# Patient Record
Sex: MALE | Race: BLACK OR AFRICAN AMERICAN | Employment: OTHER | URBAN - METROPOLITAN AREA
[De-identification: names, ages, dates, MRNs, and addresses within clinical notes are randomized per-mention and may not be internally consistent; named-entity substitution may affect disease eponyms.]

---

## 2018-08-14 ENCOUNTER — HOSPITAL ENCOUNTER (OUTPATIENT)
Dept: NUCLEAR MEDICINE | Age: 73
Discharge: HOME | End: 2018-08-14
Attending: FAMILY MEDICINE | Admitting: FAMILY MEDICINE

## 2018-08-14 DIAGNOSIS — E21.3 HYPERPARATHYROIDISM (HCC): ICD-10-CM

## 2018-08-14 RX ADMIN — Medication 20 MILLICURIE: at 11:45

## 2018-08-23 ENCOUNTER — OFFICE VISIT (OUTPATIENT)
Dept: SURGERY | Age: 73
End: 2018-08-23

## 2018-08-23 VITALS
DIASTOLIC BLOOD PRESSURE: 70 MMHG | HEART RATE: 80 BPM | WEIGHT: 200 LBS | HEIGHT: 73 IN | SYSTOLIC BLOOD PRESSURE: 106 MMHG | BODY MASS INDEX: 26.51 KG/M2

## 2018-08-23 DIAGNOSIS — D35.1 PARATHYROID ADENOMA: Primary | ICD-10-CM

## 2018-08-23 PROCEDURE — 1036F TOBACCO NON-USER: CPT | Performed by: SURGERY

## 2018-08-23 PROCEDURE — 4040F PNEUMOC VAC/ADMIN/RCVD: CPT | Performed by: SURGERY

## 2018-08-23 PROCEDURE — G8419 CALC BMI OUT NRM PARAM NOF/U: HCPCS | Performed by: SURGERY

## 2018-08-23 PROCEDURE — 1123F ACP DISCUSS/DSCN MKR DOCD: CPT | Performed by: SURGERY

## 2018-08-23 PROCEDURE — G8427 DOCREV CUR MEDS BY ELIG CLIN: HCPCS | Performed by: SURGERY

## 2018-08-23 PROCEDURE — 99204 OFFICE O/P NEW MOD 45 MIN: CPT | Performed by: SURGERY

## 2018-08-23 PROCEDURE — 3017F COLORECTAL CA SCREEN DOC REV: CPT | Performed by: SURGERY

## 2018-08-23 PROCEDURE — 1101F PT FALLS ASSESS-DOCD LE1/YR: CPT | Performed by: SURGERY

## 2018-08-25 ASSESSMENT — ENCOUNTER SYMPTOMS
BACK PAIN: 0
STRIDOR: 0
PHOTOPHOBIA: 0
SORE THROAT: 0
CHOKING: 0
CONSTIPATION: 0
COLOR CHANGE: 0
ANAL BLEEDING: 0
APNEA: 0
EYE REDNESS: 0
RECTAL PAIN: 0
EYE ITCHING: 0

## 2018-08-25 NOTE — PROGRESS NOTES
HENT:   Head: Normocephalic. Eyes: Pupils are equal, round, and reactive to light. Neck: Normal range of motion. Neck supple. Cardiovascular: Normal rate. Pulmonary/Chest: Effort normal.   Abdominal: Soft. He exhibits no distension and no mass. There is no tenderness. There is no rebound and no guarding. Musculoskeletal: Normal range of motion. Neurological: He is alert and oriented to person, place, and time. Skin: Skin is warm. Psychiatric: He has a normal mood and affect. ASSESSMENT:  1. Parathyroid adenoma          PLAN:  Treatment:  Patient needs a right parathyroidectomy corresponding to the parathyroid scan. This is likely to be parathyroid adenoma. .  Patient counseled on risks, benefits, and alternatives of treatment plan at length today. Patient states an understanding and willingness to proceed with plan. No orders of the defined types were placed in this encounter. No orders of the defined types were placed in this encounter. Follow Up:  No Follow-up on file.       Karen Lozano MD

## 2018-08-27 ENCOUNTER — TELEPHONE (OUTPATIENT)
Dept: SURGERY | Age: 73
End: 2018-08-27

## 2018-08-27 NOTE — ANESTHESIA PRE-OP
Pre-Admission Testing   Pre-Procedural Screening   NP Note    PMH:  Past Medical History:   Diagnosis Date    CAD (coronary artery disease)     Cancer ([facility] [address]     prostate    H/O blood clots 2013    Hyperlipidemia     Hypertension     Prostate cancer ([facility]     psa 4. PSH:    Past Surgical History:   Procedure Laterality Date    PROSTATE BIOPSY      VASECTOMY          Current Medications:   Not in a hospital admission. Allergies: Allergies   Allergen Reactions    Sulfa Antibiotics Other (See Comments)     Coats his tongue             Social History:  Social History     Social History    Marital status:      Spouse name: N/A    Number of children: N/A    Years of education: N/A     Occupational History    Not on file. Social History Main Topics    Smoking status: Never Smoker    Smokeless tobacco: Never Used    Alcohol use 0.6 oz/week     1 Glasses of wine per week    Drug use: No    Sexual activity: No     Other Topics Concern    Not on file     Social History Narrative    No narrative on file        Recent Vitals:  Vitals:    08/29/18 0844   BP: 127/70   Pulse: 56   Resp: 16   Temp: 97.9 °F (36.6 °C)   SpO2: 98%     Wt Readings from Last 3 Encounters:   08/23/18 200 lb (90.7 kg)   10/15/17 209 lb 1.6 oz (94.8 kg)   01/25/17 212 lb (96.2 kg)      Ht Readings from Last 3 Encounters:   08/23/18 6' 1\" (1.854 m)   10/14/17 6' 1\" (1.854 m)   01/25/17 6' 1\" (1.854 m)     There is no height or weight on file to calculate BMI. Wt Readings from Last 3 Encounters:   08/23/18 200 lb (90.7 kg)   10/15/17 209 lb 1.6 oz (94.8 kg)   01/25/17 212 lb (96.2 kg)       Present on Admission:  **None**       Anesthesia Alerts:  NO      Malignant Hyperthermia:  NO     History of Sleep Apnea:   No    REVIEW OF SYSTEMS:      EYES: wears glasses    HEENT: neg     RESPIRATORY:   Non smoker  Able to lie flat. CARDIOVASCULAR: HTN, HLD, CAD, DVT in left arm, 2013. Treated  ~6 month on coumadin. 09:00 AM    CO2 27 08/29/2018 09:00 AM    BUN 19 08/29/2018 09:00 AM    CREATININE 1.0 08/29/2018 09:00 AM    GLUCOSE 104 (H) 08/29/2018 09:00 AM       Summary:  Chart reviewed, pt. Interviewed and examined. Planned surgical procedure:  Right Parathyroidectomy per Dr. Mere Aaron    1. HTN, HLD, CAD, DVT in left arm, 2013. Treated ~6 month on coumadin. NL stress, 2017. Denies CP or SOB. Exercises: elliptical 4-5 days per week. 2.  Non smoker. Able to lie flat. 3.  Reflux , s/p hemorrhoidectomy. 4. Hx BPH, prostate CA, 2013. Hx post-biopsy sepsis, 2014. GS 3+3 prostate cancer and a small renal lesion both on surveillance. Last bx 2017. Followed by Dr. Can Schneider @ [facility]. S/p vasectomy epididymectomy, 1989. On flomax. Anesthesia Evaluation will follow by a certified practitioner prior to surgery.     Electronically signed by DAYNA Leal CNP on 8/27/2018 at 1:00 PM

## 2018-08-29 ENCOUNTER — HOSPITAL ENCOUNTER (OUTPATIENT)
Dept: PREADMISSION TESTING | Age: 73
Discharge: HOME | End: 2018-08-29
Attending: SURGERY | Admitting: SURGERY

## 2018-08-29 VITALS
HEIGHT: 72 IN | WEIGHT: 201.25 LBS | HEART RATE: 56 BPM | OXYGEN SATURATION: 98 % | TEMPERATURE: 97.9 F | DIASTOLIC BLOOD PRESSURE: 70 MMHG | BODY MASS INDEX: 27.26 KG/M2 | SYSTOLIC BLOOD PRESSURE: 127 MMHG | RESPIRATION RATE: 16 BRPM

## 2018-08-29 LAB
ANION GAP SERPL CALCULATED.3IONS-SCNC: 8 MMOL/L (ref 4–16)
BUN BLDV-MCNC: 19 MG/DL (ref 6–23)
CALCIUM SERPL-MCNC: 10.9 MG/DL (ref 8.3–10.6)
CHLORIDE BLD-SCNC: 104 MMOL/L (ref 99–110)
CO2: 27 MMOL/L (ref 21–32)
CREAT SERPL-MCNC: 1 MG/DL (ref 0.9–1.3)
GFR AFRICAN AMERICAN: >60 ML/MIN/1.73M2
GFR NON-AFRICAN AMERICAN: >60 ML/MIN/1.73M2
GLUCOSE BLD-MCNC: 104 MG/DL (ref 70–99)
HCT VFR BLD CALC: 44.3 % (ref 42–52)
HEMOGLOBIN: 13.7 GM/DL (ref 13.5–18)
MCH RBC QN AUTO: 29.8 PG (ref 27–31)
MCHC RBC AUTO-ENTMCNC: 30.9 % (ref 32–36)
MCV RBC AUTO: 96.3 FL (ref 78–100)
PDW BLD-RTO: 12.3 % (ref 11.7–14.9)
PLATELET # BLD: 178 K/CU MM (ref 140–440)
PMV BLD AUTO: 10.6 FL (ref 7.5–11.1)
POTASSIUM SERPL-SCNC: 4.3 MMOL/L (ref 3.5–5.1)
RBC # BLD: 4.6 M/CU MM (ref 4.6–6.2)
SODIUM BLD-SCNC: 139 MMOL/L (ref 135–145)
WBC # BLD: 4 K/CU MM (ref 4–10.5)

## 2018-08-29 RX ORDER — MINERAL OIL
OIL (ML) MISCELLANEOUS DAILY
COMMUNITY
End: 2022-08-17

## 2018-08-31 LAB
EKG ATRIAL RATE: 50 BPM
EKG DIAGNOSIS: NORMAL
EKG P AXIS: 48 DEGREES
EKG P-R INTERVAL: 216 MS
EKG Q-T INTERVAL: 432 MS
EKG QRS DURATION: 88 MS
EKG QTC CALCULATION (BAZETT): 393 MS
EKG R AXIS: -18 DEGREES
EKG T AXIS: -2 DEGREES
EKG VENTRICULAR RATE: 50 BPM

## 2018-09-20 ENCOUNTER — OFFICE VISIT (OUTPATIENT)
Dept: SURGERY | Age: 73
End: 2018-09-20

## 2018-09-20 ENCOUNTER — HOSPITAL ENCOUNTER (OUTPATIENT)
Dept: LAB | Age: 73
Discharge: HOME | End: 2018-09-20
Attending: UROLOGY | Admitting: UROLOGY

## 2018-09-20 VITALS
HEIGHT: 73 IN | WEIGHT: 200 LBS | DIASTOLIC BLOOD PRESSURE: 62 MMHG | HEART RATE: 75 BPM | SYSTOLIC BLOOD PRESSURE: 90 MMHG | BODY MASS INDEX: 26.51 KG/M2

## 2018-09-20 DIAGNOSIS — E78.01 FAMILIAL HYPERCHOLESTEROLEMIA: Primary | ICD-10-CM

## 2018-09-20 DIAGNOSIS — D35.1 PARATHYROID ADENOMA: ICD-10-CM

## 2018-09-20 LAB — PROSTATE SPECIFIC ANTIGEN: 5.46 NG/ML (ref 0–4)

## 2018-09-20 PROCEDURE — 99024 POSTOP FOLLOW-UP VISIT: CPT | Performed by: SURGERY

## 2018-10-22 NOTE — PROGRESS NOTES
Chief Complaint   Patient presents with    Post-Op Check     1st P/O Right Parathyroidectomy, 9/4/18         SUBJECTIVE:  Patient here for post op visit. Pain is minimal.  Wounds: minbruising and no discharge. Past Surgical History:   Procedure Laterality Date    CARDIAC CATHETERIZATION  2015    COLONOSCOPY  Last Done In 2000's    Polyps Removed In Past    DENTAL SURGERY      Teeth Extracted In Past   [address]    PARATHYROIDECTOMY Right 09/04/2018    PROSTATE BIOPSY  2016, 2017    \"Prostate Cancer Dx In 2016\"   [address]     Past Medical History:   Diagnosis Date    Acid reflux     Diverticulitis Last Episode In 2013    Hx of blood clots Dx 2013    \"Left Arm\"    Hyperlipidemia     Hypertension     Parathyroid adenoma     Prostate Cancer Dx 2016    2 Prostate Biopsies Done, Last Done In 2017    Teeth missing     Upper And Lower    Wears glasses     Wears partial dentures     Upper     Family History   Problem Relation Age of Onset    Stroke Mother     Mental Illness Mother     Obesity Mother      Social History     Social History    Marital status:      Spouse name: N/A    Number of children: N/A    Years of education: N/A     Occupational History    Not on file. Social History Main Topics    Smoking status: Never Smoker    Smokeless tobacco: Never Used    Alcohol use Yes      Comment: \"A Couple Times A Week\"    Drug use: No    Sexual activity: Yes     Partners: Female     Other Topics Concern    Not on file     Social History Narrative    No narrative on file       OBJECTIVE:  Physical Exam    Wound well healed without signs of active infection. Suture line intact. Abdomen soft, nontender, nondistended. Path reveals:   Final Pathologic Diagnosis:  Parathyroid, right, resection:  -  Hypercellular parathyroid gland is identified compatible  with clinical impression of parathyroid  adenoma. -  Negative for malignancy.     ASSESSMENT:  Patient doing well on this post operative check. Wounds well healed. 1. Familial hypercholesterolemia    2. Parathyroid adenoma        PLAN:  Continue same  Increase activity as tolerated        No orders of the defined types were placed in this encounter. No orders of the defined types were placed in this encounter. Follow Up: No Follow-up on file.     Ishaan Gonzalez MD

## 2018-10-25 ENCOUNTER — TELEPHONE (OUTPATIENT)
Dept: SURGERY | Age: 73
End: 2018-10-25

## 2018-11-29 ENCOUNTER — OFFICE VISIT (OUTPATIENT)
Dept: SURGERY | Age: 73
End: 2018-11-29

## 2018-11-29 VITALS
HEART RATE: 60 BPM | BODY MASS INDEX: 26.74 KG/M2 | HEIGHT: 73 IN | DIASTOLIC BLOOD PRESSURE: 68 MMHG | SYSTOLIC BLOOD PRESSURE: 128 MMHG | WEIGHT: 201.8 LBS

## 2018-11-29 DIAGNOSIS — E83.52 HYPERCALCEMIA: Primary | ICD-10-CM

## 2018-11-29 PROCEDURE — 99024 POSTOP FOLLOW-UP VISIT: CPT | Performed by: SURGERY

## 2018-12-04 ENCOUNTER — TELEPHONE (OUTPATIENT)
Dept: SURGERY | Age: 73
End: 2018-12-04

## 2018-12-05 ENCOUNTER — HOSPITAL ENCOUNTER (OUTPATIENT)
Age: 73
Discharge: HOME | End: 2018-12-05
Payer: MEDICARE

## 2018-12-05 DIAGNOSIS — E83.52 HYPERCALCEMIA: ICD-10-CM

## 2018-12-05 LAB
CALCIUM IONIZED: 4.68 MG/DL (ref 4.48–5.28)
IONIZED CA: 1.17 MMOL/L (ref 1.12–1.32)

## 2018-12-05 PROCEDURE — 36415 COLL VENOUS BLD VENIPUNCTURE: CPT

## 2018-12-05 PROCEDURE — 82330 ASSAY OF CALCIUM: CPT

## 2018-12-28 ENCOUNTER — HOSPITAL ENCOUNTER (OUTPATIENT)
Age: 73
Discharge: HOME | End: 2018-12-28
Payer: MEDICARE

## 2018-12-28 LAB
ALBUMIN SERPL-MCNC: 4.2 GM/DL (ref 3.4–5)
ALP BLD-CCNC: 55 IU/L (ref 40–129)
ALT SERPL-CCNC: 30 U/L (ref 10–40)
ANION GAP SERPL CALCULATED.3IONS-SCNC: 10 MMOL/L (ref 4–16)
AST SERPL-CCNC: 21 IU/L (ref 15–37)
BILIRUB SERPL-MCNC: 0.8 MG/DL (ref 0–1)
BUN BLDV-MCNC: 19 MG/DL (ref 6–23)
CALCIUM SERPL-MCNC: 9.5 MG/DL (ref 8.3–10.6)
CHLORIDE BLD-SCNC: 101 MMOL/L (ref 99–110)
CO2: 27 MMOL/L (ref 21–32)
CREAT SERPL-MCNC: 1 MG/DL (ref 0.9–1.3)
ERYTHROCYTE SEDIMENTATION RATE: 1 MM/HR (ref 0–20)
GFR AFRICAN AMERICAN: >60 ML/MIN/1.73M2
GFR NON-AFRICAN AMERICAN: >60 ML/MIN/1.73M2
GLUCOSE BLD-MCNC: 85 MG/DL (ref 70–99)
HCT VFR BLD CALC: 46.6 % (ref 42–52)
HEMOGLOBIN: 14.7 GM/DL (ref 13.5–18)
MCH RBC QN AUTO: 30.4 PG (ref 27–31)
MCHC RBC AUTO-ENTMCNC: 31.5 % (ref 32–36)
MCV RBC AUTO: 96.3 FL (ref 78–100)
PDW BLD-RTO: 12.7 % (ref 11.7–14.9)
PLATELET # BLD: 177 K/CU MM (ref 140–440)
PMV BLD AUTO: 11.1 FL (ref 7.5–11.1)
POTASSIUM SERPL-SCNC: 4.4 MMOL/L (ref 3.5–5.1)
RBC # BLD: 4.84 M/CU MM (ref 4.6–6.2)
SODIUM BLD-SCNC: 138 MMOL/L (ref 135–145)
TOTAL PROTEIN: 6.7 GM/DL (ref 6.4–8.2)
WBC # BLD: 4.1 K/CU MM (ref 4–10.5)

## 2018-12-28 PROCEDURE — 84154 ASSAY OF PSA FREE: CPT

## 2018-12-28 PROCEDURE — 85027 COMPLETE CBC AUTOMATED: CPT

## 2018-12-28 PROCEDURE — 86320 SERUM IMMUNOELECTROPHORESIS: CPT

## 2018-12-28 PROCEDURE — 85652 RBC SED RATE AUTOMATED: CPT

## 2018-12-28 PROCEDURE — 80053 COMPREHEN METABOLIC PANEL: CPT

## 2018-12-28 PROCEDURE — 36415 COLL VENOUS BLD VENIPUNCTURE: CPT

## 2018-12-28 PROCEDURE — 83516 IMMUNOASSAY NONANTIBODY: CPT

## 2018-12-28 PROCEDURE — 84153 ASSAY OF PSA TOTAL: CPT

## 2018-12-30 LAB
PROSTATE SPECIFIC ANTIGEN FREE: 1
PROSTATE SPECIFIC ANTIGEN PERCENT FREE: 17
PROSTATE SPECIFIC ANTIGEN: 6

## 2018-12-31 ENCOUNTER — HOSPITAL ENCOUNTER (OUTPATIENT)
Dept: CT IMAGING | Age: 73
Discharge: HOME | End: 2018-12-31
Payer: MEDICARE

## 2018-12-31 DIAGNOSIS — R10.9 STOMACH ACHE: ICD-10-CM

## 2018-12-31 LAB
GLIADIN ANTIBODIES IGA: 4
GLIADIN ANTIBODIES IGG: 5
TRANSGLUTAMINASE IGA: 0

## 2018-12-31 PROCEDURE — 74178 CT ABD&PLV WO CNTR FLWD CNTR: CPT

## 2018-12-31 PROCEDURE — 6360000004 HC RX CONTRAST MEDICATION: Performed by: FAMILY MEDICINE

## 2018-12-31 RX ADMIN — IOHEXOL 50 ML: 240 INJECTION, SOLUTION INTRATHECAL; INTRAVASCULAR; INTRAVENOUS; ORAL at 09:06

## 2018-12-31 RX ADMIN — IOPAMIDOL 75 ML: 755 INJECTION, SOLUTION INTRAVENOUS at 09:07

## 2019-01-28 ENCOUNTER — HOSPITAL ENCOUNTER (OUTPATIENT)
Age: 74
Discharge: HOME | End: 2019-01-28
Payer: MEDICARE

## 2019-01-28 LAB — PROSTATE SPECIFIC ANTIGEN: 6.97 NG/ML (ref 0–4)

## 2019-01-28 PROCEDURE — G0103 PSA SCREENING: HCPCS

## 2019-01-28 PROCEDURE — 36415 COLL VENOUS BLD VENIPUNCTURE: CPT

## 2019-01-28 PROCEDURE — 84153 ASSAY OF PSA TOTAL: CPT

## 2019-03-04 ENCOUNTER — APPOINTMENT (OUTPATIENT)
Dept: GENERAL RADIOLOGY | Age: 74
DRG: 247 | End: 2019-03-04
Payer: MEDICARE

## 2019-03-04 ENCOUNTER — HOSPITAL ENCOUNTER (INPATIENT)
Age: 74
LOS: 1 days | Discharge: HOME | DRG: 247 | End: 2019-03-06
Attending: EMERGENCY MEDICINE | Admitting: INTERNAL MEDICINE
Payer: MEDICARE

## 2019-03-04 DIAGNOSIS — R07.9 CHEST PAIN, UNSPECIFIED TYPE: Primary | ICD-10-CM

## 2019-03-04 PROBLEM — I20.9 ANGINA PECTORIS (HCC): Status: ACTIVE | Noted: 2019-03-04

## 2019-03-04 LAB
ALBUMIN SERPL-MCNC: 4.3 GM/DL (ref 3.4–5)
ALP BLD-CCNC: 54 IU/L (ref 40–129)
ALT SERPL-CCNC: 26 U/L (ref 10–40)
ANION GAP SERPL CALCULATED.3IONS-SCNC: 10 MMOL/L (ref 4–16)
AST SERPL-CCNC: 26 IU/L (ref 15–37)
BASOPHILS ABSOLUTE: 0 K/CU MM
BASOPHILS RELATIVE PERCENT: 0.6 % (ref 0–1)
BILIRUB SERPL-MCNC: 0.9 MG/DL (ref 0–1)
BUN BLDV-MCNC: 15 MG/DL (ref 6–23)
CALCIUM SERPL-MCNC: 9.1 MG/DL (ref 8.3–10.6)
CHLORIDE BLD-SCNC: 100 MMOL/L (ref 99–110)
CO2: 26 MMOL/L (ref 21–32)
CREAT SERPL-MCNC: 1 MG/DL (ref 0.9–1.3)
DIFFERENTIAL TYPE: ABNORMAL
EOSINOPHILS ABSOLUTE: 0.1 K/CU MM
EOSINOPHILS RELATIVE PERCENT: 2.3 % (ref 0–3)
GFR AFRICAN AMERICAN: >60 ML/MIN/1.73M2
GFR NON-AFRICAN AMERICAN: >60 ML/MIN/1.73M2
GLUCOSE BLD-MCNC: 89 MG/DL (ref 70–99)
HCT VFR BLD CALC: 47.4 % (ref 42–52)
HEMOGLOBIN: 14.4 GM/DL (ref 13.5–18)
IMMATURE NEUTROPHIL %: 0 % (ref 0–0.43)
LYMPHOCYTES ABSOLUTE: 2.2 K/CU MM
LYMPHOCYTES RELATIVE PERCENT: 41.1 % (ref 24–44)
MCH RBC QN AUTO: 30.2 PG (ref 27–31)
MCHC RBC AUTO-ENTMCNC: 30.4 % (ref 32–36)
MCV RBC AUTO: 99.4 FL (ref 78–100)
MONOCYTES ABSOLUTE: 0.5 K/CU MM
MONOCYTES RELATIVE PERCENT: 10.1 % (ref 0–4)
NUCLEATED RBC %: 0 %
PDW BLD-RTO: 12.4 % (ref 11.7–14.9)
PLATELET # BLD: 188 K/CU MM (ref 140–440)
PMV BLD AUTO: 10.9 FL (ref 7.5–11.1)
POTASSIUM SERPL-SCNC: 4.2 MMOL/L (ref 3.5–5.1)
RBC # BLD: 4.77 M/CU MM (ref 4.6–6.2)
SEGMENTED NEUTROPHILS ABSOLUTE COUNT: 2.4 K/CU MM
SEGMENTED NEUTROPHILS RELATIVE PERCENT: 45.9 % (ref 36–66)
SODIUM BLD-SCNC: 136 MMOL/L (ref 135–145)
TOTAL IMMATURE NEUTOROPHIL: 0 K/CU MM
TOTAL NUCLEATED RBC: 0 K/CU MM
TOTAL PROTEIN: 7.2 GM/DL (ref 6.4–8.2)
TROPONIN T: <0.01 NG/ML
WBC # BLD: 5.2 K/CU MM (ref 4–10.5)

## 2019-03-04 PROCEDURE — G0378 HOSPITAL OBSERVATION PER HR: HCPCS

## 2019-03-04 PROCEDURE — 84484 ASSAY OF TROPONIN QUANT: CPT

## 2019-03-04 PROCEDURE — 36415 COLL VENOUS BLD VENIPUNCTURE: CPT

## 2019-03-04 PROCEDURE — 93005 ELECTROCARDIOGRAM TRACING: CPT | Performed by: EMERGENCY MEDICINE

## 2019-03-04 PROCEDURE — 99285 EMERGENCY DEPT VISIT HI MDM: CPT

## 2019-03-04 PROCEDURE — 71046 X-RAY EXAM CHEST 2 VIEWS: CPT

## 2019-03-04 PROCEDURE — 80053 COMPREHEN METABOLIC PANEL: CPT

## 2019-03-04 PROCEDURE — 85025 COMPLETE CBC W/AUTO DIFF WBC: CPT

## 2019-03-04 PROCEDURE — 6370000000 HC RX 637 (ALT 250 FOR IP): Performed by: EMERGENCY MEDICINE

## 2019-03-04 RX ORDER — ATORVASTATIN CALCIUM 40 MG/1
80 TABLET, FILM COATED ORAL NIGHTLY
Status: DISCONTINUED | OUTPATIENT
Start: 2019-03-04 | End: 2019-03-05

## 2019-03-04 RX ORDER — CARVEDILOL 6.25 MG/1
6.25 TABLET ORAL 2 TIMES DAILY WITH MEALS
Status: DISCONTINUED | OUTPATIENT
Start: 2019-03-04 | End: 2019-03-05

## 2019-03-04 RX ORDER — ASPIRIN 81 MG/1
324 TABLET, CHEWABLE ORAL ONCE
Status: COMPLETED | OUTPATIENT
Start: 2019-03-04 | End: 2019-03-04

## 2019-03-04 RX ADMIN — ASPIRIN 81 MG 324 MG: 81 TABLET ORAL at 21:26

## 2019-03-04 ASSESSMENT — PAIN DESCRIPTION - ORIENTATION: ORIENTATION: LEFT

## 2019-03-04 ASSESSMENT — PAIN DESCRIPTION - LOCATION: LOCATION: CHEST

## 2019-03-04 ASSESSMENT — HEART SCORE: ECG: 0

## 2019-03-04 ASSESSMENT — PAIN SCALES - GENERAL: PAINLEVEL_OUTOF10: 2

## 2019-03-05 PROBLEM — I25.10 CAD IN NATIVE ARTERY: Status: ACTIVE | Noted: 2019-03-05

## 2019-03-05 LAB
ACTIVATED CLOTTING TIME, LOW RANGE: 346 SEC
EKG ATRIAL RATE: 54 BPM
EKG DIAGNOSIS: NORMAL
EKG P AXIS: 54 DEGREES
EKG P-R INTERVAL: 194 MS
EKG Q-T INTERVAL: 448 MS
EKG QRS DURATION: 90 MS
EKG QTC CALCULATION (BAZETT): 424 MS
EKG R AXIS: -8 DEGREES
EKG T AXIS: 38 DEGREES
EKG VENTRICULAR RATE: 54 BPM
LV EF: 58 %
LVEF MODALITY: NORMAL
TROPONIN T: <0.01 NG/ML

## 2019-03-05 PROCEDURE — C9600 PERC DRUG-EL COR STENT SING: HCPCS

## 2019-03-05 PROCEDURE — 93458 L HRT ARTERY/VENTRICLE ANGIO: CPT

## 2019-03-05 PROCEDURE — B2111ZZ FLUOROSCOPY OF MULTIPLE CORONARY ARTERIES USING LOW OSMOLAR CONTRAST: ICD-10-PCS | Performed by: INTERNAL MEDICINE

## 2019-03-05 PROCEDURE — 6370000000 HC RX 637 (ALT 250 FOR IP): Performed by: INTERNAL MEDICINE

## 2019-03-05 PROCEDURE — 4A023N7 MEASUREMENT OF CARDIAC SAMPLING AND PRESSURE, LEFT HEART, PERCUTANEOUS APPROACH: ICD-10-PCS | Performed by: INTERNAL MEDICINE

## 2019-03-05 PROCEDURE — 94761 N-INVAS EAR/PLS OXIMETRY MLT: CPT

## 2019-03-05 PROCEDURE — 2709999900 HC NON-CHARGEABLE SUPPLY

## 2019-03-05 PROCEDURE — 6360000004 HC RX CONTRAST MEDICATION

## 2019-03-05 PROCEDURE — 6360000002 HC RX W HCPCS

## 2019-03-05 PROCEDURE — 2580000003 HC RX 258: Performed by: INTERNAL MEDICINE

## 2019-03-05 PROCEDURE — B2151ZZ FLUOROSCOPY OF LEFT HEART USING LOW OSMOLAR CONTRAST: ICD-10-PCS | Performed by: INTERNAL MEDICINE

## 2019-03-05 PROCEDURE — 36415 COLL VENOUS BLD VENIPUNCTURE: CPT

## 2019-03-05 PROCEDURE — C1769 GUIDE WIRE: HCPCS

## 2019-03-05 PROCEDURE — C1874 STENT, COATED/COV W/DEL SYS: HCPCS

## 2019-03-05 PROCEDURE — 85347 COAGULATION TIME ACTIVATED: CPT

## 2019-03-05 PROCEDURE — 2140000000 HC CCU INTERMEDIATE R&B

## 2019-03-05 PROCEDURE — 84484 ASSAY OF TROPONIN QUANT: CPT

## 2019-03-05 PROCEDURE — 6370000000 HC RX 637 (ALT 250 FOR IP)

## 2019-03-05 PROCEDURE — C1887 CATHETER, GUIDING: HCPCS

## 2019-03-05 PROCEDURE — 027034Z DILATION OF CORONARY ARTERY, ONE ARTERY WITH DRUG-ELUTING INTRALUMINAL DEVICE, PERCUTANEOUS APPROACH: ICD-10-PCS | Performed by: INTERNAL MEDICINE

## 2019-03-05 PROCEDURE — C1894 INTRO/SHEATH, NON-LASER: HCPCS

## 2019-03-05 PROCEDURE — 93306 TTE W/DOPPLER COMPLETE: CPT

## 2019-03-05 RX ORDER — SODIUM CHLORIDE 9 MG/ML
INJECTION, SOLUTION INTRAVENOUS CONTINUOUS
Status: DISCONTINUED | OUTPATIENT
Start: 2019-03-05 | End: 2019-03-06

## 2019-03-05 RX ORDER — CLOPIDOGREL BISULFATE 75 MG/1
75 TABLET ORAL DAILY
Status: DISCONTINUED | OUTPATIENT
Start: 2019-03-06 | End: 2019-03-06 | Stop reason: HOSPADM

## 2019-03-05 RX ORDER — TAMSULOSIN HYDROCHLORIDE 0.4 MG/1
0.4 CAPSULE ORAL NIGHTLY
Status: DISCONTINUED | OUTPATIENT
Start: 2019-03-05 | End: 2019-03-06 | Stop reason: HOSPADM

## 2019-03-05 RX ORDER — ENALAPRILAT 2.5 MG/2ML
1.25 INJECTION INTRAVENOUS EVERY 6 HOURS PRN
Status: DISCONTINUED | OUTPATIENT
Start: 2019-03-05 | End: 2019-03-05

## 2019-03-05 RX ORDER — SODIUM CHLORIDE 0.9 % (FLUSH) 0.9 %
10 SYRINGE (ML) INJECTION EVERY 12 HOURS SCHEDULED
Status: DISCONTINUED | OUTPATIENT
Start: 2019-03-05 | End: 2019-03-06 | Stop reason: HOSPADM

## 2019-03-05 RX ORDER — ASPIRIN 81 MG/1
81 TABLET, CHEWABLE ORAL DAILY
Status: DISCONTINUED | OUTPATIENT
Start: 2019-03-06 | End: 2019-03-06 | Stop reason: HOSPADM

## 2019-03-05 RX ORDER — CARVEDILOL 12.5 MG/1
12.5 TABLET ORAL 2 TIMES DAILY WITH MEALS
Status: DISCONTINUED | OUTPATIENT
Start: 2019-03-05 | End: 2019-03-06 | Stop reason: HOSPADM

## 2019-03-05 RX ORDER — MORPHINE SULFATE 4 MG/ML
1 INJECTION, SOLUTION INTRAMUSCULAR; INTRAVENOUS
Status: ACTIVE | OUTPATIENT
Start: 2019-03-05 | End: 2019-03-05

## 2019-03-05 RX ORDER — ACETAMINOPHEN 325 MG/1
650 TABLET ORAL EVERY 4 HOURS PRN
Status: DISCONTINUED | OUTPATIENT
Start: 2019-03-05 | End: 2019-03-06 | Stop reason: HOSPADM

## 2019-03-05 RX ORDER — ATORVASTATIN CALCIUM 40 MG/1
80 TABLET, FILM COATED ORAL NIGHTLY
Status: DISCONTINUED | OUTPATIENT
Start: 2019-03-05 | End: 2019-03-06 | Stop reason: HOSPADM

## 2019-03-05 RX ORDER — SODIUM CHLORIDE 0.9 % (FLUSH) 0.9 %
10 SYRINGE (ML) INJECTION PRN
Status: DISCONTINUED | OUTPATIENT
Start: 2019-03-05 | End: 2019-03-06 | Stop reason: HOSPADM

## 2019-03-05 RX ORDER — ATROPINE SULFATE 0.4 MG/ML
0.5 AMPUL (ML) INJECTION
Status: ACTIVE | OUTPATIENT
Start: 2019-03-05 | End: 2019-03-05

## 2019-03-05 RX ORDER — ASPIRIN 325 MG
325 TABLET ORAL DAILY
Status: ON HOLD | COMMUNITY
End: 2019-03-06 | Stop reason: HOSPADM

## 2019-03-05 RX ORDER — NITROGLYCERIN 0.4 MG/1
0.4 TABLET SUBLINGUAL EVERY 5 MIN PRN
Status: DISCONTINUED | OUTPATIENT
Start: 2019-03-05 | End: 2019-03-06 | Stop reason: HOSPADM

## 2019-03-05 RX ADMIN — CARVEDILOL 6.25 MG: 12.5 TABLET, FILM COATED ORAL at 08:39

## 2019-03-05 RX ADMIN — SODIUM CHLORIDE: 9 INJECTION, SOLUTION INTRAVENOUS at 01:46

## 2019-03-05 RX ADMIN — SODIUM CHLORIDE, PRESERVATIVE FREE 10 ML: 5 INJECTION INTRAVENOUS at 22:22

## 2019-03-05 RX ADMIN — CARVEDILOL 12.5 MG: 12.5 TABLET, FILM COATED ORAL at 01:46

## 2019-03-05 RX ADMIN — TAMSULOSIN HYDROCHLORIDE 0.4 MG: 0.4 CAPSULE ORAL at 01:46

## 2019-03-05 RX ADMIN — ATORVASTATIN CALCIUM 80 MG: 40 TABLET, FILM COATED ORAL at 01:46

## 2019-03-05 RX ADMIN — SODIUM CHLORIDE: 9 INJECTION, SOLUTION INTRAVENOUS at 12:52

## 2019-03-05 RX ADMIN — ATORVASTATIN CALCIUM 80 MG: 40 TABLET, FILM COATED ORAL at 22:22

## 2019-03-05 RX ADMIN — TAMSULOSIN HYDROCHLORIDE 0.4 MG: 0.4 CAPSULE ORAL at 22:22

## 2019-03-05 ASSESSMENT — PAIN SCALES - GENERAL
PAINLEVEL_OUTOF10: 0

## 2019-03-06 VITALS
TEMPERATURE: 97.8 F | WEIGHT: 195.77 LBS | BODY MASS INDEX: 25.95 KG/M2 | RESPIRATION RATE: 20 BRPM | HEIGHT: 73 IN | SYSTOLIC BLOOD PRESSURE: 144 MMHG | OXYGEN SATURATION: 100 % | HEART RATE: 66 BPM | DIASTOLIC BLOOD PRESSURE: 85 MMHG

## 2019-03-06 LAB
ANION GAP SERPL CALCULATED.3IONS-SCNC: 8 MMOL/L (ref 4–16)
BUN BLDV-MCNC: 13 MG/DL (ref 6–23)
CALCIUM SERPL-MCNC: 8.1 MG/DL (ref 8.3–10.6)
CHLORIDE BLD-SCNC: 105 MMOL/L (ref 99–110)
CO2: 24 MMOL/L (ref 21–32)
CREAT SERPL-MCNC: 1.1 MG/DL (ref 0.9–1.3)
EKG ATRIAL RATE: 117 BPM
EKG ATRIAL RATE: 52 BPM
EKG DIAGNOSIS: NORMAL
EKG DIAGNOSIS: NORMAL
EKG P AXIS: 73 DEGREES
EKG P-R INTERVAL: 208 MS
EKG Q-T INTERVAL: 432 MS
EKG Q-T INTERVAL: 462 MS
EKG QRS DURATION: 100 MS
EKG QRS DURATION: 92 MS
EKG QTC CALCULATION (BAZETT): 429 MS
EKG QTC CALCULATION (BAZETT): 482 MS
EKG R AXIS: -13 DEGREES
EKG R AXIS: -7 DEGREES
EKG T AXIS: 107 DEGREES
EKG T AXIS: 26 DEGREES
EKG VENTRICULAR RATE: 52 BPM
EKG VENTRICULAR RATE: 75 BPM
GFR AFRICAN AMERICAN: >60 ML/MIN/1.73M2
GFR NON-AFRICAN AMERICAN: >60 ML/MIN/1.73M2
GLUCOSE BLD-MCNC: 102 MG/DL (ref 70–99)
HCT VFR BLD CALC: 42.3 % (ref 42–52)
HEMOGLOBIN: 13 GM/DL (ref 13.5–18)
MCH RBC QN AUTO: 30.3 PG (ref 27–31)
MCHC RBC AUTO-ENTMCNC: 30.7 % (ref 32–36)
MCV RBC AUTO: 98.6 FL (ref 78–100)
PDW BLD-RTO: 12.6 % (ref 11.7–14.9)
PLATELET # BLD: 173 K/CU MM (ref 140–440)
PMV BLD AUTO: 10.5 FL (ref 7.5–11.1)
POTASSIUM SERPL-SCNC: 4.1 MMOL/L (ref 3.5–5.1)
RBC # BLD: 4.29 M/CU MM (ref 4.6–6.2)
SODIUM BLD-SCNC: 137 MMOL/L (ref 135–145)
WBC # BLD: 5.1 K/CU MM (ref 4–10.5)

## 2019-03-06 PROCEDURE — 6370000000 HC RX 637 (ALT 250 FOR IP): Performed by: INTERNAL MEDICINE

## 2019-03-06 PROCEDURE — 93005 ELECTROCARDIOGRAM TRACING: CPT | Performed by: INTERNAL MEDICINE

## 2019-03-06 PROCEDURE — 80048 BASIC METABOLIC PNL TOTAL CA: CPT

## 2019-03-06 PROCEDURE — 2580000003 HC RX 258: Performed by: INTERNAL MEDICINE

## 2019-03-06 PROCEDURE — 36415 COLL VENOUS BLD VENIPUNCTURE: CPT

## 2019-03-06 PROCEDURE — 85027 COMPLETE CBC AUTOMATED: CPT

## 2019-03-06 RX ORDER — ATORVASTATIN CALCIUM 80 MG/1
80 TABLET, FILM COATED ORAL NIGHTLY
Qty: 90 TABLET | Refills: 0 | Status: SHIPPED | OUTPATIENT
Start: 2019-03-06 | End: 2022-08-17

## 2019-03-06 RX ORDER — ASPIRIN 81 MG/1
81 TABLET, CHEWABLE ORAL DAILY
Qty: 90 TABLET | Refills: 0 | Status: SHIPPED | OUTPATIENT
Start: 2019-03-07

## 2019-03-06 RX ORDER — NITROGLYCERIN 0.4 MG/1
TABLET SUBLINGUAL
Qty: 30 TABLET | Refills: 0 | Status: SHIPPED | OUTPATIENT
Start: 2019-03-06 | End: 2019-03-06

## 2019-03-06 RX ORDER — CLOPIDOGREL BISULFATE 75 MG/1
75 TABLET ORAL DAILY
Qty: 90 TABLET | Refills: 0 | Status: SHIPPED | OUTPATIENT
Start: 2019-03-07 | End: 2022-08-20

## 2019-03-06 RX ORDER — NITROGLYCERIN 0.4 MG/1
TABLET SUBLINGUAL
Qty: 30 TABLET | Refills: 0 | Status: SHIPPED | OUTPATIENT
Start: 2019-03-06 | End: 2022-08-17

## 2019-03-06 RX ORDER — CARVEDILOL 12.5 MG/1
12.5 TABLET ORAL 2 TIMES DAILY WITH MEALS
Qty: 180 TABLET | Refills: 0 | Status: ON HOLD | OUTPATIENT
Start: 2019-03-06 | End: 2021-05-07 | Stop reason: HOSPADM

## 2019-03-06 RX ADMIN — CARVEDILOL 12.5 MG: 12.5 TABLET, FILM COATED ORAL at 08:28

## 2019-03-06 RX ADMIN — CLOPIDOGREL BISULFATE 75 MG: 75 TABLET ORAL at 08:28

## 2019-03-06 RX ADMIN — SODIUM CHLORIDE: 9 INJECTION, SOLUTION INTRAVENOUS at 06:34

## 2019-03-06 RX ADMIN — ASPIRIN 81 MG 81 MG: 81 TABLET ORAL at 08:28

## 2019-03-06 ASSESSMENT — PAIN SCALES - GENERAL
PAINLEVEL_OUTOF10: 0

## 2019-05-06 ENCOUNTER — HOSPITAL ENCOUNTER (OUTPATIENT)
Dept: CARDIAC REHAB | Age: 74
Setting detail: THERAPIES SERIES
Discharge: HOME | End: 2019-05-06
Payer: MEDICARE

## 2019-05-06 PROCEDURE — 93798 PHYS/QHP OP CAR RHAB W/ECG: CPT

## 2019-05-07 ENCOUNTER — APPOINTMENT (OUTPATIENT)
Dept: CARDIAC REHAB | Age: 74
End: 2019-05-07
Payer: MEDICARE

## 2019-05-09 ENCOUNTER — HOSPITAL ENCOUNTER (OUTPATIENT)
Dept: CARDIAC REHAB | Age: 74
Setting detail: THERAPIES SERIES
Discharge: HOME | End: 2019-05-09
Payer: MEDICARE

## 2019-05-09 PROCEDURE — 93798 PHYS/QHP OP CAR RHAB W/ECG: CPT

## 2019-05-13 ENCOUNTER — HOSPITAL ENCOUNTER (OUTPATIENT)
Dept: CARDIAC REHAB | Age: 74
Setting detail: THERAPIES SERIES
Discharge: HOME | End: 2019-05-13
Payer: MEDICARE

## 2019-05-13 PROCEDURE — 93798 PHYS/QHP OP CAR RHAB W/ECG: CPT

## 2019-05-14 ENCOUNTER — APPOINTMENT (OUTPATIENT)
Dept: CARDIAC REHAB | Age: 74
End: 2019-05-14
Payer: MEDICARE

## 2019-05-21 ENCOUNTER — APPOINTMENT (OUTPATIENT)
Dept: CARDIAC REHAB | Age: 74
End: 2019-05-21
Payer: MEDICARE

## 2019-05-28 ENCOUNTER — HOSPITAL ENCOUNTER (OUTPATIENT)
Dept: CARDIAC REHAB | Age: 74
Setting detail: THERAPIES SERIES
Discharge: HOME | End: 2019-05-28
Payer: MEDICARE

## 2019-05-28 PROCEDURE — 93798 PHYS/QHP OP CAR RHAB W/ECG: CPT

## 2019-05-30 ENCOUNTER — HOSPITAL ENCOUNTER (OUTPATIENT)
Dept: CARDIAC REHAB | Age: 74
Setting detail: THERAPIES SERIES
Discharge: HOME | End: 2019-05-30
Payer: MEDICARE

## 2019-05-30 PROCEDURE — 93798 PHYS/QHP OP CAR RHAB W/ECG: CPT

## 2019-06-03 ENCOUNTER — HOSPITAL ENCOUNTER (OUTPATIENT)
Dept: CARDIAC REHAB | Age: 74
Setting detail: THERAPIES SERIES
Discharge: HOME | End: 2019-06-03
Payer: MEDICARE

## 2019-06-03 PROCEDURE — 93798 PHYS/QHP OP CAR RHAB W/ECG: CPT

## 2019-06-04 ENCOUNTER — HOSPITAL ENCOUNTER (OUTPATIENT)
Dept: CARDIAC REHAB | Age: 74
Setting detail: THERAPIES SERIES
Discharge: HOME | End: 2019-06-04
Payer: MEDICARE

## 2019-06-04 PROCEDURE — 93798 PHYS/QHP OP CAR RHAB W/ECG: CPT

## 2019-06-06 ENCOUNTER — HOSPITAL ENCOUNTER (OUTPATIENT)
Dept: CARDIAC REHAB | Age: 74
Setting detail: THERAPIES SERIES
Discharge: HOME | End: 2019-06-06
Payer: MEDICARE

## 2019-06-06 PROCEDURE — 93798 PHYS/QHP OP CAR RHAB W/ECG: CPT

## 2019-06-10 ENCOUNTER — HOSPITAL ENCOUNTER (OUTPATIENT)
Dept: CARDIAC REHAB | Age: 74
Setting detail: THERAPIES SERIES
Discharge: HOME | End: 2019-06-10
Payer: MEDICARE

## 2019-06-10 PROCEDURE — 93798 PHYS/QHP OP CAR RHAB W/ECG: CPT

## 2019-06-11 ENCOUNTER — HOSPITAL ENCOUNTER (OUTPATIENT)
Dept: CARDIAC REHAB | Age: 74
Setting detail: THERAPIES SERIES
Discharge: HOME | End: 2019-06-11
Payer: MEDICARE

## 2019-06-11 PROCEDURE — 93798 PHYS/QHP OP CAR RHAB W/ECG: CPT

## 2019-06-13 ENCOUNTER — HOSPITAL ENCOUNTER (OUTPATIENT)
Dept: CARDIAC REHAB | Age: 74
Setting detail: THERAPIES SERIES
Discharge: HOME | End: 2019-06-13
Payer: MEDICARE

## 2019-06-13 PROCEDURE — 93798 PHYS/QHP OP CAR RHAB W/ECG: CPT

## 2019-06-17 ENCOUNTER — HOSPITAL ENCOUNTER (OUTPATIENT)
Dept: CARDIAC REHAB | Age: 74
Setting detail: THERAPIES SERIES
Discharge: HOME | End: 2019-06-17
Payer: MEDICARE

## 2019-06-17 PROCEDURE — 93798 PHYS/QHP OP CAR RHAB W/ECG: CPT

## 2019-06-18 ENCOUNTER — HOSPITAL ENCOUNTER (OUTPATIENT)
Dept: CARDIAC REHAB | Age: 74
Setting detail: THERAPIES SERIES
Discharge: HOME | End: 2019-06-18
Payer: MEDICARE

## 2019-06-18 PROCEDURE — 93798 PHYS/QHP OP CAR RHAB W/ECG: CPT

## 2019-06-20 ENCOUNTER — HOSPITAL ENCOUNTER (OUTPATIENT)
Dept: CARDIAC REHAB | Age: 74
Setting detail: THERAPIES SERIES
Discharge: HOME | End: 2019-06-20
Payer: MEDICARE

## 2019-06-20 PROCEDURE — 93798 PHYS/QHP OP CAR RHAB W/ECG: CPT

## 2019-06-24 ENCOUNTER — HOSPITAL ENCOUNTER (OUTPATIENT)
Dept: CARDIAC REHAB | Age: 74
Setting detail: THERAPIES SERIES
Discharge: HOME | End: 2019-06-24
Payer: MEDICARE

## 2019-06-24 PROCEDURE — 93798 PHYS/QHP OP CAR RHAB W/ECG: CPT

## 2019-06-25 ENCOUNTER — HOSPITAL ENCOUNTER (OUTPATIENT)
Dept: CARDIAC REHAB | Age: 74
Setting detail: THERAPIES SERIES
Discharge: HOME | End: 2019-06-25
Payer: MEDICARE

## 2019-06-25 PROCEDURE — 93798 PHYS/QHP OP CAR RHAB W/ECG: CPT

## 2019-06-27 ENCOUNTER — HOSPITAL ENCOUNTER (OUTPATIENT)
Dept: CARDIAC REHAB | Age: 74
Setting detail: THERAPIES SERIES
Discharge: HOME | End: 2019-06-27
Payer: MEDICARE

## 2019-06-27 PROCEDURE — 93798 PHYS/QHP OP CAR RHAB W/ECG: CPT

## 2019-07-01 ENCOUNTER — HOSPITAL ENCOUNTER (OUTPATIENT)
Dept: CARDIAC REHAB | Age: 74
Setting detail: THERAPIES SERIES
Discharge: HOME | End: 2019-07-01
Payer: MEDICARE

## 2019-07-01 PROCEDURE — 93798 PHYS/QHP OP CAR RHAB W/ECG: CPT

## 2019-07-02 ENCOUNTER — HOSPITAL ENCOUNTER (OUTPATIENT)
Dept: CARDIAC REHAB | Age: 74
Setting detail: THERAPIES SERIES
Discharge: HOME | End: 2019-07-02
Payer: MEDICARE

## 2019-07-02 PROCEDURE — 93798 PHYS/QHP OP CAR RHAB W/ECG: CPT

## 2019-07-08 ENCOUNTER — HOSPITAL ENCOUNTER (OUTPATIENT)
Dept: CARDIAC REHAB | Age: 74
Setting detail: THERAPIES SERIES
Discharge: HOME | End: 2019-07-08
Payer: MEDICARE

## 2019-07-08 PROCEDURE — 93798 PHYS/QHP OP CAR RHAB W/ECG: CPT

## 2019-07-09 ENCOUNTER — HOSPITAL ENCOUNTER (OUTPATIENT)
Dept: CARDIAC REHAB | Age: 74
Setting detail: THERAPIES SERIES
Discharge: HOME | End: 2019-07-09
Payer: MEDICARE

## 2019-07-09 PROCEDURE — 93798 PHYS/QHP OP CAR RHAB W/ECG: CPT

## 2019-07-11 ENCOUNTER — HOSPITAL ENCOUNTER (OUTPATIENT)
Dept: CARDIAC REHAB | Age: 74
Setting detail: THERAPIES SERIES
Discharge: HOME | End: 2019-07-11
Payer: MEDICARE

## 2019-07-11 PROCEDURE — 93798 PHYS/QHP OP CAR RHAB W/ECG: CPT

## 2019-07-15 ENCOUNTER — HOSPITAL ENCOUNTER (OUTPATIENT)
Dept: CARDIAC REHAB | Age: 74
Setting detail: THERAPIES SERIES
Discharge: HOME | End: 2019-07-15
Payer: MEDICARE

## 2019-07-15 PROCEDURE — 93798 PHYS/QHP OP CAR RHAB W/ECG: CPT

## 2019-07-16 ENCOUNTER — HOSPITAL ENCOUNTER (OUTPATIENT)
Dept: CARDIAC REHAB | Age: 74
Setting detail: THERAPIES SERIES
Discharge: HOME | End: 2019-07-16
Payer: MEDICARE

## 2019-07-16 PROCEDURE — 93798 PHYS/QHP OP CAR RHAB W/ECG: CPT

## 2019-07-25 ENCOUNTER — HOSPITAL ENCOUNTER (OUTPATIENT)
Dept: CARDIAC REHAB | Age: 74
Setting detail: THERAPIES SERIES
Discharge: HOME | End: 2019-07-25
Payer: MEDICARE

## 2019-07-25 PROCEDURE — 93798 PHYS/QHP OP CAR RHAB W/ECG: CPT

## 2019-07-30 ENCOUNTER — HOSPITAL ENCOUNTER (OUTPATIENT)
Dept: CARDIAC REHAB | Age: 74
Setting detail: THERAPIES SERIES
Discharge: HOME | End: 2019-07-30
Payer: MEDICARE

## 2019-07-30 PROCEDURE — 93798 PHYS/QHP OP CAR RHAB W/ECG: CPT

## 2019-08-01 ENCOUNTER — HOSPITAL ENCOUNTER (OUTPATIENT)
Dept: CARDIAC REHAB | Age: 74
Setting detail: THERAPIES SERIES
Discharge: HOME | End: 2019-08-01
Payer: MEDICARE

## 2019-08-01 PROCEDURE — 93798 PHYS/QHP OP CAR RHAB W/ECG: CPT

## 2019-08-06 ENCOUNTER — HOSPITAL ENCOUNTER (OUTPATIENT)
Dept: CARDIAC REHAB | Age: 74
Setting detail: THERAPIES SERIES
Discharge: HOME | End: 2019-08-06
Payer: MEDICARE

## 2019-08-06 PROCEDURE — 93798 PHYS/QHP OP CAR RHAB W/ECG: CPT

## 2019-08-08 ENCOUNTER — HOSPITAL ENCOUNTER (OUTPATIENT)
Dept: CARDIAC REHAB | Age: 74
Setting detail: THERAPIES SERIES
Discharge: HOME | End: 2019-08-08
Payer: MEDICARE

## 2019-08-08 ENCOUNTER — HOSPITAL ENCOUNTER (OUTPATIENT)
Age: 74
Setting detail: OBSERVATION
Discharge: HOME | End: 2019-08-09
Attending: EMERGENCY MEDICINE | Admitting: INTERNAL MEDICINE
Payer: MEDICARE

## 2019-08-08 ENCOUNTER — APPOINTMENT (OUTPATIENT)
Dept: GENERAL RADIOLOGY | Age: 74
End: 2019-08-08
Payer: MEDICARE

## 2019-08-08 DIAGNOSIS — R07.9 CHEST PAIN, UNSPECIFIED TYPE: Primary | ICD-10-CM

## 2019-08-08 LAB
ALBUMIN SERPL-MCNC: 3.6 GM/DL (ref 3.4–5)
ALP BLD-CCNC: 50 IU/L (ref 40–129)
ALT SERPL-CCNC: 28 U/L (ref 10–40)
ANION GAP SERPL CALCULATED.3IONS-SCNC: 6 MMOL/L (ref 4–16)
AST SERPL-CCNC: 21 IU/L (ref 15–37)
BASOPHILS ABSOLUTE: 0 K/CU MM
BASOPHILS RELATIVE PERCENT: 0.4 % (ref 0–1)
BILIRUB SERPL-MCNC: 0.6 MG/DL (ref 0–1)
BUN BLDV-MCNC: 17 MG/DL (ref 6–23)
CALCIUM SERPL-MCNC: 9.3 MG/DL (ref 8.3–10.6)
CHLORIDE BLD-SCNC: 103 MMOL/L (ref 99–110)
CO2: 26 MMOL/L (ref 21–32)
CREAT SERPL-MCNC: 1.1 MG/DL (ref 0.9–1.3)
DIFFERENTIAL TYPE: ABNORMAL
EOSINOPHILS ABSOLUTE: 0.2 K/CU MM
EOSINOPHILS RELATIVE PERCENT: 3.6 % (ref 0–3)
GFR AFRICAN AMERICAN: >60 ML/MIN/1.73M2
GFR NON-AFRICAN AMERICAN: >60 ML/MIN/1.73M2
GLUCOSE BLD-MCNC: 90 MG/DL (ref 70–99)
HCT VFR BLD CALC: 42 % (ref 42–52)
HEMOGLOBIN: 13.2 GM/DL (ref 13.5–18)
IMMATURE NEUTROPHIL %: 0 % (ref 0–0.43)
LYMPHOCYTES ABSOLUTE: 2.1 K/CU MM
LYMPHOCYTES RELATIVE PERCENT: 40.1 % (ref 24–44)
MCH RBC QN AUTO: 30.3 PG (ref 27–31)
MCHC RBC AUTO-ENTMCNC: 31.4 % (ref 32–36)
MCV RBC AUTO: 96.6 FL (ref 78–100)
MONOCYTES ABSOLUTE: 0.5 K/CU MM
MONOCYTES RELATIVE PERCENT: 9.5 % (ref 0–4)
NUCLEATED RBC %: 0 %
PDW BLD-RTO: 12.3 % (ref 11.7–14.9)
PLATELET # BLD: 163 K/CU MM (ref 140–440)
PMV BLD AUTO: 10.6 FL (ref 7.5–11.1)
POTASSIUM SERPL-SCNC: 4.3 MMOL/L (ref 3.5–5.1)
RBC # BLD: 4.35 M/CU MM (ref 4.6–6.2)
SEGMENTED NEUTROPHILS ABSOLUTE COUNT: 2.4 K/CU MM
SEGMENTED NEUTROPHILS RELATIVE PERCENT: 46.4 % (ref 36–66)
SODIUM BLD-SCNC: 135 MMOL/L (ref 135–145)
TOTAL IMMATURE NEUTOROPHIL: 0 K/CU MM
TOTAL NUCLEATED RBC: 0 K/CU MM
TOTAL PROTEIN: 6.4 GM/DL (ref 6.4–8.2)
TROPONIN T: <0.01 NG/ML
WBC # BLD: 5.3 K/CU MM (ref 4–10.5)

## 2019-08-08 PROCEDURE — 85025 COMPLETE CBC W/AUTO DIFF WBC: CPT

## 2019-08-08 PROCEDURE — 71045 X-RAY EXAM CHEST 1 VIEW: CPT

## 2019-08-08 PROCEDURE — G0378 HOSPITAL OBSERVATION PER HR: HCPCS

## 2019-08-08 PROCEDURE — 93005 ELECTROCARDIOGRAM TRACING: CPT | Performed by: EMERGENCY MEDICINE

## 2019-08-08 PROCEDURE — 36415 COLL VENOUS BLD VENIPUNCTURE: CPT

## 2019-08-08 PROCEDURE — 80053 COMPREHEN METABOLIC PANEL: CPT

## 2019-08-08 PROCEDURE — 99285 EMERGENCY DEPT VISIT HI MDM: CPT

## 2019-08-08 PROCEDURE — 84484 ASSAY OF TROPONIN QUANT: CPT

## 2019-08-08 PROCEDURE — 93798 PHYS/QHP OP CAR RHAB W/ECG: CPT

## 2019-08-08 RX ORDER — LISINOPRIL 2.5 MG/1
2.5 TABLET ORAL DAILY
Status: ON HOLD | COMMUNITY
End: 2019-08-09 | Stop reason: HOSPADM

## 2019-08-08 RX ORDER — CARVEDILOL 12.5 MG/1
12.5 TABLET ORAL 2 TIMES DAILY WITH MEALS
Status: DISCONTINUED | OUTPATIENT
Start: 2019-08-09 | End: 2019-08-09 | Stop reason: HOSPADM

## 2019-08-08 RX ORDER — SODIUM CHLORIDE 9 MG/ML
INJECTION, SOLUTION INTRAVENOUS CONTINUOUS
Status: DISCONTINUED | OUTPATIENT
Start: 2019-08-09 | End: 2019-08-09 | Stop reason: HOSPADM

## 2019-08-08 RX ORDER — NITROGLYCERIN 0.4 MG/1
0.4 TABLET SUBLINGUAL EVERY 5 MIN PRN
Status: DISCONTINUED | OUTPATIENT
Start: 2019-08-08 | End: 2019-08-09 | Stop reason: HOSPADM

## 2019-08-08 RX ORDER — SODIUM CHLORIDE 0.9 % (FLUSH) 0.9 %
10 SYRINGE (ML) INJECTION PRN
Status: DISCONTINUED | OUTPATIENT
Start: 2019-08-08 | End: 2019-08-09 | Stop reason: HOSPADM

## 2019-08-08 RX ORDER — SODIUM CHLORIDE 0.9 % (FLUSH) 0.9 %
10 SYRINGE (ML) INJECTION EVERY 12 HOURS SCHEDULED
Status: DISCONTINUED | OUTPATIENT
Start: 2019-08-09 | End: 2019-08-09 | Stop reason: HOSPADM

## 2019-08-08 RX ORDER — ACETAMINOPHEN 650 MG/1
650 SUPPOSITORY RECTAL EVERY 4 HOURS PRN
Status: DISCONTINUED | OUTPATIENT
Start: 2019-08-08 | End: 2019-08-09 | Stop reason: HOSPADM

## 2019-08-08 RX ORDER — TAMSULOSIN HYDROCHLORIDE 0.4 MG/1
0.8 CAPSULE ORAL NIGHTLY
Status: DISCONTINUED | OUTPATIENT
Start: 2019-08-09 | End: 2019-08-09 | Stop reason: HOSPADM

## 2019-08-08 RX ORDER — ATORVASTATIN CALCIUM 40 MG/1
80 TABLET, FILM COATED ORAL NIGHTLY
Status: DISCONTINUED | OUTPATIENT
Start: 2019-08-09 | End: 2019-08-09 | Stop reason: HOSPADM

## 2019-08-08 RX ORDER — ASPIRIN 81 MG/1
81 TABLET, CHEWABLE ORAL DAILY
Status: DISCONTINUED | OUTPATIENT
Start: 2019-08-09 | End: 2019-08-09 | Stop reason: HOSPADM

## 2019-08-08 RX ORDER — CLOPIDOGREL BISULFATE 75 MG/1
75 TABLET ORAL DAILY
Status: DISCONTINUED | OUTPATIENT
Start: 2019-08-09 | End: 2019-08-09 | Stop reason: HOSPADM

## 2019-08-08 RX ORDER — ONDANSETRON 2 MG/ML
4 INJECTION INTRAMUSCULAR; INTRAVENOUS EVERY 6 HOURS PRN
Status: DISCONTINUED | OUTPATIENT
Start: 2019-08-08 | End: 2019-08-09 | Stop reason: HOSPADM

## 2019-08-08 ASSESSMENT — PAIN SCALES - GENERAL: PAINLEVEL_OUTOF10: 0

## 2019-08-09 ENCOUNTER — APPOINTMENT (OUTPATIENT)
Dept: NUCLEAR MEDICINE | Age: 74
End: 2019-08-09
Payer: MEDICARE

## 2019-08-09 VITALS
WEIGHT: 203.6 LBS | DIASTOLIC BLOOD PRESSURE: 70 MMHG | RESPIRATION RATE: 13 BRPM | TEMPERATURE: 98.2 F | BODY MASS INDEX: 26.98 KG/M2 | HEART RATE: 54 BPM | HEIGHT: 73 IN | SYSTOLIC BLOOD PRESSURE: 126 MMHG | OXYGEN SATURATION: 100 %

## 2019-08-09 LAB
ALBUMIN SERPL-MCNC: 3.9 GM/DL (ref 3.4–5)
ALP BLD-CCNC: 49 IU/L (ref 40–128)
ALT SERPL-CCNC: 28 U/L (ref 10–40)
ANION GAP SERPL CALCULATED.3IONS-SCNC: 8 MMOL/L (ref 4–16)
AST SERPL-CCNC: 21 IU/L (ref 15–37)
BILIRUB SERPL-MCNC: 0.6 MG/DL (ref 0–1)
BUN BLDV-MCNC: 16 MG/DL (ref 6–23)
CALCIUM SERPL-MCNC: 9.7 MG/DL (ref 8.3–10.6)
CHLORIDE BLD-SCNC: 105 MMOL/L (ref 99–110)
CHOLESTEROL: 129 MG/DL
CO2: 29 MMOL/L (ref 21–32)
CREAT SERPL-MCNC: 1 MG/DL (ref 0.9–1.3)
EKG ATRIAL RATE: 51 BPM
EKG ATRIAL RATE: 53 BPM
EKG DIAGNOSIS: NORMAL
EKG DIAGNOSIS: NORMAL
EKG P AXIS: 47 DEGREES
EKG P AXIS: 48 DEGREES
EKG P-R INTERVAL: 206 MS
EKG P-R INTERVAL: 208 MS
EKG Q-T INTERVAL: 448 MS
EKG Q-T INTERVAL: 460 MS
EKG QRS DURATION: 86 MS
EKG QRS DURATION: 92 MS
EKG QTC CALCULATION (BAZETT): 412 MS
EKG QTC CALCULATION (BAZETT): 431 MS
EKG R AXIS: -15 DEGREES
EKG R AXIS: -32 DEGREES
EKG T AXIS: 14 DEGREES
EKG T AXIS: 19 DEGREES
EKG VENTRICULAR RATE: 51 BPM
EKG VENTRICULAR RATE: 53 BPM
GFR AFRICAN AMERICAN: >60 ML/MIN/1.73M2
GFR NON-AFRICAN AMERICAN: >60 ML/MIN/1.73M2
GLUCOSE BLD-MCNC: 83 MG/DL (ref 70–99)
HCT VFR BLD CALC: 41.8 % (ref 42–52)
HDLC SERPL-MCNC: 57 MG/DL
HEMOGLOBIN: 13.3 GM/DL (ref 13.5–18)
LDL CHOLESTEROL DIRECT: 67 MG/DL
LIPASE: 25 IU/L (ref 13–60)
LV EF: 53 %
LV EF: 63 %
LVEF MODALITY: NORMAL
LVEF MODALITY: NORMAL
MCH RBC QN AUTO: 30.1 PG (ref 27–31)
MCHC RBC AUTO-ENTMCNC: 31.8 % (ref 32–36)
MCV RBC AUTO: 94.6 FL (ref 78–100)
PDW BLD-RTO: 12.4 % (ref 11.7–14.9)
PLATELET # BLD: 177 K/CU MM (ref 140–440)
PMV BLD AUTO: 11.1 FL (ref 7.5–11.1)
POTASSIUM SERPL-SCNC: 3.9 MMOL/L (ref 3.5–5.1)
RBC # BLD: 4.42 M/CU MM (ref 4.6–6.2)
SODIUM BLD-SCNC: 142 MMOL/L (ref 135–145)
TOTAL PROTEIN: 6.2 GM/DL (ref 6.4–8.2)
TRIGL SERPL-MCNC: 49 MG/DL
TROPONIN T: <0.01 NG/ML
TROPONIN T: <0.01 NG/ML
WBC # BLD: 5.8 K/CU MM (ref 4–10.5)

## 2019-08-09 PROCEDURE — G0378 HOSPITAL OBSERVATION PER HR: HCPCS

## 2019-08-09 PROCEDURE — 2580000003 HC RX 258: Performed by: NURSE PRACTITIONER

## 2019-08-09 PROCEDURE — 96374 THER/PROPH/DIAG INJ IV PUSH: CPT

## 2019-08-09 PROCEDURE — 80053 COMPREHEN METABOLIC PANEL: CPT

## 2019-08-09 PROCEDURE — 96372 THER/PROPH/DIAG INJ SC/IM: CPT

## 2019-08-09 PROCEDURE — 80061 LIPID PANEL: CPT

## 2019-08-09 PROCEDURE — 83690 ASSAY OF LIPASE: CPT

## 2019-08-09 PROCEDURE — 93010 ELECTROCARDIOGRAM REPORT: CPT | Performed by: INTERNAL MEDICINE

## 2019-08-09 PROCEDURE — 6370000000 HC RX 637 (ALT 250 FOR IP): Performed by: INTERNAL MEDICINE

## 2019-08-09 PROCEDURE — 85027 COMPLETE CBC AUTOMATED: CPT

## 2019-08-09 PROCEDURE — 85379 FIBRIN DEGRADATION QUANT: CPT

## 2019-08-09 PROCEDURE — 6360000002 HC RX W HCPCS: Performed by: INTERNAL MEDICINE

## 2019-08-09 PROCEDURE — 6360000002 HC RX W HCPCS: Performed by: NURSE PRACTITIONER

## 2019-08-09 PROCEDURE — 84484 ASSAY OF TROPONIN QUANT: CPT

## 2019-08-09 PROCEDURE — A9500 TC99M SESTAMIBI: HCPCS | Performed by: INTERNAL MEDICINE

## 2019-08-09 PROCEDURE — 93005 ELECTROCARDIOGRAM TRACING: CPT | Performed by: NURSE PRACTITIONER

## 2019-08-09 PROCEDURE — 2500000003 HC RX 250 WO HCPCS: Performed by: NURSE PRACTITIONER

## 2019-08-09 PROCEDURE — 78452 HT MUSCLE IMAGE SPECT MULT: CPT

## 2019-08-09 PROCEDURE — 36415 COLL VENOUS BLD VENIPUNCTURE: CPT

## 2019-08-09 PROCEDURE — 96376 TX/PRO/DX INJ SAME DRUG ADON: CPT

## 2019-08-09 PROCEDURE — 3430000000 HC RX DIAGNOSTIC RADIOPHARMACEUTICAL: Performed by: INTERNAL MEDICINE

## 2019-08-09 PROCEDURE — 83721 ASSAY OF BLOOD LIPOPROTEIN: CPT

## 2019-08-09 PROCEDURE — 93306 TTE W/DOPPLER COMPLETE: CPT

## 2019-08-09 PROCEDURE — 6370000000 HC RX 637 (ALT 250 FOR IP): Performed by: NURSE PRACTITIONER

## 2019-08-09 PROCEDURE — 93017 CV STRESS TEST TRACING ONLY: CPT

## 2019-08-09 RX ORDER — LISINOPRIL 10 MG/1
10 TABLET ORAL DAILY
Qty: 30 TABLET | Refills: 3 | Status: ON HOLD | OUTPATIENT
Start: 2019-08-09 | End: 2021-05-07 | Stop reason: HOSPADM

## 2019-08-09 RX ORDER — ASPIRIN 81 MG/1
81 TABLET, CHEWABLE ORAL ONCE
Status: COMPLETED | OUTPATIENT
Start: 2019-08-09 | End: 2019-08-09

## 2019-08-09 RX ORDER — LISINOPRIL 10 MG/1
10 TABLET ORAL DAILY
Status: DISCONTINUED | OUTPATIENT
Start: 2019-08-09 | End: 2019-08-09 | Stop reason: HOSPADM

## 2019-08-09 RX ORDER — CLOPIDOGREL BISULFATE 75 MG/1
75 TABLET ORAL ONCE
Status: COMPLETED | OUTPATIENT
Start: 2019-08-09 | End: 2019-08-09

## 2019-08-09 RX ORDER — TAMSULOSIN HYDROCHLORIDE 0.4 MG/1
0.8 CAPSULE ORAL DAILY
Status: DISCONTINUED | OUTPATIENT
Start: 2019-08-09 | End: 2019-08-09 | Stop reason: SDUPTHER

## 2019-08-09 RX ORDER — AMLODIPINE BESYLATE 5 MG/1
5 TABLET ORAL DAILY
Status: DISCONTINUED | OUTPATIENT
Start: 2019-08-09 | End: 2019-08-09 | Stop reason: HOSPADM

## 2019-08-09 RX ADMIN — SODIUM CHLORIDE: 9 INJECTION, SOLUTION INTRAVENOUS at 00:53

## 2019-08-09 RX ADMIN — FAMOTIDINE 20 MG: 10 INJECTION, SOLUTION INTRAVENOUS at 13:16

## 2019-08-09 RX ADMIN — CLOPIDOGREL BISULFATE 75 MG: 75 TABLET ORAL at 00:45

## 2019-08-09 RX ADMIN — REGADENOSON 0.4 MG: 0.08 INJECTION, SOLUTION INTRAVENOUS at 10:51

## 2019-08-09 RX ADMIN — ATORVASTATIN CALCIUM 80 MG: 40 TABLET, FILM COATED ORAL at 00:45

## 2019-08-09 RX ADMIN — ENOXAPARIN SODIUM 40 MG: 40 INJECTION SUBCUTANEOUS at 12:44

## 2019-08-09 RX ADMIN — Medication 10 ML: at 12:29

## 2019-08-09 RX ADMIN — Medication 10 MILLICURIE: at 09:30

## 2019-08-09 RX ADMIN — TAMSULOSIN HYDROCHLORIDE 0.8 MG: 0.4 CAPSULE ORAL at 00:45

## 2019-08-09 RX ADMIN — ASPIRIN 81 MG 81 MG: 81 TABLET ORAL at 00:45

## 2019-08-09 RX ADMIN — FAMOTIDINE 20 MG: 10 INJECTION, SOLUTION INTRAVENOUS at 00:45

## 2019-08-09 RX ADMIN — Medication 30 MILLICURIE: at 11:00

## 2019-08-09 RX ADMIN — CARVEDILOL 12.5 MG: 12.5 TABLET, FILM COATED ORAL at 12:43

## 2019-08-09 ASSESSMENT — PAIN SCALES - GENERAL
PAINLEVEL_OUTOF10: 0

## 2019-08-09 NOTE — CONSULTS
[address] [place] [address], [address] [facility]                                  CONSULTATION    PATIENT NAME: Ella Dumas                       :        1945  MED REC NO:   [id number]                          ROOM:       7840  ACCOUNT NO:   [de-identified]                           ADMIT DATE: 2019  PROVIDER:     Daniella Santana MD    CONSULT DATE:  2019    INDICATION:  Chest pain. HISTORY OF PRESENT ILLNESS:  This is a 78-year-old male patient who was  known to have a coronary artery disease. He underwent a heart  catheterization with  _____ in the ER. He did an angioplasty on him  in 2019. Left main was patent. LAD had 80% stenosis, which was  stented. Ramus had mild disease. Circ was a dominant system, mild  disease. RCA was nondominant, was patent. The patient was treated  medically for that. The patient was started on Plavix. Now he comes in  with chest pain/pressure present. No fever, no chills. No cough or  sputum production. No other  or GI complaints present. No syncopal  episode present. PAST MEDICAL HISTORY:  Coronary artery disease, status post angioplasty  done in 2019, history of hypertension, hyperlipidemia present,  parathyroid adenoma present. PAST SURGICAL HISTORY:  Angioplasty, prostate biopsy, and vasectomy  done. SOCIAL HISTORY:  He does not smoke and does not drink. ALLERGIES:  SULFA. MEDICATIONS AT HOME:  He is on lisinopril 2.5 mg a day, aspirin 81 mg a  day, Lipitor 80 mg a day, Coreg 12.5 mg b.i.d., and Plavix. PHYSICAL EXAMINATION:  GENERAL:  The patient is awake, alert, answering questions, not in acute  distress. VITAL SIGNS:  Temperature is afebrile, pulse is 56, blood pressure  162/89. HEENT:  Head is normocephalic, atraumatic. Pupils are equal and  reactive. CHEST:  Equal expansion. LUNGS:  Clear to auscultation. No wheezing or rhonchi.   HEART: Regular rhythm. ABDOMEN:  Soft, nontender. Bowel sounds present. No hepatosplenomegaly  or guarding appreciated. EXTREMITIES:  No cyanosis or clubbing noted. NEUROLOGIC:  Cranial nerves II through XII are grossly intact. DIAGNOSTIC DATA:  EKG shows sinus rhythm. No acute ST changes present. LABORATORY DATA:  Troponins are negative. BUN and creatinine are stable  at 16 and 1.0. Total cholesterol 129. LFTs normal.  CBC is within  normal range. IMPRESSION:  This is a 36-year-old male patient who comes in with chest  pain and he is known to have coronary artery disease present. He  appears stable at this time. We will get a stress test to reevaluate  him. My suspicion is low for coronary artery disease, but I think his  blood pressure is elevated, and I will adjust the blood pressure  medications. I will check a lipase also. Further recommendations based  on the hospital course.         Caity Dubois MD    D: 08/09/2019 9:06:49       T: 08/09/2019 13:15:28     [email or web address]  Job#: [id number]     Doc#: [id number]    CC:

## 2019-08-09 NOTE — PROGRESS NOTES
Skin assessment completed per this nurse and Marianela LUIS. Skin remains intact. Bilateral heels are dry. Small scattered areas to BLE/scarred. No open areas observed during skin assessment.

## 2019-08-09 NOTE — H&P
present but states if he tried to get up and move around pain would return. Ten point ROS: reviewed negative, unless as noted in above HPI. Objective:   No intake or output data in the 24 hours ending 08/08/19 2310     Vitals:   Vitals:    08/08/19 2017 08/08/19 2231   BP: (!) 170/90 (!) 177/90   Pulse: 60 55   Resp: 16 13   Temp: 98.1 °F (36.7 °C)    TempSrc: Oral    SpO2: 100% 100%   Weight: 204 lb (92.5 kg)    Height: 6' 1\" (1.854 m)        Physical Exam: 08/08/19     Gen:  awake, alert, cooperative, no apparent distress normal body habitus  Head/Eyes:  Normocephalic atraumatic, EOMI   NECK:   symmetrical, trachea midline  LUNGS: Normal Effort/ symmetry movement   CARDIOVASCULAR:  Normal rate Tele SR  ABDOMEN: Non tender, non distended, no HSM noted. MUSCULOSKELETAL: no gross deformities  NEUROLOGIC: Alert and Oriented,  Cranial nerves II-XII are grossly intact. SKIN:  no bruising or bleeding, normal skin color,  no redness      Past Medical History:      Past Medical History:   Diagnosis Date    Acid reflux     Diverticulitis Last Episode In 2013    Hx of blood clots Dx 2013    \"Left Arm\"    Hyperlipidemia     Hypertension     Parathyroid adenoma     Prostate Cancer Dx 2016    2 Prostate Biopsies Done, Last Done In 2017    Teeth missing     Upper And Lower    Wears glasses     Wears partial dentures     Upper       Past Surgical  History:    has a past surgical history that includes Vasectomy (1986); Prostate biopsy (2016, 2017); Dental surgery; Cardiac catheterization (2015); Colonoscopy (Last Done In 2000's); Hemorrhoid surgery (1958); and parathyroidectomy (Right, 09/04/2018). Social History:    FAM HX: Reviewed  family history includes Mental Illness in his mother; Obesity in his mother; Stroke in his mother.     Soc HX:    Social History     Socioeconomic History    Marital status:      Spouse name: None    Number of children: None    Years of education: None    Highest upon chest pain, wait 5 minutes and may repeat up to 3 doses in 15 minutes. Do not crush or break. 3/6/19   Kaila Deleon MD   Mineral Oil OIL Take by mouth daily Over The Counter    Historical Provider, MD   tamsulosin (FLOMAX) 0.4 MG capsule Take 0.8 mg by mouth nightly     Historical Provider, MD         Medications:   Medications:    Infusions:   PRN Meds:     Data:     Laboratory this visit:  Reviewed  Recent Labs     08/08/19  2200   WBC 5.3   HGB 13.2*   HCT 42.0         Recent Labs     08/08/19 2200      K 4.3      CO2 26   BUN 17   CREATININE 1.1     Recent Labs     08/08/19 2200   AST 21   ALT 28   BILITOT 0.6   ALKPHOS 50     Radiology this visit:  Reviewed. Xr Chest Portable    Result Date: 8/8/2019  EXAMINATION: ONE XRAY VIEW OF THE CHEST 8/8/2019 8:28 pm COMPARISON: Chest x-ray March 4, 2019 HISTORY: ORDERING SYSTEM PROVIDED HISTORY: Chest pain TECHNOLOGIST PROVIDED HISTORY: Reason for exam:->Chest pain Reason for Exam: chest pain Acuity: Acute Type of Exam: Initial Additional signs and symptoms: na Relevant Medical/Surgical History: hypertension, prostate cancer FINDINGS: Cardiac silhouette is stable. No focal consolidation, pleural effusion, or pneumothorax identified. Osseous structures appear intact. 1. No acute cardiopulmonary process identified.      EKG this visit:  Reviewed         Electronically signed by DAYNA Cruz CNP on 8/8/2019 at 11:10 PM

## 2019-08-09 NOTE — ED PROVIDER NOTES
90 tablet 0    nitroGLYCERIN (NITROSTAT) 0.4 MG SL tablet Place 1 tablet under tongue upon chest pain, wait 5 minutes and may repeat up to 3 doses in 15 minutes. Do not crush or break. 30 tablet 0    Mineral Oil OIL Take by mouth daily Over The Counter      tamsulosin (FLOMAX) 0.4 MG capsule Take 0.8 mg by mouth nightly        Allergies   Allergen Reactions    Sulfa Antibiotics      \"Tongue Coats\"       Nursing Notes Reviewed    Physical Exam:  Triage VS:    ED Triage Vitals [08/08/19 2017]   Enc Vitals Group      BP (!) 170/90      Pulse 60      Resp 16      Temp 98.1 °F (36.7 °C)      Temp Source Oral      SpO2 100 %      Weight 204 lb (92.5 kg)      Height 6' 1\" (1.854 m)      Head Circumference       Peak Flow       Pain Score       Pain Loc       Pain Edu? Excl. in [address]? My pulse ox interpretation is - normal    General appearance:  No acute distress. Skin:  Warm. Dry. Eye:  Extraocular movements intact. Ears, nose, mouth and throat:  Oral mucosa moist   Neck:  Trachea midline. Extremity:  No swelling. Normal ROM     Heart:  Regular rate and rhythm, normal S1 & S2, no extra heart sounds. Perfusion:  intact  Respiratory:  Lungs clear to auscultation bilaterally. Respirations nonlabored. Abdominal:  Normal bowel sounds. Soft. Nontender. Non distended. Neurological:  Alert and oriented times 3.            I have reviewed and interpreted all of the currently available lab results from this visit (if applicable):  Results for orders placed or performed during the hospital encounter of 08/08/19   CBC auto diff   Result Value Ref Range    WBC 5.3 4.0 - 10.5 K/CU MM    RBC 4.35 (L) 4.6 - 6.2 M/CU MM    Hemoglobin 13.2 (L) 13.5 - 18.0 GM/DL    Hematocrit 42.0 42 - 52 %    MCV 96.6 78 - 100 FL    MCH 30.3 27 - 31 PG    MCHC 31.4 (L) 32.0 - 36.0 %    RDW 12.3 11.7 - 14.9 %    Platelets 517 292 - 595 K/CU MM    MPV 10.6 7.5 - 11.1 FL    Differential Type AUTOMATED DIFFERENTIAL     Segs

## 2019-08-13 ENCOUNTER — HOSPITAL ENCOUNTER (OUTPATIENT)
Dept: CARDIAC REHAB | Age: 74
Setting detail: THERAPIES SERIES
Discharge: HOME | End: 2019-08-13
Payer: MEDICARE

## 2019-08-13 PROCEDURE — 93798 PHYS/QHP OP CAR RHAB W/ECG: CPT

## 2019-08-14 ENCOUNTER — TELEPHONE (OUTPATIENT)
Dept: CARDIOLOGY CLINIC | Age: 74
End: 2019-08-14

## 2019-08-14 NOTE — DISCHARGE SUMMARY
Discharge Summary    Name:  Kenyon Martinez /Age/Sex: 1945  (68 y.o. male)   MRN & CSN:  [id number] & [id number] Admission Date/Time: 2019  9:43 PM   Attending:  No att. providers found Discharging Physician: Junnie Seip, MD     Hospital Course:   Kenyon Martinez is a 68 y.o.  male  who presents with Chest pain and was evaluated by Cardiology with a Stress Test which was negative. He was continued on GDMT and discharged to OP follow up. The patient expressed appropriate understanding of and agreement with the discharge recommendations, medications, and plan. Consults this admission:  IP CONSULT TO HOSPITALIST  IP CONSULT TO CARDIOLOGY    Discharge Instruction:   Follow up appointments: Cardiology  Primary care physician:  within 2 weeks    Diet:  cardiac diet   Activity: activity as tolerated  Disposition: Discharged to:   [x]Home, [][facility], []SNF, []Acute Rehab, []Hospice   Condition on discharge: Stable    Discharge Medications: Duaine Im   Home Medication Instructions SLQ:[id number]    Printed on:19 1653   Medication Information                      aspirin 81 MG chewable tablet  Take 1 tablet by mouth daily             atorvastatin (LIPITOR) 80 MG tablet  Take 1 tablet by mouth nightly             carvedilol (COREG) 12.5 MG tablet  Take 1 tablet by mouth 2 times daily (with meals) Hold if SBP < 100 or HR < 60 and call cardiology             clopidogrel (PLAVIX) 75 MG tablet  Take 1 tablet by mouth daily             lisinopril (PRINIVIL;ZESTRIL) 10 MG tablet  Take 1 tablet by mouth daily             Mineral Oil OIL  Take by mouth daily Over The Counter             nitroGLYCERIN (NITROSTAT) 0.4 MG SL tablet  Place 1 tablet under tongue upon chest pain, wait 5 minutes and may repeat up to 3 doses in 15 minutes. Do not crush or break.              tamsulosin (FLOMAX) 0.4 MG capsule  Take 0.8 mg by mouth nightly                  Objective Findings at Discharge:   /70   Pulse

## 2019-08-15 ENCOUNTER — HOSPITAL ENCOUNTER (OUTPATIENT)
Dept: CARDIAC REHAB | Age: 74
Setting detail: THERAPIES SERIES
Discharge: HOME | End: 2019-08-15
Payer: MEDICARE

## 2019-08-15 PROCEDURE — 93798 PHYS/QHP OP CAR RHAB W/ECG: CPT

## 2019-08-19 ENCOUNTER — HOSPITAL ENCOUNTER (OUTPATIENT)
Dept: CARDIAC REHAB | Age: 74
Setting detail: THERAPIES SERIES
Discharge: HOME | End: 2019-08-19
Payer: MEDICARE

## 2019-08-19 PROCEDURE — 93798 PHYS/QHP OP CAR RHAB W/ECG: CPT

## 2019-08-20 ENCOUNTER — HOSPITAL ENCOUNTER (OUTPATIENT)
Dept: CARDIAC REHAB | Age: 74
Setting detail: THERAPIES SERIES
Discharge: HOME | End: 2019-08-20
Payer: MEDICARE

## 2019-08-20 ENCOUNTER — HOSPITAL ENCOUNTER (OUTPATIENT)
Age: 74
Discharge: HOME | End: 2019-08-20
Payer: MEDICARE

## 2019-08-20 LAB
CHOLESTEROL: 118 MG/DL
HDLC SERPL-MCNC: 54 MG/DL
LDL CHOLESTEROL DIRECT: 60 MG/DL
TRIGL SERPL-MCNC: 45 MG/DL

## 2019-08-20 PROCEDURE — 80061 LIPID PANEL: CPT

## 2019-08-20 PROCEDURE — 93798 PHYS/QHP OP CAR RHAB W/ECG: CPT

## 2019-08-20 PROCEDURE — 83721 ASSAY OF BLOOD LIPOPROTEIN: CPT

## 2019-08-20 PROCEDURE — 36415 COLL VENOUS BLD VENIPUNCTURE: CPT

## 2019-09-03 ENCOUNTER — HOSPITAL ENCOUNTER (OUTPATIENT)
Dept: CARDIAC REHAB | Age: 74
Setting detail: THERAPIES SERIES
Discharge: HOME | End: 2019-09-03
Payer: MEDICARE

## 2019-09-03 PROCEDURE — 93798 PHYS/QHP OP CAR RHAB W/ECG: CPT

## 2019-09-12 ENCOUNTER — HOSPITAL ENCOUNTER (OUTPATIENT)
Dept: CARDIAC REHAB | Age: 74
Setting detail: THERAPIES SERIES
Discharge: HOME | End: 2019-09-12
Payer: MEDICARE

## 2019-09-12 PROCEDURE — 93798 PHYS/QHP OP CAR RHAB W/ECG: CPT

## 2019-09-16 ENCOUNTER — HOSPITAL ENCOUNTER (OUTPATIENT)
Dept: CARDIAC REHAB | Age: 74
Setting detail: THERAPIES SERIES
Discharge: HOME | End: 2019-09-16
Payer: MEDICARE

## 2019-09-16 PROCEDURE — 93798 PHYS/QHP OP CAR RHAB W/ECG: CPT

## 2020-08-10 ENCOUNTER — HOSPITAL ENCOUNTER (OUTPATIENT)
Dept: CT IMAGING | Age: 75
Discharge: HOME | End: 2020-08-10
Payer: MEDICARE

## 2020-08-10 LAB
GFR AFRICAN AMERICAN: >60 ML/MIN/1.73M2
GFR NON-AFRICAN AMERICAN: >60 ML/MIN/1.73M2
POC CREATININE: 1.1 MG/DL (ref 0.9–1.3)

## 2020-08-10 PROCEDURE — 74177 CT ABD & PELVIS W/CONTRAST: CPT

## 2020-08-10 PROCEDURE — 6360000004 HC RX CONTRAST MEDICATION: Performed by: FAMILY MEDICINE

## 2020-08-10 PROCEDURE — 2580000003 HC RX 258: Performed by: FAMILY MEDICINE

## 2020-08-10 RX ORDER — SODIUM CHLORIDE 0.9 % (FLUSH) 0.9 %
10 SYRINGE (ML) INJECTION ONCE
Status: COMPLETED | OUTPATIENT
Start: 2020-08-10 | End: 2020-08-10

## 2020-08-10 RX ADMIN — IOPAMIDOL 75 ML: 755 INJECTION, SOLUTION INTRAVENOUS at 14:36

## 2020-08-10 RX ADMIN — Medication 10 ML: at 14:37

## 2020-08-10 RX ADMIN — IOHEXOL 50 ML: 240 INJECTION, SOLUTION INTRATHECAL; INTRAVASCULAR; INTRAVENOUS; ORAL at 14:36

## 2020-08-12 ENCOUNTER — HOSPITAL ENCOUNTER (OUTPATIENT)
Age: 75
Discharge: HOME | End: 2020-08-12
Payer: MEDICARE

## 2020-08-12 LAB — PSA ULTRASENSITIVE: 7.51 NG/ML (ref 0–4)

## 2020-08-12 PROCEDURE — 36415 COLL VENOUS BLD VENIPUNCTURE: CPT

## 2020-08-12 PROCEDURE — 84153 ASSAY OF PSA TOTAL: CPT

## 2020-11-03 PROBLEM — R07.9 CHEST PAIN: Status: RESOLVED | Noted: 2019-08-08 | Resolved: 2020-11-03

## 2021-02-17 ENCOUNTER — HOSPITAL ENCOUNTER (OUTPATIENT)
Age: 76
Discharge: HOME | End: 2021-02-17
Payer: MEDICARE

## 2021-02-17 LAB — PSA ULTRASENSITIVE: 6.78 NG/ML (ref 0–4)

## 2021-02-17 PROCEDURE — 36415 COLL VENOUS BLD VENIPUNCTURE: CPT

## 2021-02-17 PROCEDURE — 84153 ASSAY OF PSA TOTAL: CPT

## 2021-05-05 ENCOUNTER — APPOINTMENT (OUTPATIENT)
Dept: CT IMAGING | Age: 76
End: 2021-05-05
Payer: MEDICARE

## 2021-05-05 ENCOUNTER — APPOINTMENT (OUTPATIENT)
Dept: GENERAL RADIOLOGY | Age: 76
End: 2021-05-05
Payer: MEDICARE

## 2021-05-05 ENCOUNTER — HOSPITAL ENCOUNTER (OUTPATIENT)
Age: 76
Setting detail: OBSERVATION
Discharge: HOME | End: 2021-05-07
Attending: INTERNAL MEDICINE | Admitting: INTERNAL MEDICINE
Payer: MEDICARE

## 2021-05-05 DIAGNOSIS — R07.9 CHEST PAIN, UNSPECIFIED TYPE: Primary | ICD-10-CM

## 2021-05-05 LAB
ALBUMIN SERPL-MCNC: 4.1 GM/DL (ref 3.4–5)
ALP BLD-CCNC: 54 IU/L (ref 40–129)
ALT SERPL-CCNC: 24 U/L (ref 10–40)
ANION GAP SERPL CALCULATED.3IONS-SCNC: 6 MMOL/L (ref 4–16)
APTT: 25.1 SECONDS (ref 25.1–37.1)
AST SERPL-CCNC: 26 IU/L (ref 15–37)
BASOPHILS ABSOLUTE: 0 K/CU MM
BASOPHILS RELATIVE PERCENT: 0.6 % (ref 0–1)
BILIRUB SERPL-MCNC: 0.9 MG/DL (ref 0–1)
BUN BLDV-MCNC: 16 MG/DL (ref 6–23)
CALCIUM SERPL-MCNC: 9.7 MG/DL (ref 8.3–10.6)
CHLORIDE BLD-SCNC: 102 MMOL/L (ref 99–110)
CO2: 27 MMOL/L (ref 21–32)
CREAT SERPL-MCNC: 1.1 MG/DL (ref 0.9–1.3)
DIFFERENTIAL TYPE: ABNORMAL
EKG ATRIAL RATE: 55 BPM
EKG DIAGNOSIS: NORMAL
EKG P AXIS: 49 DEGREES
EKG P-R INTERVAL: 220 MS
EKG Q-T INTERVAL: 436 MS
EKG QRS DURATION: 98 MS
EKG QTC CALCULATION (BAZETT): 417 MS
EKG R AXIS: -21 DEGREES
EKG T AXIS: 13 DEGREES
EKG VENTRICULAR RATE: 55 BPM
EOSINOPHILS ABSOLUTE: 0.3 K/CU MM
EOSINOPHILS RELATIVE PERCENT: 6 % (ref 0–3)
GFR AFRICAN AMERICAN: >60 ML/MIN/1.73M2
GFR NON-AFRICAN AMERICAN: >60 ML/MIN/1.73M2
GLUCOSE BLD-MCNC: 93 MG/DL (ref 70–99)
HCT VFR BLD CALC: 42.2 % (ref 42–52)
HEMOGLOBIN: 14 GM/DL (ref 13.5–18)
IMMATURE NEUTROPHIL %: 0.2 % (ref 0–0.43)
INR BLD: 1.15 INDEX
LV EF: 60 %
LVEF MODALITY: NORMAL
LYMPHOCYTES ABSOLUTE: 1.8 K/CU MM
LYMPHOCYTES RELATIVE PERCENT: 38.7 % (ref 24–44)
MAGNESIUM: 1.9 MG/DL (ref 1.8–2.4)
MCH RBC QN AUTO: 30.8 PG (ref 27–31)
MCHC RBC AUTO-ENTMCNC: 33.2 % (ref 32–36)
MCV RBC AUTO: 93 FL (ref 78–100)
MONOCYTES ABSOLUTE: 0.4 K/CU MM
MONOCYTES RELATIVE PERCENT: 7.6 % (ref 0–4)
NUCLEATED RBC %: 0 %
PDW BLD-RTO: 12.6 % (ref 11.7–14.9)
PLATELET # BLD: 183 K/CU MM (ref 140–440)
PMV BLD AUTO: 10.8 FL (ref 7.5–11.1)
POTASSIUM SERPL-SCNC: 4 MMOL/L (ref 3.5–5.1)
PRO-BNP: 189.1 PG/ML
PROTHROMBIN TIME: 13.9 SECONDS (ref 11.7–14.5)
RBC # BLD: 4.54 M/CU MM (ref 4.6–6.2)
SEGMENTED NEUTROPHILS ABSOLUTE COUNT: 2.2 K/CU MM
SEGMENTED NEUTROPHILS RELATIVE PERCENT: 46.9 % (ref 36–66)
SODIUM BLD-SCNC: 135 MMOL/L (ref 135–145)
T4 FREE: 1.13 NG/DL (ref 0.9–1.8)
TOTAL IMMATURE NEUTOROPHIL: 0.01 K/CU MM
TOTAL NUCLEATED RBC: 0 K/CU MM
TOTAL PROTEIN: 6.9 GM/DL (ref 6.4–8.2)
TROPONIN T: <0.01 NG/ML
TSH HIGH SENSITIVITY: 1.39 UIU/ML (ref 0.27–4.2)
WBC # BLD: 4.6 K/CU MM (ref 4–10.5)

## 2021-05-05 PROCEDURE — 85730 THROMBOPLASTIN TIME PARTIAL: CPT

## 2021-05-05 PROCEDURE — 84439 ASSAY OF FREE THYROXINE: CPT

## 2021-05-05 PROCEDURE — 6360000002 HC RX W HCPCS: Performed by: INTERNAL MEDICINE

## 2021-05-05 PROCEDURE — 71275 CT ANGIOGRAPHY CHEST: CPT

## 2021-05-05 PROCEDURE — 83735 ASSAY OF MAGNESIUM: CPT

## 2021-05-05 PROCEDURE — 93306 TTE W/DOPPLER COMPLETE: CPT

## 2021-05-05 PROCEDURE — 96372 THER/PROPH/DIAG INJ SC/IM: CPT

## 2021-05-05 PROCEDURE — 72100 X-RAY EXAM L-S SPINE 2/3 VWS: CPT

## 2021-05-05 PROCEDURE — 84443 ASSAY THYROID STIM HORMONE: CPT

## 2021-05-05 PROCEDURE — 85610 PROTHROMBIN TIME: CPT

## 2021-05-05 PROCEDURE — 93010 ELECTROCARDIOGRAM REPORT: CPT | Performed by: INTERNAL MEDICINE

## 2021-05-05 PROCEDURE — G0378 HOSPITAL OBSERVATION PER HR: HCPCS

## 2021-05-05 PROCEDURE — 93005 ELECTROCARDIOGRAM TRACING: CPT | Performed by: EMERGENCY MEDICINE

## 2021-05-05 PROCEDURE — 36415 COLL VENOUS BLD VENIPUNCTURE: CPT

## 2021-05-05 PROCEDURE — 85025 COMPLETE CBC W/AUTO DIFF WBC: CPT

## 2021-05-05 PROCEDURE — 72050 X-RAY EXAM NECK SPINE 4/5VWS: CPT

## 2021-05-05 PROCEDURE — 84484 ASSAY OF TROPONIN QUANT: CPT

## 2021-05-05 PROCEDURE — 99285 EMERGENCY DEPT VISIT HI MDM: CPT

## 2021-05-05 PROCEDURE — 6360000004 HC RX CONTRAST MEDICATION: Performed by: PHYSICIAN ASSISTANT

## 2021-05-05 PROCEDURE — 6370000000 HC RX 637 (ALT 250 FOR IP): Performed by: INTERNAL MEDICINE

## 2021-05-05 PROCEDURE — 83880 ASSAY OF NATRIURETIC PEPTIDE: CPT

## 2021-05-05 PROCEDURE — 80053 COMPREHEN METABOLIC PANEL: CPT

## 2021-05-05 PROCEDURE — 2580000003 HC RX 258: Performed by: INTERNAL MEDICINE

## 2021-05-05 PROCEDURE — 71046 X-RAY EXAM CHEST 2 VIEWS: CPT

## 2021-05-05 PROCEDURE — 94761 N-INVAS EAR/PLS OXIMETRY MLT: CPT

## 2021-05-05 RX ORDER — ASPIRIN 81 MG/1
81 TABLET, CHEWABLE ORAL DAILY
Status: DISCONTINUED | OUTPATIENT
Start: 2021-05-05 | End: 2021-05-07 | Stop reason: HOSPADM

## 2021-05-05 RX ORDER — LISINOPRIL 10 MG/1
10 TABLET ORAL DAILY
Status: DISCONTINUED | OUTPATIENT
Start: 2021-05-05 | End: 2021-05-06

## 2021-05-05 RX ORDER — SODIUM CHLORIDE 0.9 % (FLUSH) 0.9 %
5-40 SYRINGE (ML) INJECTION PRN
Status: DISCONTINUED | OUTPATIENT
Start: 2021-05-05 | End: 2021-05-07 | Stop reason: HOSPADM

## 2021-05-05 RX ORDER — ACETAMINOPHEN 325 MG/1
650 TABLET ORAL EVERY 6 HOURS PRN
Status: DISCONTINUED | OUTPATIENT
Start: 2021-05-05 | End: 2021-05-07 | Stop reason: HOSPADM

## 2021-05-05 RX ORDER — ACETAMINOPHEN 325 MG/1
650 TABLET ORAL EVERY 4 HOURS PRN
Status: DISCONTINUED | OUTPATIENT
Start: 2021-05-05 | End: 2021-05-05 | Stop reason: SDUPTHER

## 2021-05-05 RX ORDER — ACETAMINOPHEN 650 MG/1
650 SUPPOSITORY RECTAL EVERY 6 HOURS PRN
Status: DISCONTINUED | OUTPATIENT
Start: 2021-05-05 | End: 2021-05-07 | Stop reason: HOSPADM

## 2021-05-05 RX ORDER — ATORVASTATIN CALCIUM 80 MG/1
80 TABLET, FILM COATED ORAL NIGHTLY
Status: DISCONTINUED | OUTPATIENT
Start: 2021-05-05 | End: 2021-05-06

## 2021-05-05 RX ORDER — SODIUM CHLORIDE 9 MG/ML
25 INJECTION, SOLUTION INTRAVENOUS PRN
Status: DISCONTINUED | OUTPATIENT
Start: 2021-05-05 | End: 2021-05-07 | Stop reason: HOSPADM

## 2021-05-05 RX ORDER — 0.9 % SODIUM CHLORIDE 0.9 %
10 VIAL (ML) INJECTION
Status: DISCONTINUED | OUTPATIENT
Start: 2021-05-05 | End: 2021-05-05

## 2021-05-05 RX ORDER — CYCLOBENZAPRINE HCL 10 MG
10 TABLET ORAL 3 TIMES DAILY PRN
Status: DISCONTINUED | OUTPATIENT
Start: 2021-05-05 | End: 2021-05-07 | Stop reason: HOSPADM

## 2021-05-05 RX ORDER — POLYETHYLENE GLYCOL 3350 17 G/17G
17 POWDER, FOR SOLUTION ORAL DAILY PRN
Status: DISCONTINUED | OUTPATIENT
Start: 2021-05-05 | End: 2021-05-07 | Stop reason: HOSPADM

## 2021-05-05 RX ORDER — SODIUM CHLORIDE 0.9 % (FLUSH) 0.9 %
5-40 SYRINGE (ML) INJECTION EVERY 12 HOURS SCHEDULED
Status: DISCONTINUED | OUTPATIENT
Start: 2021-05-05 | End: 2021-05-07 | Stop reason: HOSPADM

## 2021-05-05 RX ORDER — TAMSULOSIN HYDROCHLORIDE 0.4 MG/1
0.8 CAPSULE ORAL NIGHTLY
Status: DISCONTINUED | OUTPATIENT
Start: 2021-05-05 | End: 2021-05-06

## 2021-05-05 RX ORDER — PROMETHAZINE HYDROCHLORIDE 25 MG/1
12.5 TABLET ORAL EVERY 6 HOURS PRN
Status: DISCONTINUED | OUTPATIENT
Start: 2021-05-05 | End: 2021-05-07 | Stop reason: HOSPADM

## 2021-05-05 RX ORDER — CARVEDILOL 12.5 MG/1
12.5 TABLET ORAL 2 TIMES DAILY WITH MEALS
Status: CANCELLED | OUTPATIENT
Start: 2021-05-05

## 2021-05-05 RX ORDER — ONDANSETRON 2 MG/ML
4 INJECTION INTRAMUSCULAR; INTRAVENOUS EVERY 6 HOURS PRN
Status: DISCONTINUED | OUTPATIENT
Start: 2021-05-05 | End: 2021-05-07 | Stop reason: HOSPADM

## 2021-05-05 RX ORDER — NITROGLYCERIN 0.4 MG/1
0.4 TABLET SUBLINGUAL EVERY 5 MIN PRN
Status: DISCONTINUED | OUTPATIENT
Start: 2021-05-05 | End: 2021-05-07 | Stop reason: HOSPADM

## 2021-05-05 RX ADMIN — TAMSULOSIN HYDROCHLORIDE 0.8 MG: 0.4 CAPSULE ORAL at 21:47

## 2021-05-05 RX ADMIN — ATORVASTATIN CALCIUM 80 MG: 80 TABLET, FILM COATED ORAL at 21:47

## 2021-05-05 RX ADMIN — SODIUM CHLORIDE, PRESERVATIVE FREE 10 ML: 5 INJECTION INTRAVENOUS at 21:46

## 2021-05-05 RX ADMIN — ENOXAPARIN SODIUM 40 MG: 40 INJECTION SUBCUTANEOUS at 21:46

## 2021-05-05 RX ADMIN — LISINOPRIL 10 MG: 10 TABLET ORAL at 21:47

## 2021-05-05 RX ADMIN — IOPAMIDOL 75 ML: 755 INJECTION, SOLUTION INTRAVENOUS at 15:28

## 2021-05-05 RX ADMIN — ASPIRIN 81 MG CHEWABLE TABLET 81 MG: 81 TABLET CHEWABLE at 21:47

## 2021-05-05 NOTE — CONSULTS
[address], [address] [facility]                                  CONSULTATION    PATIENT NAME: Lea Dumont                       :        1945  MED REC NO:   [id number]                          ROOM:       ED27  ACCOUNT NO:   [de-identified]                           ADMIT DATE: 2021  PROVIDER:     Mike Walker MD    CONSULT DATE:  2021    INDICATION:  Chest pain    HISTORY OF PRESENT ILLNESS:  This patient is a 52-year-old patient who  came to hospital with chest pain for last 2 days. He also had pain in  the left arm present and in the left leg is also present. He denies any  fevers and chills. No cough or sputum production. No other GI or   complaints present. He is not dizzy, lightheaded or syncopal episode  present. The patient had an echo done in 2019. This test was negative for  ischemia. LV function was preserved. Moderate pulmonary regurgitation  is noted. The patient had a heart catheterization done back in 2019. Left main  is patent. LAD is 80% with stenosis noted which was stented and ramus  had mild disease. Circ was a dominant system, mild disease. RCA is  nondominant, is patent. PAST MEDICAL HISTORY:  History of coronary disease status post  angioplasty done in 2019 of the LAD. History of hypertension,  hyperlipidemia and arthritis present. Parathyroid adenoma present. History of prostate cancer present. PAST SURGICAL HISTORY:  Angioplasty of the LAD in 2019 and prostate  biopsy and vasectomy done. SOCIAL HISTORY:  The patient does not smoke, does not drink. ALLERGIES:  SULFA. MEDICATIONS:  He is on lisinopril 10 mg a day, aspirin 81 mg a day,  Lipitor 80 mg a day, Coreg 12.5 b.i.d. and I think he is taking 6.25  b.i.d., Plavix, and Flomax. PHYSICAL EXAMINATION:  GENERAL:  The patient is awake and alert, answering questions, not in  acute distress. VITAL SIGNS:  Temperature is afebrile and pulse is 50, sinus rhythm,  blood pressure is 125/80. HEENT:  Head is atraumatic. Pupils are equal and reactive. CHEST:  Equal expansion. LUNGS:  Clear to auscultation. No wheezing or rhonchi. HEART:  Regular rhythm. ABDOMEN:  Soft and nontender. Bowel sounds present. No  hepatosplenomegaly or guarding appreciated. EXTREMITIES:  No cyanosis or clubbing noted. NEUROLOGIC:  Cranial nerves II through XII are grossly intact. LABORATORY DATA:  BUN is 16, creatinine _____. Troponin is negative. BNP negative. LFTs are normal.  CBC is within normal range. Chest  x-ray is no acute process. CT chest is pending. EKG was sinus rhythm  present. IMPRESSION:  A 77year-old patient with a history of coronary artery  disease status post angioplasty done, comes with multiple complaints  present. Chest pain and short of breath present. Left arm pain  present. He is known to have coronary disease, did a heart cath on _____,  angioplasty done 2019. His echo in 09/2020 with LV function was  preserved. The plan at this time is to watch him overnight. We will  make further recommendation based on hospital course and I would wait  for CT results also.         Di León MD    D: 05/05/2021 14:21:54       T: 05/05/2021 14:28:37     [email or web address]  Job#: [id number]     Doc#: [id number]    CC:

## 2021-05-05 NOTE — H&P
History and Physical      Name:  Katie Campos /Age/Sex: 1945  (77 y.o. male)   MRN & CSN:  [id number] & [id number] Admission Date/Time: 2021 12:44 PM   Location:  ED27/ED-27 PCP: James Huerta MD       Hospital Day: 1    Assessment and Plan:   Katie Campos is a 77 y.o.  male  who presents with     #Chest pain r/o ACS  #Hx of CAD and PCI  -EKG; first-degree AV block new onset. Nonspecific T wave changes  -Troponin 0 0.01 ,trend trop  -Chest x-ray clear  -Last ECHO , EF 50 to 55%. Repeat  -Resume aspirin, lisinopril, Plavix and statin.  -Nitro as needed for pain  -Trend EKG  -BMP within normal limits  -Admit to telemetry  -Cardiology on board: N.p.o. at midnight    #back pain and neck pain: Lumbar and cervical radiculopathy  -Patient reports severe pain from his lower back to his left lower extremity. Similar pain from his neck to the left arm.  -X-ray C-spine and L-spine  -Tylenol as needed for pain  -Flexeril  -Consider gabapentin. Other chronic medical conditions: Resume home meds except for contraindicated  -HTN-hold Coreg due to first-degree heart block  -HLD  -BPH     Lovenox ppx    Diet No diet orders on file   DVT Prophylaxis [x] Lovenox, []  Heparin, [] SCDs, [] Ambulation   GI Prophylaxis [] PPI,  [] H2 Blocker,  [] Carafate,  [] Diet/Tube Feeds   Code Status Prior   Disposition Patient requires continued admission due to chest pain         History of Present Illness:     Chief Complaint: <principal problem not specified>  Katie Campos is a 77 y.o.  male  who presents with a 1 day history of chest pain that is retrosternal.  Pain lasted for about a minute while he was walking. Worse with exacerbation, relieved by rest.  Pain was intermittent, sharp. He denies associated dyspnea, palpitations, orthopnea, PND or leg swelling. He denies any sick contacts, chest trauma, cough, or other upper respiratory symptoms.   He had similar episode of chest pain in the past and was found to

## 2021-05-05 NOTE — ED NOTES
[phone] paged hospitalist     Tanisha Briscoe  05/05/21 [phone] hospitalist returned call      Tanisha Briscoe  05/05/21 8027

## 2021-05-05 NOTE — ED PROVIDER NOTES
EMERGENCY DEPARTMENT ENCOUNTER        PCP: Aayush Cazares MD    [address]    Chief Complaint   Patient presents with    Chest Pain    Arm Pain     left arm pain       This patient was not evaluated by the attending physician. I have independently evaluated this patient. HPI    Bobbi Marcial is a 77 y.o. male who presents with substernal chest pain which started approximately half hour ago. No known aggravating or alleviating factor. Patient states pain lasted approximately a minute. Patient states he did have cardiac stents placed approximately 2 and half years ago with cardiologist Dr. Leticia Ravi. Patient denies any shortness of breath, cough, fever, abdominal pain, vomiting or diarrhea. Patient states he does occasionally have left arm and left leg pain. Patient denies any current left arm or leg pain. Left arm and leg pain does not seem to be associated with chest pain. Patient states he has had a blood clot in his left arm previously after an IV. Patient has history of prostate cancer. Patient denies any rash. REVIEW OF SYSTEMS    Constitutional:  Denies fever, chills. HENT:  Denies sore throat or ear pain   Cardiovascular:  See HPI. Denies syncope   Respiratory:   See HPI.   Denies shortness of breath, or hemoptysis    GI:  Denies abdominal pain, vomiting, or diarrhea  :  Denies any urinary symptoms   Musculoskeletal: see HPI  Skin:  Denies rash  Neurologic:  Denies focal weakness or sensory changes   Lymphatic:  Denies swollen glands     All other review of systems are negative  See HPI and nursing notes for additional information     PAST MEDICAL & SURGICAL HISTORY    Past Medical History:   Diagnosis Date    Acid reflux     Diverticulitis Last Episode In 2013    Hx of blood clots Dx 2013    \"Left Arm\"    Hyperlipidemia     Hypertension     Parathyroid adenoma     Prostate Cancer Dx 2016    2 Prostate Biopsies Done, Last Done In 2017    Teeth missing     Upper And Lower    Wears glasses     Wears partial dentures     Upper     Past Surgical History:   Procedure Laterality Date    CARDIAC CATHETERIZATION  2015    COLONOSCOPY  Last Done In 2000's    Polyps Removed In Past    DENTAL SURGERY      Teeth Extracted In Past    [address] Right 09/04/2018    PROSTATE BIOPSY  2016, 2017    \"Prostate Cancer Dx In 2016\"    VASECTOMY  1986       CURRENT MEDICATIONS    Current Outpatient Rx   Medication Sig Dispense Refill    lisinopril (PRINIVIL;ZESTRIL) 10 MG tablet Take 1 tablet by mouth daily 30 tablet 3    aspirin 81 MG chewable tablet Take 1 tablet by mouth daily 90 tablet 0    atorvastatin (LIPITOR) 80 MG tablet Take 1 tablet by mouth nightly 90 tablet 0    carvedilol (COREG) 12.5 MG tablet Take 1 tablet by mouth 2 times daily (with meals) Hold if SBP < 100 or HR < 60 and call cardiology (Patient taking differently: Take 6.25 mg by mouth 2 times daily (with meals) Hold if SBP < 100 or HR < 60 and call cardiology) 180 tablet 0    clopidogrel (PLAVIX) 75 MG tablet Take 1 tablet by mouth daily 90 tablet 0    nitroGLYCERIN (NITROSTAT) 0.4 MG SL tablet Place 1 tablet under tongue upon chest pain, wait 5 minutes and may repeat up to 3 doses in 15 minutes. Do not crush or break.  30 tablet 0    Mineral Oil OIL Take by mouth daily Over The Counter      tamsulosin (FLOMAX) 0.4 MG capsule Take 0.8 mg by mouth nightly          ALLERGIES    Allergies   Allergen Reactions    Sulfa Antibiotics      \"Tongue [place]\"       SOCIAL & FAMILY HISTORY    Social History     Socioeconomic History    Marital status:      Spouse name: None    Number of children: None    Years of education: None    Highest education level: None   Occupational History    None   Social Needs    Financial resource strain: None    Food insecurity     Worry: None     Inability: None    Transportation needs     Medical: None     Non-medical: None   Tobacco Use    Smoking status: Never Smoker    Smokeless tobacco: Never Used   Substance and Sexual Activity    Alcohol use: Yes     Comment: \"A Couple Times A Week\"    Drug use: No    Sexual activity: Yes     Partners: Female   Lifestyle    Physical activity     Days per week: None     Minutes per session: None    Stress: None   Relationships    Social connections     Talks on phone: None     Gets together: None     Attends [employer] service: None     Active member of club or organization: None     Attends meetings of clubs or organizations: None     Relationship status: None    Intimate partner violence     Fear of current or ex partner: None     Emotionally abused: None     Physically abused: None     Forced sexual activity: None   Other Topics Concern    None   Social History Narrative    None     Family History   Problem Relation Age of Onset    Stroke Mother     Mental Illness Mother     Obesity Mother        PHYSICAL EXAM    VITAL SIGNS: BP (!) 166/102   Pulse 62   Temp 97.8 °F (36.6 °C) (Oral)   Resp 16   Ht 6' 1\" (1.854 m)   Wt 210 lb (95.3 kg)   SpO2 100%   BMI 27.71 kg/m²    Constitutional:  Well developed, well nourished, no acute distress   HENT:  Atraumatic, moist mucus membranes  Neck/Lymphatics: supple, no JVD, no swollen nodes  Respiratory:  Lungs Clear, no retractions   Cardiovascular:  Rate normal, normal Rhythm, no murmurs/rubs/gallops  Vascular: Radial pulses 2+ equal bilaterally  GI:  Soft, nontender, normal bowel sounds  Musculoskeletal:  No edema, no deformities. No upper or lower extremity tenderness to palpation. Vascular: Radial pulses equal and 2+ bilaterally, posterior tibialis pulses 2+ bilaterally  Integument:  Skin warm and dry, no petechiae   Neurologic:  Alert & oriented, normal speech  Psych: Pleasant affect, no hallucinations      EKG Interpretation  Please see ED physician's note for EKG interpretation      RADIOLOGY/PROCEDURES    CTA PULMONARY W CONTRAST   Final Result   1.  No pulmonary embolism. 2. Minimal subsegmental atelectasis posterior left lower lung. 3. Calcific atherosclerosis aorta and coronary arteries. XR CHEST (2 VW)   Final Result   Clear lungs. No acute cardiopulmonary abnormality.                   LABS:  Results for orders placed or performed during the hospital encounter of 05/05/21   CBC Auto Differential   Result Value Ref Range    WBC 4.6 4.0 - 10.5 K/CU MM    RBC 4.54 (L) 4.6 - 6.2 M/CU MM    Hemoglobin 14.0 13.5 - 18.0 GM/DL    Hematocrit 42.2 42 - 52 %    MCV 93.0 78 - 100 FL    MCH 30.8 27 - 31 PG    MCHC 33.2 32.0 - 36.0 %    RDW 12.6 11.7 - 14.9 %    Platelets 916 465 - 869 K/CU MM    MPV 10.8 7.5 - 11.1 FL    Differential Type AUTOMATED DIFFERENTIAL     Segs Relative 46.9 36 - 66 %    Lymphocytes % 38.7 24 - 44 %    Monocytes % 7.6 (H) 0 - 4 %    Eosinophils % 6.0 (H) 0 - 3 %    Basophils % 0.6 0 - 1 %    Segs Absolute 2.2 K/CU MM    Lymphocytes Absolute 1.8 K/CU MM    Monocytes Absolute 0.4 K/CU MM    Eosinophils Absolute 0.3 K/CU MM    Basophils Absolute 0.0 K/CU MM    Nucleated RBC % 0.0 %    Total Nucleated RBC 0.0 K/CU MM    Total Immature Neutrophil 0.01 K/CU MM    Immature Neutrophil % 0.2 0 - 0.43 %   Comprehensive Metabolic Panel w/ Reflex to MG   Result Value Ref Range    Sodium 135 135 - 145 MMOL/L    Potassium 4.0 3.5 - 5.1 MMOL/L    Chloride 102 99 - 110 mMol/L    CO2 27 21 - 32 MMOL/L    BUN 16 6 - 23 MG/DL    CREATININE 1.1 0.9 - 1.3 MG/DL    Glucose 93 70 - 99 MG/DL    Calcium 9.7 8.3 - 10.6 MG/DL    Albumin 4.1 3.4 - 5.0 GM/DL    Total Protein 6.9 6.4 - 8.2 GM/DL    Total Bilirubin 0.9 0.0 - 1.0 MG/DL    ALT 24 10 - 40 U/L    AST 26 15 - 37 IU/L    Alkaline Phosphatase 54 40 - 129 IU/L    GFR Non-African American >60 >60 mL/min/1.73m2    GFR African American >60 >60 mL/min/1.73m2    Anion Gap 6 4 - 16   Troponin   Result Value Ref Range    Troponin T <0.010 <0.01 NG/ML   Brain Natriuretic Peptide   Result Value Ref Range    Pro-.1 <300 PG/ML   TSH without Reflex   Result Value Ref Range    TSH, High Sensitivity 1.390 0.270 - 4.20 uIu/ml   T4, free   Result Value Ref Range    T4 Free 1.13 0.9 - 1.8 NG/DL   Protime/INR & PTT   Result Value Ref Range    Protime 13.9 11.7 - 14.5 SECONDS    INR 1.15 INDEX    aPTT 25.1 25.1 - 37.1 SECONDS   Magnesium   Result Value Ref Range    Magnesium 1.9 1.8 - 2.4 mg/dl   EKG 12 Lead   Result Value Ref Range    Ventricular Rate 55 BPM    Atrial Rate 55 BPM    P-R Interval 220 ms    QRS Duration 98 ms    Q-T Interval 436 ms    QTc Calculation (Bazett) 417 ms    P Axis 49 degrees    R Axis -21 degrees    T Axis 13 degrees    Diagnosis       Sinus bradycardia with sinus arrhythmia with 1st degree AV block  Incomplete right bundle branch block  Borderline ECG  When compared with ECG of 09-AUG-2019 06:05,  No significant change was found             ED COURSE & MEDICAL DECISION MAKING    Patient presents as above. See physician note for EKG reading. Patient declines aspirin, states he is on baby aspirin and Plavix. Troponin negative. CBC and CMP within normal notes. BNP is 189. Chest x-ray shows no acute process. I consulted patient's cardiologist Dr. Joy Delgado who evaluated patient at bedside and agrees with plan for admission and further evaluation. CT pulmonary with contrast shows no evidence of PE. Patient has a heart score of 5. Consult hospitalist accepts admission. Clinical  IMPRESSION    1. Chest pain, unspecified type        Patient admitted    Comment: Please note this report has been produced using speech recognition software and may contain errors related to that system including errors in grammar, punctuation, and spelling, as well as words and phrases that may be inappropriate. If there are any questions or concerns please feel free to contact the dictating provider for clarification.         Parmjit Ruiz PA-C  05/05/21 9301

## 2021-05-05 NOTE — ED PROVIDER NOTES
As physician-in-triage, I performed a medical screening history and physical exam on this patient. I performed a medical screening examination and evaluated this patient briefly june tele-health platform with the purpose of initiating their ED workup in an expeditious manner. Please see notes from other ED providers regarding comprehensive evaluation including full history, physical exam, interpretation of results, and medical decision making/disposition. CHIEF COMPLAINT  Chief Complaint   Patient presents with    Chest Pain    Arm Pain     left arm pain       HISTORY OF PRESENT ILLNESS  Alexandrea Bowling is a 77 y.o. male that presents to the emergency department for evaluation of chest pain. Patient notes over the past week, he has had on and off chest pain, usually last 1 to 2 seconds and then resolves. Since yesterday, he has had no persistent pain. Describes a dull, achy pain in left hemithorax. No radiating symptoms to jaw, neck, back, abdomen. He does have some paresthesias in the left upper extremity, is unsure if this is related to the chest pain. Denies sharp, ripping, tearing sensation through to the back. No history of DVT or PE. Patient is taking Plavix on a daily basis. Patient notes that last time he had similar symptoms, he ended up with a heart stent. He is concerned of cardiac pathology, decided come to the emergency department for evaluation. Denies nausea, vomiting, diaphoresis. Patient follows with Dr. Mary Jimenez. Review of prior records demonstrates most recent stress test in October 2019 which demonstrated no EKG changes suggestive of ischemia. Normal perfusion uptake. Echocardiogram performed in August 2019 demonstrated did left ventricular ejection fraction 50 to 55%. Grade 1 diastolic dysfunction. Cardiac catheterization with PCI in 2019 with drug-eluting stent in the LAD.        PHYSICAL EXAM  BP (!) 166/102   Pulse 72   Temp 98.5 °F (36.9 °C) (Oral)   Resp 18 Ht 6' 1\" (1.854 m)   Wt 210 lb (95.3 kg)   SpO2 100%   BMI 27.71 kg/m²     On exam, the patient appears in no acute distress. Speech is clear. Breathing is unlabored. Moves all extremities    Comment: Please note this report has been produced using speech recognition software and may contain errors related to that system including errors in grammar, punctuation, and spelling, as well as words and phrases that may be inappropriate. If there are any questions or concerns please feel free to contact the dictating provider for clarification.        [address] [facility],   05/05/21 5339

## 2021-05-06 ENCOUNTER — APPOINTMENT (OUTPATIENT)
Dept: NUCLEAR MEDICINE | Age: 76
End: 2021-05-06
Payer: MEDICARE

## 2021-05-06 LAB
ALBUMIN SERPL-MCNC: 3.8 GM/DL (ref 3.4–5)
ALP BLD-CCNC: 51 IU/L (ref 40–128)
ALT SERPL-CCNC: 26 U/L (ref 10–40)
ANION GAP SERPL CALCULATED.3IONS-SCNC: 7 MMOL/L (ref 4–16)
AST SERPL-CCNC: 24 IU/L (ref 15–37)
BILIRUB SERPL-MCNC: 0.8 MG/DL (ref 0–1)
BUN BLDV-MCNC: 16 MG/DL (ref 6–23)
CALCIUM SERPL-MCNC: 9.1 MG/DL (ref 8.3–10.6)
CHLORIDE BLD-SCNC: 104 MMOL/L (ref 99–110)
CO2: 26 MMOL/L (ref 21–32)
CREAT SERPL-MCNC: 1.1 MG/DL (ref 0.9–1.3)
EKG ATRIAL RATE: 50 BPM
EKG DIAGNOSIS: NORMAL
EKG P AXIS: 53 DEGREES
EKG P-R INTERVAL: 210 MS
EKG Q-T INTERVAL: 452 MS
EKG QRS DURATION: 94 MS
EKG QTC CALCULATION (BAZETT): 412 MS
EKG R AXIS: -27 DEGREES
EKG T AXIS: 3 DEGREES
EKG VENTRICULAR RATE: 50 BPM
GFR AFRICAN AMERICAN: >60 ML/MIN/1.73M2
GFR NON-AFRICAN AMERICAN: >60 ML/MIN/1.73M2
GLUCOSE BLD-MCNC: 87 MG/DL (ref 70–99)
HCT VFR BLD CALC: 41.4 % (ref 42–52)
HEMOGLOBIN: 13.7 GM/DL (ref 13.5–18)
LV EF: 67 %
LVEF MODALITY: NORMAL
MCH RBC QN AUTO: 30.6 PG (ref 27–31)
MCHC RBC AUTO-ENTMCNC: 33.1 % (ref 32–36)
MCV RBC AUTO: 92.6 FL (ref 78–100)
PDW BLD-RTO: 12.7 % (ref 11.7–14.9)
PLATELET # BLD: 165 K/CU MM (ref 140–440)
PMV BLD AUTO: 11.1 FL (ref 7.5–11.1)
POTASSIUM SERPL-SCNC: 4.4 MMOL/L (ref 3.5–5.1)
RBC # BLD: 4.47 M/CU MM (ref 4.6–6.2)
SODIUM BLD-SCNC: 137 MMOL/L (ref 135–145)
TOTAL PROTEIN: 6.2 GM/DL (ref 6.4–8.2)
WBC # BLD: 4.3 K/CU MM (ref 4–10.5)

## 2021-05-06 PROCEDURE — 6360000002 HC RX W HCPCS: Performed by: INTERNAL MEDICINE

## 2021-05-06 PROCEDURE — 85027 COMPLETE CBC AUTOMATED: CPT

## 2021-05-06 PROCEDURE — G0378 HOSPITAL OBSERVATION PER HR: HCPCS

## 2021-05-06 PROCEDURE — 93010 ELECTROCARDIOGRAM REPORT: CPT | Performed by: INTERNAL MEDICINE

## 2021-05-06 PROCEDURE — 3430000000 HC RX DIAGNOSTIC RADIOPHARMACEUTICAL: Performed by: INTERNAL MEDICINE

## 2021-05-06 PROCEDURE — 2580000003 HC RX 258: Performed by: INTERNAL MEDICINE

## 2021-05-06 PROCEDURE — 36415 COLL VENOUS BLD VENIPUNCTURE: CPT

## 2021-05-06 PROCEDURE — 6370000000 HC RX 637 (ALT 250 FOR IP): Performed by: INTERNAL MEDICINE

## 2021-05-06 PROCEDURE — 96372 THER/PROPH/DIAG INJ SC/IM: CPT

## 2021-05-06 PROCEDURE — 78452 HT MUSCLE IMAGE SPECT MULT: CPT

## 2021-05-06 PROCEDURE — 93017 CV STRESS TEST TRACING ONLY: CPT

## 2021-05-06 PROCEDURE — 94761 N-INVAS EAR/PLS OXIMETRY MLT: CPT

## 2021-05-06 PROCEDURE — 93005 ELECTROCARDIOGRAM TRACING: CPT | Performed by: INTERNAL MEDICINE

## 2021-05-06 PROCEDURE — 80053 COMPREHEN METABOLIC PANEL: CPT

## 2021-05-06 PROCEDURE — A9500 TC99M SESTAMIBI: HCPCS | Performed by: INTERNAL MEDICINE

## 2021-05-06 RX ORDER — MIDODRINE HYDROCHLORIDE 5 MG/1
5 TABLET ORAL
Status: DISCONTINUED | OUTPATIENT
Start: 2021-05-06 | End: 2021-05-07 | Stop reason: HOSPADM

## 2021-05-06 RX ORDER — ATORVASTATIN CALCIUM 20 MG/1
20 TABLET, FILM COATED ORAL NIGHTLY
Status: DISCONTINUED | OUTPATIENT
Start: 2021-05-06 | End: 2021-05-07 | Stop reason: HOSPADM

## 2021-05-06 RX ORDER — TAMSULOSIN HYDROCHLORIDE 0.4 MG/1
0.4 CAPSULE ORAL NIGHTLY
Status: DISCONTINUED | OUTPATIENT
Start: 2021-05-06 | End: 2021-05-07 | Stop reason: HOSPADM

## 2021-05-06 RX ORDER — SODIUM CHLORIDE 9 MG/ML
INJECTION, SOLUTION INTRAVENOUS CONTINUOUS
Status: DISCONTINUED | OUTPATIENT
Start: 2021-05-06 | End: 2021-05-07

## 2021-05-06 RX ADMIN — Medication 30 MILLICURIE: at 09:10

## 2021-05-06 RX ADMIN — SODIUM CHLORIDE: 9 INJECTION, SOLUTION INTRAVENOUS at 18:34

## 2021-05-06 RX ADMIN — ASPIRIN 81 MG CHEWABLE TABLET 81 MG: 81 TABLET CHEWABLE at 13:08

## 2021-05-06 RX ADMIN — REGADENOSON 0.4 MG: 0.08 INJECTION, SOLUTION INTRAVENOUS at 09:09

## 2021-05-06 RX ADMIN — ATORVASTATIN CALCIUM 20 MG: 20 TABLET, FILM COATED ORAL at 22:09

## 2021-05-06 RX ADMIN — SODIUM CHLORIDE: 9 INJECTION, SOLUTION INTRAVENOUS at 06:52

## 2021-05-06 RX ADMIN — TAMSULOSIN HYDROCHLORIDE 0.4 MG: 0.4 CAPSULE ORAL at 22:09

## 2021-05-06 RX ADMIN — Medication 10 MILLICURIE: at 07:40

## 2021-05-06 RX ADMIN — MIDODRINE HYDROCHLORIDE 5 MG: 5 TABLET ORAL at 13:07

## 2021-05-06 RX ADMIN — MIDODRINE HYDROCHLORIDE 5 MG: 5 TABLET ORAL at 15:58

## 2021-05-06 RX ADMIN — ENOXAPARIN SODIUM 40 MG: 40 INJECTION SUBCUTANEOUS at 18:38

## 2021-05-06 ASSESSMENT — PAIN SCALES - GENERAL
PAINLEVEL_OUTOF10: 0

## 2021-05-06 NOTE — CARE COORDINATION
Chart reviewed, in to see pt for dc planning. Introduced self and role explained. Pt was admitted for CP. States he lives at home with his wife/Alexandra and is independent with all needs. Explained he follows with PCP, has insurance with affordable RX co-pays and reliable transportation per himself or his wife. Discussed HHC and pt declined [address] need nor any other discharge needs. CM remains available if needs arise.

## 2021-05-06 NOTE — PROGRESS NOTES
Pt stated he stopped taking lisinopril due to low BP. Currently taking Coreg 6.25mg at home. Dr. Jses Daniels aware. Home medications updated. IV fluids started at this time.

## 2021-05-06 NOTE — PROGRESS NOTES
Daily Progress Note    Feeling ok. Heart rate is stable  Stress test negative for ischemia normal Ef  Hx of CAD  He had episode of hypotension while stress test  Stop all BP meds for now  Check holter  May be home tomorrow  He was on low dose BP meds at home     Pt. Awake, alert and feeling ok  HR stable, SB in the mid 46s, BP low but stable  No CP, SOB currently    Chest pain    Hx of CAD s/p PCI in 2019    Trop neg. CT chest neg. Echo today    Stress today- result pending, he had near syncope during it, possibly d/t HOTN    Will follow  Possible [facility] today if stress abnormal      PAST MEDICAL HISTORY:  History of coronary disease status post  angioplasty done in 09/2019 of the LAD. History of hypertension,  hyperlipidemia and arthritis present. Parathyroid adenoma present. History of prostate cancer present.     PAST SURGICAL HISTORY:  Angioplasty of the LAD in 03/2019 and prostate  biopsy and vasectomy done.     SOCIAL HISTORY:  The patient does not smoke, does not drink.     ALLERGIES:  SULFA.     MEDICATIONS:  He is on lisinopril 10 mg a day, aspirin 81 mg a day,  Lipitor 80 mg a day, Coreg 12.5 b.i.d. and I think he is taking 6.25  b.i.d., Plavix, and Flomax.   Objective:   BP (!) 94/56   Pulse 58   Temp 98.2 °F (36.8 °C) (Oral)   Resp 21   Ht 6' 1\" (1.854 m)   Wt 202 lb 7 oz (91.8 kg)   SpO2 99%   BMI 26.71 kg/m²       Intake/Output Summary (Last 24 hours) at 5/6/2021 1058  Last data filed at 5/6/2021 0221  Gross per 24 hour   Intake --   Output 675 ml   Net -675 ml       Medications:   Scheduled Meds:   atorvastatin  20 mg Oral Nightly    tamsulosin  0.8 mg Oral Nightly    aspirin  81 mg Oral Daily    sodium chloride flush  5-40 mL Intravenous 2 times per day    enoxaparin  40 mg Subcutaneous QPM      Infusions:   sodium chloride 100 mL/hr at 05/06/21 0652    sodium chloride        PRN Meds:  sodium chloride flush, sodium chloride, promethazine **OR** ondansetron, acetaminophen **OR** acetaminophen, polyethylene glycol, nitroGLYCERIN, cyclobenzaprine       Physical Exam:  Vitals:    05/06/21 1023   BP: (!) 94/56   Pulse: 58   Resp: 21   Temp:    SpO2: 99%        General: AAO, NAD  Chest: Nontender  Cardiac: First and Second Heart Sounds are Normal, No Murmurs or Gallops noted  Lungs:Clear to auscultation and percussion. Abdomen: Soft, NT, ND, +BS  Extremities: No clubbing, no edema  Vascular:  Equal 2+ peripheral pulses. Lab Data:  CBC:   Recent Labs     05/05/21  1245 05/06/21  0403   WBC 4.6 4.3   HGB 14.0 13.7   HCT 42.2 41.4*   MCV 93.0 92.6    165     BMP:   Recent Labs     05/05/21  1245 05/06/21  0403    137   K 4.0 4.4    104   CO2 27 26   BUN 16 16   CREATININE 1.1 1.1     LIVER PROFILE:   Recent Labs     05/05/21  1245 05/06/21  0403   AST 26 24   ALT 24 26   BILITOT 0.9 0.8   ALKPHOS 54 51     PT/INR:   Recent Labs     05/05/21  1256   PROTIME 13.9   INR 1.15     APTT:   Recent Labs     05/05/21  1256   APTT 25.1     BNP:  No results for input(s): BNP in the last 72 hours.       Assessment:  Patient Active Problem List    Diagnosis Date Noted    Chest pain 05/05/2021    CAD in native artery 03/05/2019    Angina pectoris ([facility] [address] 03/04/2019    Idiopathic parathyroidism ([facility]     Hyperparathyroidism ([address]     Chest pain at rest 01/12/2014    DVT of upper extremity (deep vein thrombosis) ([address] 01/12/2014    Prostate cancer ([facility] [address] 01/12/2014    Neck pain on left side 01/12/2014    Hypertension 01/12/2014    Hyperlipemia 01/12/2014       Electronically signed by Tessie Paget, PA-C on 5/6/2021 at 10:58 AM  Electronically signed by Sarahy Greene MD on 5/6/21 at 12:28 PM EDT

## 2021-05-06 NOTE — PROGRESS NOTES
Hospitalist Progress Note      Name:  Isaias Esparza /Age/Sex: 1945  (77 y.o. male)   MRN & CSN:  [id number] & [id number] Admission Date/Time: 2021 12:44 PM   Location:  [address] [place] [address] PCP: Nguyen Cleveland MD         Hospital Day: 2    Assessment and Plan:   Isaias Esparza is a 77 y.o.  male  who presents with      #Chest pain r/o ACS  #Hx of CAD and PCI  -EKG; first-degree AV block new onset. Nonspecific T wave changes  -ECHO:, EF 50 to 55%. -aspirin, lisinopril, Plavix and statin.  -Nitro as needed for pain  -Cardiology on board: for stress test     #back pain and neck pain: Lumbar and cervical radiculopathy  -Patient reports severe pain from his lower back to his left lower extremity. Similar pain from his neck to the left arm.  -X-ray C-spine and L-spine  -Tylenol as needed for pain  -Flexeril  -Consider gabapentin. #diarrhea  -had 3 watery BMs today  -c.diff pcr sent  -monitor        Other chronic medical conditions: Resume home meds except for contraindicated  -HTN-hold Coreg due to first-degree heart block  -HLD  -BPH      Diet DIET GENERAL; No Caffeine   DVT Prophylaxis [] Lovenox, []  Heparin, [] SCDs, [] Ambulation   GI Prophylaxis [] PPI,  [] H2 Blocker,  [] Carafate,  [] Diet/Tube Feeds   Code Status Full Code   Disposition Patient requires continued admission due to Chest pain         History of Present Illness:     Chief Complaint: <principal problem not specified>  Isaias Esparza is a 77 y.o.  male  who presents with chest pain    Has had 3 watery BMs today. Ten point ROS reviewed negative, unless as noted above    Objective: Intake/Output Summary (Last 24 hours) at 2021 1308  Last data filed at 2021 0221  Gross per 24 hour   Intake --   Output 675 ml   Net -675 ml      Vitals:   Vitals:    21 1103   BP: (!) 94/57   Pulse: 61   Resp: (!) 32   Temp:    SpO2:      Physical Exam:   GEN Awake male, sitting upright in bed in no apparent distress.  Appears given age.  Etha Collie are equally round. No scleral erythema, discharge, or conjunctivitis. HENT Mucous membranes are moist. Oral pharynx without exudates, no evidence of thrush. NECK Supple, no apparent thyromegaly or masses. RESP Clear to auscultation, no wheezes, rales or rhonchi. Symmetric chest movement while on room air. CARDIO/VASC S1/S2 auscultated. Regular rate without appreciable murmurs, rubs, or gallops. No JVD or carotid bruits. Peripheral pulses equal bilaterally and palpable. No peripheral edema. GI Abdomen is soft without significant tenderness, masses, or guarding. Bowel sounds are normoactive.  No costovertebral angle tenderness. HEME/LYMPH No palpable cervical lymphadenopathy and no hepatosplenomegaly. No petechiae or ecchymoses. MSK No gross joint deformities. SKIN Normal coloration, warm, dry. NEURO Cranial nerves appear grossly intact, normal speech, no lateralizing weakness. PSYCH Awake, alert, oriented x 4. Affect appropriate.     Medications:   Medications:    atorvastatin  20 mg Oral Nightly    tamsulosin  0.4 mg Oral Nightly    midodrine  5 mg Oral TID WC    aspirin  81 mg Oral Daily    sodium chloride flush  5-40 mL Intravenous 2 times per day    enoxaparin  40 mg Subcutaneous QPM      Infusions:    sodium chloride 100 mL/hr at 05/06/21 0652    sodium chloride       PRN Meds: sodium chloride flush, 5-40 mL, PRN  sodium chloride, 25 mL, PRN  promethazine, 12.5 mg, Q6H PRN    Or  ondansetron, 4 mg, Q6H PRN  acetaminophen, 650 mg, Q6H PRN    Or  acetaminophen, 650 mg, Q6H PRN  polyethylene glycol, 17 g, Daily PRN  nitroGLYCERIN, 0.4 mg, Q5 Min PRN  cyclobenzaprine, 10 mg, TID PRN          Electronically signed by Jojo Alonzo MD on 5/6/2021 at 1:08 PM

## 2021-05-06 NOTE — PROGRESS NOTES
LOKESH Yu:   5/6/21 6:38 PM   [phone] Hospital or Facility: [address] From: [place] RE: Beth Doty RM: [id number] Pt mentioned that he normally takes Plavix- he has no orders- do you want to restart?  I gave him asa this am and he is getting lovenox right now Need Callback: NO CALLBACK REQ  Read 6:39 PM   Response:   5/6/21 6:41 PM   I will clarify with mayo tomorrow

## 2021-05-07 VITALS
RESPIRATION RATE: 28 BRPM | SYSTOLIC BLOOD PRESSURE: 140 MMHG | BODY MASS INDEX: 27.22 KG/M2 | HEIGHT: 73 IN | WEIGHT: 205.38 LBS | OXYGEN SATURATION: 97 % | TEMPERATURE: 97.8 F | HEART RATE: 52 BPM | DIASTOLIC BLOOD PRESSURE: 79 MMHG

## 2021-05-07 PROCEDURE — 6370000000 HC RX 637 (ALT 250 FOR IP): Performed by: INTERNAL MEDICINE

## 2021-05-07 PROCEDURE — 2580000003 HC RX 258: Performed by: INTERNAL MEDICINE

## 2021-05-07 PROCEDURE — G0378 HOSPITAL OBSERVATION PER HR: HCPCS

## 2021-05-07 RX ORDER — CYCLOBENZAPRINE HCL 10 MG
10 TABLET ORAL 3 TIMES DAILY PRN
Qty: 30 TABLET | Refills: 0 | Status: SHIPPED | OUTPATIENT
Start: 2021-05-07 | End: 2021-05-17

## 2021-05-07 RX ADMIN — SODIUM CHLORIDE: 9 INJECTION, SOLUTION INTRAVENOUS at 05:16

## 2021-05-07 RX ADMIN — ASPIRIN 81 MG CHEWABLE TABLET 81 MG: 81 TABLET CHEWABLE at 09:47

## 2021-05-07 ASSESSMENT — PAIN SCALES - GENERAL: PAINLEVEL_OUTOF10: 0

## 2021-05-07 NOTE — PROGRESS NOTES
Daily Progress Note    Feeling better  Heart rate and BP stable  Off all BP meds  Ok for home from cardiac stand --Off meds  Hx of CAD and PCI keep on ASa  holter pending     Pt. Awake, alert and feeling better  HR stable, NSR at 65, BP stable  No CP, SOB currently     Chest pain    Hx of CAD s/p PCI in 2019    Trop neg. CT chest neg. Echo reassuring    Stress neg.     He had a near syncopal episode with HOTN during the stress  Now off his BP meds-would watch for now as he has been stable  Increase activity-ok to D/C when ok with primary team  F/u at office next week for BP assessment    5/6/21  Summary    No EKG changes suggestive of ischemia with Lexiscan infusion.    Normal perfusion uptake noted in the anterior/septal and lateral wall    fixed defect in the inferior wall due to diaphragmatic artifact    gating shows EF 67%    patient had hypotension and heart rate remained in 50's      Echo-5/5/21  Summary   Left ventricular systolic function is normal.   Ejection fraction is visually estimated at 60%. Mild left ventricular hypertrophy. Mild to moderate mitral regurgitation is present. Moderate pulmonic regurgitation present. No evidence of any pericardial effusion.     PAST MEDICAL HISTORY:  History of coronary disease status post  angioplasty done in 09/2019 of the LAD.  History of hypertension,  hyperlipidemia and arthritis present.  Parathyroid adenoma present. History of prostate cancer present.     PAST SURGICAL HISTORY:  Angioplasty of the LAD in 03/2019 and prostate  biopsy and vasectomy done.     SOCIAL HISTORY:  The patient does not smoke, does not drink.     ALLERGIES:  SULFA.     MEDICATIONS:  He is on lisinopril 10 mg a day, aspirin 81 mg a day,  Lipitor 80 mg a day, Coreg 12.5 b.i.d. and I think he is taking 6.25  b.i.d., Plavix, and Flomax.       Objective:   BP (!) 140/79   Pulse 52   Temp 97.8 °F (36.6 °C) (Oral)   Resp 28   Ht 6' 1\" (1.854 m)   Wt 205 lb 6 oz (93.2 kg) SpO2 97%   BMI 27.10 kg/m²       Intake/Output Summary (Last 24 hours) at 5/7/2021 1030  Last data filed at 5/7/2021 0949  Gross per 24 hour   Intake 480 ml   Output --   Net 480 ml       Medications:   Scheduled Meds:   atorvastatin  20 mg Oral Nightly    tamsulosin  0.4 mg Oral Nightly    midodrine  5 mg Oral TID WC    aspirin  81 mg Oral Daily    sodium chloride flush  5-40 mL Intravenous 2 times per day    enoxaparin  40 mg Subcutaneous QPM      Infusions:   sodium chloride        PRN Meds:  sodium chloride flush, sodium chloride, promethazine **OR** ondansetron, acetaminophen **OR** acetaminophen, polyethylene glycol, nitroGLYCERIN, cyclobenzaprine       Physical Exam:  Vitals:    05/07/21 0933   BP: (!) 140/79   Pulse: 52   Resp: 28   Temp: 97.8 °F (36.6 °C)   SpO2: 97%        General: AAO, NAD  Chest: Nontender  Cardiac: First and Second Heart Sounds are Normal, No Murmurs or Gallops noted  Lungs:Clear to auscultation and percussion. Abdomen: Soft, NT, ND, +BS  Extremities: No clubbing, no edema  Vascular:  Equal 2+ peripheral pulses. Lab Data:  CBC:   Recent Labs     05/05/21  1245 05/06/21  0403   WBC 4.6 4.3   HGB 14.0 13.7   HCT 42.2 41.4*   MCV 93.0 92.6    165     BMP:   Recent Labs     05/05/21  1245 05/06/21  0403    137   K 4.0 4.4    104   CO2 27 26   BUN 16 16   CREATININE 1.1 1.1     LIVER PROFILE:   Recent Labs     05/05/21  1245 05/06/21  0403   AST 26 24   ALT 24 26   BILITOT 0.9 0.8   ALKPHOS 54 51     PT/INR:   Recent Labs     05/05/21  1256   PROTIME 13.9   INR 1.15     APTT:   Recent Labs     05/05/21  1256   APTT 25.1     BNP:  No results for input(s): BNP in the last 72 hours.       Assessment:  Patient Active Problem List    Diagnosis Date Noted    Chest pain 05/05/2021    CAD in native artery 03/05/2019    Angina pectoris ([facility] [address] 03/04/2019    Idiopathic parathyroidism ([facility] [address]     Hyperparathyroidism ([facility] [address]     Chest pain at rest 01/12/2014    DVT of upper extremity (deep vein thrombosis) ([facility] [address] 01/12/2014    Prostate cancer ([facility] [address] 01/12/2014    Neck pain on left side 01/12/2014    Hypertension 01/12/2014    Hyperlipemia 01/12/2014       Electronically signed by Linnea Perdomo PA-C on 5/7/2021 at 10:30 AM    Electronically signed by Chuyita Hahn MD on 5/7/21 at 10:42 AM EDT

## 2021-05-07 NOTE — PROGRESS NOTES
PS: Dr. Shabnam Martinez:   5/7/21 9:27 AM   [phone] Hospital or Facility: [address] From: [place] RE: Monty Galvan RM: [id number] just spoke w/ Dr. Mccracken Goods- he said okay to restart Plavix, also- he said to stop fluids, and have him walk, and he is good for d/c from his standpoint Need Callback: NO CALLBACK REQ  Read 9:35 AM     5/7/21 9:28 AM   Pt also mentioned that he had a soft stool this am  Read 9:35 AM     5/7/21 9:29 AM   and do you want any labs on him before he goes?   Read 9:35 AM   Response;   5/7/21 9:39 AM   Will be there shortly     10:00am- drVanita At bedside- Dr. Arron Opitz for pt d/c

## 2021-05-07 NOTE — DISCHARGE SUMMARY
cyclobenzaprine (FLEXERIL) 10 MG tablet  Take 1 tablet by mouth 3 times daily as needed for Muscle spasms             Mineral Oil OIL  Take by mouth daily Over The Counter             nitroGLYCERIN (NITROSTAT) 0.4 MG SL tablet  Place 1 tablet under tongue upon chest pain, wait 5 minutes and may repeat up to 3 doses in 15 minutes. Do not crush or break. tamsulosin (FLOMAX) 0.4 MG capsule  Take 0.8 mg by mouth nightly                  Objective Findings at Discharge:   BP (!) 140/79   Pulse 52   Temp 97.8 °F (36.6 °C) (Oral)   Resp 28   Ht 6' 1\" (1.854 m)   Wt 205 lb 6 oz (93.2 kg)   SpO2 97%   BMI 27.10 kg/m²            PHYSICAL EXAM   GEN Awake male, sitting upright in bed in no apparent distress. Appears given age. EYES Pupils are equally round. No scleral erythema, discharge, or conjunctivitis. HENT Mucous membranes are moist. Oral pharynx without exudates, no evidence of thrush. NECK Supple, no apparent thyromegaly or masses. RESP Clear to auscultation, no wheezes, rales or rhonchi. Symmetric chest movement while on room air. CARDIO/VASC S1/S2 auscultated. Regular rate without appreciable murmurs, rubs, or gallops. No JVD or carotid bruits. Peripheral pulses equal bilaterally and palpable. No peripheral edema. GI Abdomen is soft without significant tenderness, masses, or guarding. Bowel sounds are normoactive. Rectal exam deferred.  No costovertebral angle tenderness. Normal appearing external genitalia. Lerma catheter is not present. HEME/LYMPH No palpable cervical lymphadenopathy and no hepatosplenomegaly. No petechiae or ecchymoses. MSK No gross joint deformities. SKIN Normal coloration, warm, dry. NEURO Cranial nerves appear grossly intact, normal speech, no lateralizing weakness. PSYCH Awake, alert, oriented x 4. Affect appropriate.     BMP/CBC  Recent Labs     05/05/21  1245 05/06/21  0403    137   K 4.0 4.4    104   CO2 27 26   BUN 16 16   CREATININE 1.1

## 2021-05-07 NOTE — PLAN OF CARE
Problem: Diarrhea:  Goal: Bowel elimination is within specified parameters  Description: Bowel elimination is within specified parameters  Outcome: Ongoing     Problem: Diarrhea:  Goal: Passage of soft, formed stool  Description: Passage of soft, formed stool  Outcome: Ongoing     Problem: Diarrhea:  Goal: Establishment of normal bowel function will improve to within specified parameters  Description: Establishment of normal bowel function will improve to within specified parameters  Outcome: Ongoing     Problem: Cardiac Output - Decreased:  Goal: Avoidance of environmental tobacco smoke  Description: Absence of orthostatic hypotension  Outcome: Ongoing     Problem: Cardiac Output - Decreased:  Goal: Cardiac output within specified parameters  Description: Cardiac output within specified parameters  Outcome: Ongoing     Problem: Fluid Volume - Imbalance:  Goal: Absence of imbalanced fluid volume signs and symptoms  Description: Absence of imbalanced fluid volume signs and symptoms  Outcome: Ongoing

## 2021-05-07 NOTE — PLAN OF CARE
Problem: Pain:  Goal: Pain level will decrease  Description: Pain level will decrease  5/7/2021 0941 by Tariq Alexandra RN  Outcome: Ongoing     Problem: Pain:  Goal: Control of acute pain  Description: Control of acute pain  5/7/2021 0941 by Tariq Alexandra RN  Outcome: Ongoing     Problem: Pain:  Goal: Control of chronic pain  Description: Control of chronic pain  5/7/2021 0941 by Tariq Alexandra RN  Outcome: Ongoing

## 2021-07-19 ENCOUNTER — HOSPITAL ENCOUNTER (EMERGENCY)
Age: 76
Discharge: LEFT BEFORE DISCHARGE | End: 2021-07-19
Payer: MEDICARE

## 2021-07-19 ASSESSMENT — PAIN SCALES - GENERAL: PAINLEVEL_OUTOF10: 0

## 2021-08-17 ENCOUNTER — HOSPITAL ENCOUNTER (OUTPATIENT)
Dept: CARDIAC CATH/INVASIVE PROCEDURES | Age: 76
Setting detail: OUTPATIENT SURGERY
Discharge: HOME | End: 2021-08-17
Attending: INTERNAL MEDICINE | Admitting: INTERNAL MEDICINE
Payer: MEDICARE

## 2021-08-17 VITALS
DIASTOLIC BLOOD PRESSURE: 83 MMHG | OXYGEN SATURATION: 100 % | HEART RATE: 57 BPM | TEMPERATURE: 96.7 F | WEIGHT: 210 LBS | SYSTOLIC BLOOD PRESSURE: 110 MMHG | BODY MASS INDEX: 27.83 KG/M2 | HEIGHT: 73 IN | RESPIRATION RATE: 17 BRPM

## 2021-08-17 LAB
ANION GAP SERPL CALCULATED.3IONS-SCNC: 5 MMOL/L (ref 4–16)
APTT: 23.1 SECONDS (ref 25.1–37.1)
BUN BLDV-MCNC: 13 MG/DL (ref 6–23)
CALCIUM SERPL-MCNC: 9.1 MG/DL (ref 8.3–10.6)
CHLORIDE BLD-SCNC: 98 MMOL/L (ref 99–110)
CO2: 27 MMOL/L (ref 21–32)
CREAT SERPL-MCNC: 1 MG/DL (ref 0.9–1.3)
GFR AFRICAN AMERICAN: >60 ML/MIN/1.73M2
GFR NON-AFRICAN AMERICAN: >60 ML/MIN/1.73M2
GLUCOSE BLD-MCNC: 91 MG/DL (ref 70–99)
HCT VFR BLD CALC: 44.3 % (ref 42–52)
HEMOGLOBIN: 14 GM/DL (ref 13.5–18)
INR BLD: 1.11 INDEX
MCH RBC QN AUTO: 29.7 PG (ref 27–31)
MCHC RBC AUTO-ENTMCNC: 31.6 % (ref 32–36)
MCV RBC AUTO: 93.9 FL (ref 78–100)
PDW BLD-RTO: 13 % (ref 11.7–14.9)
PLATELET # BLD: 174 K/CU MM (ref 140–440)
PMV BLD AUTO: 10.9 FL (ref 7.5–11.1)
POTASSIUM SERPL-SCNC: 4.2 MMOL/L (ref 3.5–5.1)
PROTHROMBIN TIME: 14.3 SECONDS (ref 11.7–14.5)
RBC # BLD: 4.72 M/CU MM (ref 4.6–6.2)
SODIUM BLD-SCNC: 130 MMOL/L (ref 135–145)
WBC # BLD: 4.4 K/CU MM (ref 4–10.5)

## 2021-08-17 PROCEDURE — C1769 GUIDE WIRE: HCPCS

## 2021-08-17 PROCEDURE — 85027 COMPLETE CBC AUTOMATED: CPT

## 2021-08-17 PROCEDURE — 2580000003 HC RX 258: Performed by: INTERNAL MEDICINE

## 2021-08-17 PROCEDURE — 6360000002 HC RX W HCPCS

## 2021-08-17 PROCEDURE — C1887 CATHETER, GUIDING: HCPCS

## 2021-08-17 PROCEDURE — 2709999900 HC NON-CHARGEABLE SUPPLY

## 2021-08-17 PROCEDURE — C1894 INTRO/SHEATH, NON-LASER: HCPCS

## 2021-08-17 PROCEDURE — 6370000000 HC RX 637 (ALT 250 FOR IP): Performed by: INTERNAL MEDICINE

## 2021-08-17 PROCEDURE — 6360000004 HC RX CONTRAST MEDICATION

## 2021-08-17 PROCEDURE — 80048 BASIC METABOLIC PNL TOTAL CA: CPT

## 2021-08-17 PROCEDURE — 93458 L HRT ARTERY/VENTRICLE ANGIO: CPT

## 2021-08-17 PROCEDURE — 85730 THROMBOPLASTIN TIME PARTIAL: CPT

## 2021-08-17 PROCEDURE — 85610 PROTHROMBIN TIME: CPT

## 2021-08-17 RX ORDER — SODIUM CHLORIDE 0.9 % (FLUSH) 0.9 %
5-40 SYRINGE (ML) INJECTION PRN
Status: DISCONTINUED | OUTPATIENT
Start: 2021-08-17 | End: 2021-08-17 | Stop reason: HOSPADM

## 2021-08-17 RX ORDER — SODIUM CHLORIDE 9 MG/ML
INJECTION, SOLUTION INTRAVENOUS CONTINUOUS
Status: DISCONTINUED | OUTPATIENT
Start: 2021-08-17 | End: 2021-08-17 | Stop reason: HOSPADM

## 2021-08-17 RX ORDER — SODIUM CHLORIDE 0.9 % (FLUSH) 0.9 %
5-40 SYRINGE (ML) INJECTION EVERY 12 HOURS SCHEDULED
Status: DISCONTINUED | OUTPATIENT
Start: 2021-08-17 | End: 2021-08-17 | Stop reason: HOSPADM

## 2021-08-17 RX ORDER — CARVEDILOL 3.12 MG/1
3.12 TABLET ORAL 2 TIMES DAILY WITH MEALS
Status: ON HOLD | COMMUNITY
End: 2021-11-10 | Stop reason: HOSPADM

## 2021-08-17 RX ORDER — SODIUM CHLORIDE 9 MG/ML
25 INJECTION, SOLUTION INTRAVENOUS PRN
Status: DISCONTINUED | OUTPATIENT
Start: 2021-08-17 | End: 2021-08-17 | Stop reason: HOSPADM

## 2021-08-17 RX ORDER — DIAZEPAM 5 MG/1
5 TABLET ORAL ONCE
Status: COMPLETED | OUTPATIENT
Start: 2021-08-17 | End: 2021-08-17

## 2021-08-17 RX ORDER — MORPHINE SULFATE 2 MG/ML
1 INJECTION, SOLUTION INTRAMUSCULAR; INTRAVENOUS
Status: DISCONTINUED | OUTPATIENT
Start: 2021-08-17 | End: 2021-08-17 | Stop reason: HOSPADM

## 2021-08-17 RX ORDER — ACETAMINOPHEN 325 MG/1
650 TABLET ORAL EVERY 4 HOURS PRN
Status: DISCONTINUED | OUTPATIENT
Start: 2021-08-17 | End: 2021-08-17 | Stop reason: HOSPADM

## 2021-08-17 RX ORDER — ATROPINE SULFATE 0.4 MG/ML
0.5 AMPUL (ML) INJECTION
Status: DISCONTINUED | OUTPATIENT
Start: 2021-08-17 | End: 2021-08-17 | Stop reason: HOSPADM

## 2021-08-17 RX ORDER — DIPHENHYDRAMINE HCL 25 MG
25 TABLET ORAL ONCE
Status: COMPLETED | OUTPATIENT
Start: 2021-08-17 | End: 2021-08-17

## 2021-08-17 RX ADMIN — DIPHENHYDRAMINE HYDROCHLORIDE 25 MG: 25 TABLET ORAL at 08:59

## 2021-08-17 RX ADMIN — DIAZEPAM 5 MG: 5 TABLET ORAL at 08:59

## 2021-08-17 RX ADMIN — SODIUM CHLORIDE: 9 INJECTION, SOLUTION INTRAVENOUS at 08:59

## 2021-08-17 NOTE — PROGRESS NOTES
1000- Patient returned to Cath Holding with TR band to right wrist with 12ml air in TR band. No bleeding nor hematoma noted. Vitals stable. Call light within reach.

## 2021-08-17 NOTE — PROCEDURES
[address] [place] [address], [address] [place] [address]                            CARDIAC CATHETERIZATION    PATIENT NAME: Maria G Finley                       :        1945  MED REC NO:   [id number]                          ROOM:  ACCOUNT NO:   [de-identified]                           ADMIT DATE: 2021  PROVIDER:     Kacey Pedraza MD    DATE OF PROCEDURE:  2021    INDICATION FOR PROCEDURE:  Chest pain and coronary artery disease. This is a 60-year-old male patient brought to cath lab today. Informed  consent was obtained from the patient. The patient was prepped and  draped in usual sterile fashion. The patient was injected with 5 mL of  2% lidocaine in the right radial region. Using a radial needle, right  radial artery was cannulized and a 5/6-Omani sheath was placed in the  right radial artery. Entire procedure was done using a guidewire,  sheath was flushed in between the procedure. Next, using a Poornima catheter, left coronary angiography was performed. Left coronary angiogram revealed the left main is short, it is patent. It trifurcates into LAD, circ, and ramus branch. LAD is a medium-sized  vessel, reaches and wraps the apex. The mid stent of LAD is widely  patent. DONNA-3 flow is noted. Gives off a small diagonal branch, which  is patent. It gives off a medium-sized ramus branch. It is a branching  vessel. It is patent. Circ is a large-sized vessel, it is a dominant vessel. Gives off the  OM1, OM2, PD and PL branch. The circ has mild disease present. DONNA-3  flow is present. Right coronary artery is a nondominant vessel. Right coronary artery is  patent. Using a pigtail catheter, EDP was measured. EDP is around 10 mmHg  present. On the pullback, there is no gradient present across the  aortic valve. The left main is patent. LAD mid-stent is patent. Circ is patent and is a dominant vessel. Ramus is patent. RCA is  patent, nondominant vessel. EDP is normal.    The patient has mild coronary artery disease noted with a patent mid-LAD  stent present. Continue medical treatment. The patient had a CT of the chest done in 05/2021, and no PE was noted.         Kayla Moe MD    D: 08/17/2021 9:59:01       T: 08/17/2021 10:02:52     [email or web address]  Job#: [id number]     Doc#: [id number]    CC:

## 2021-08-17 NOTE — OP NOTE
Operative Note      Patient: Leighann Castro  YOB: 1945  MRN: [id number]    Date of Procedure: 8/17/21    Pre-Op Diagnosis: chest pain   Post-Op Diagnosis: Same       Estimated Blood Loss (mL): less than 50     Complications: None    Electronically signed by Ling Barnett MD on 8/17/2021 at 9:52 AM      DICTATED --[id number]  LEFT MAIN PATENT  LAD MID STENT PATENT  RAMUS MILD DX  LCX DOMINANT -PATENT  RCA=-ND PATENT  LVEDP 10  MEDICAL TREATMENT   HOME LATER TODAY  RIGHT RADIAL ACCESS    HE IS ON COREG 3.125 BID AT HOME KEEP THAT  KEEP ON ASA/PLAVIX AND STATIN

## 2021-08-17 NOTE — PROGRESS NOTES
Discharge instructions reviewed with patient. Voices understanding. Ambulated without difficulty. Iv removed. Patient discharging home with family.  No bleeding nor hematoma noted to right wrist.

## 2021-08-17 NOTE — H&P
[address] [place] [address], [address] [facility]                              HISTORY AND PHYSICAL    PATIENT NAME: Yanira Deaparker                       :        1945  MED REC NO:   [id number]                          ROOM:  ACCOUNT NO:   [de-identified]                           ADMIT DATE: 2021  PROVIDER:     Elgin Baker MD    INDICATION:  Chest pain. HISTORY OF PRESENT ILLNESS:  This is a 42-year-old male patient, patient  of Dr. Fide Rangel. History of coronary artery disease, status post  angioplasty done in 2019 of the LAD. The patient has been having chest  pain present with exertion and pain radiating to the arm and to the jaw,  neck and shoulder blades. No fever, no chills. No cough or sputum  production. No other  or GI complaints. PAST MEDICAL HISTORY:  History of coronary artery disease status post  angioplasty done, hypertension, hyperlipidemia, history of prostate  cancer and follows with Urology and had left arm DVT in the remote past.    PAST SURGICAL HISTORY:  Vasectomy, prostate biopsy done and angioplasty. SOCIAL HISTORY:  Does not smoke, does not drink. ALLERGIES:  SULFA. MEDICATIONS:  He is on Plavix, aspirin, Coreg, Flomax, Lipitor. FAMILY HISTORY:  Significant for hypertension. PHYSICAL EXAMINATION:  GENERAL:  The patient is awake, alert, and answering questions, not in  acute distress. VITAL SIGNS:  Temperature is afebrile, pulse is 63, blood pressure  110/85. HEENT:  Head is normocephalic and atraumatic. Pupils are equal and  reactive to light. CHEST:  Equal expansion. LUNGS:  Clear to auscultation. No wheezing or rhonchi. HEART:  Regular rate and rhythm. ABDOMEN:  Soft and nontender. Bowel sounds are present. No  hepatosplenomegaly or guarding appreciated. EXTREMITIES:  No cyanosis or clubbing noted. NEUROLOGIC:  Cranial nerves II through XII grossly intact.     LABORATORY DATA:  BUN is 13, creatinine 1.0.  CBC is normal.    IMPRESSION:  This is a 80-year-old male patient with a history of having  coronary artery disease status post angioplasty done, now having  recurrent symptoms of chest pain present. Pain with exertion present. PLAN:  So the plan is for heart cath and we will make further  recommendation based on that.         Carla Graves MD    D: 08/17/2021 8:45:53       T: 08/17/2021 9:19:25     [email or web address]  Job#: [id number]     Doc#: [id number]    CC:

## 2021-10-29 ENCOUNTER — HOSPITAL ENCOUNTER (INPATIENT)
Age: 76
LOS: 8 days | Discharge: HOME HEALTH | DRG: 091 | End: 2021-11-06
Attending: PHYSICAL MEDICINE & REHABILITATION | Admitting: PHYSICAL MEDICINE & REHABILITATION
Payer: MEDICARE

## 2021-10-29 PROBLEM — G72.81 CRITICAL ILLNESS MYOPATHY: Status: ACTIVE | Noted: 2021-10-29

## 2021-10-29 PROCEDURE — 6370000000 HC RX 637 (ALT 250 FOR IP): Performed by: PHYSICAL MEDICINE & REHABILITATION

## 2021-10-29 PROCEDURE — 6360000002 HC RX W HCPCS: Performed by: PHYSICAL MEDICINE & REHABILITATION

## 2021-10-29 PROCEDURE — 99223 1ST HOSP IP/OBS HIGH 75: CPT | Performed by: PHYSICAL MEDICINE & REHABILITATION

## 2021-10-29 PROCEDURE — 94761 N-INVAS EAR/PLS OXIMETRY MLT: CPT

## 2021-10-29 PROCEDURE — 1280000000 HC REHAB R&B

## 2021-10-29 PROCEDURE — 94664 DEMO&/EVAL PT USE INHALER: CPT

## 2021-10-29 PROCEDURE — 94150 VITAL CAPACITY TEST: CPT

## 2021-10-29 RX ORDER — OXYBUTYNIN CHLORIDE 5 MG/1
5 TABLET ORAL 3 TIMES DAILY PRN
Status: DISCONTINUED | OUTPATIENT
Start: 2021-10-29 | End: 2021-11-06 | Stop reason: HOSPADM

## 2021-10-29 RX ORDER — CYCLOBENZAPRINE HCL 10 MG
5 TABLET ORAL 3 TIMES DAILY PRN
Status: DISCONTINUED | OUTPATIENT
Start: 2021-10-29 | End: 2021-11-06 | Stop reason: HOSPADM

## 2021-10-29 RX ORDER — ATORVASTATIN CALCIUM 40 MG/1
80 TABLET, FILM COATED ORAL NIGHTLY
Status: DISCONTINUED | OUTPATIENT
Start: 2021-10-29 | End: 2021-11-06 | Stop reason: HOSPADM

## 2021-10-29 RX ORDER — CLOPIDOGREL BISULFATE 75 MG/1
75 TABLET ORAL DAILY
Status: DISCONTINUED | OUTPATIENT
Start: 2021-10-29 | End: 2021-10-30

## 2021-10-29 RX ORDER — TAMSULOSIN HYDROCHLORIDE 0.4 MG/1
0.4 CAPSULE ORAL DAILY
Status: DISCONTINUED | OUTPATIENT
Start: 2021-10-29 | End: 2021-10-30

## 2021-10-29 RX ORDER — POLYETHYLENE GLYCOL 3350 17 G/17G
17 POWDER, FOR SOLUTION ORAL DAILY PRN
Status: DISCONTINUED | OUTPATIENT
Start: 2021-10-29 | End: 2021-11-06 | Stop reason: HOSPADM

## 2021-10-29 RX ORDER — CARVEDILOL 6.25 MG/1
3.12 TABLET ORAL 2 TIMES DAILY WITH MEALS
Status: DISCONTINUED | OUTPATIENT
Start: 2021-10-29 | End: 2021-11-06 | Stop reason: HOSPADM

## 2021-10-29 RX ORDER — OXYCODONE HYDROCHLORIDE 5 MG/1
5 TABLET ORAL EVERY 6 HOURS PRN
Status: DISCONTINUED | OUTPATIENT
Start: 2021-10-29 | End: 2021-11-06 | Stop reason: HOSPADM

## 2021-10-29 RX ORDER — ASPIRIN 81 MG/1
81 TABLET, CHEWABLE ORAL DAILY
Status: DISCONTINUED | OUTPATIENT
Start: 2021-10-29 | End: 2021-10-30

## 2021-10-29 RX ORDER — ACETAMINOPHEN 325 MG/1
650 TABLET ORAL EVERY 4 HOURS PRN
Status: DISCONTINUED | OUTPATIENT
Start: 2021-10-29 | End: 2021-11-06 | Stop reason: HOSPADM

## 2021-10-29 RX ADMIN — CLOPIDOGREL BISULFATE 75 MG: 75 TABLET ORAL at 22:23

## 2021-10-29 RX ADMIN — TAMSULOSIN HYDROCHLORIDE 0.4 MG: 0.4 CAPSULE ORAL at 22:23

## 2021-10-29 RX ADMIN — ENOXAPARIN SODIUM 40 MG: 40 INJECTION SUBCUTANEOUS at 22:23

## 2021-10-29 RX ADMIN — CARVEDILOL 3.12 MG: 6.25 TABLET, FILM COATED ORAL at 19:56

## 2021-10-29 RX ADMIN — OXYCODONE 5 MG: 5 TABLET ORAL at 19:56

## 2021-10-29 RX ADMIN — ATORVASTATIN CALCIUM 80 MG: 40 TABLET, FILM COATED ORAL at 19:56

## 2021-10-29 RX ADMIN — ASPIRIN 81 MG: 81 TABLET, CHEWABLE ORAL at 22:23

## 2021-10-29 ASSESSMENT — PAIN SCALES - GENERAL: PAINLEVEL_OUTOF10: 7

## 2021-10-29 NOTE — H&P
Cara De Anda    : 1945  [facility]  MRN: [id number]              History and physical      Admitting diagnosis: Critical illness myopathy ( [address])    Comorbid diagnoses impacting rehabilitation: Generalized weakness, gait disturbance, uncontrolled pain, prostate cancer with radical prostatectomy on 10/11/2021, small bowel perforation with resection on 10/16/2021, paroxysmal atrial fibrillation, acute kidney injury, essential hypertension, diverticulitis, history of DVT    Chief complaint: General fatigue and mild abdominal pain. History of present illness: The patient is a 71-year-old right-hand-dominant male was recently diagnosed with prostate cancer. On 10/11/2021 he presented to [facility] [place] in [facility] for radical prostatectomy using robotic techniques (Dr. John García). The next day his drain was pulled but there was some concern that there was fecal material in the drain content. He was monitored closely over the next several days. He developed increasing abdominal pain, obstipation and abdominal distention. Ultimately he was diagnosed with an ileus and small bowel perforation. On 10/16/2021 he underwent exploratory laparotomy with small bowel resection (Dr. Memo Luis). Multiple drains were in place and he was on IV antibiotics for the next 2 weeks. He was n.p.o. and on TPN for 10 days. His renal function deteriorated for a while but has started to recover with hydration and time. He has developed 4 extremity proximal and distal weakness during this critical illness has been diagnosed with a critical illness myopathy. In the last 48 hours he has been taking in a regular solid diet. He is unable to do his own toileting, transfers and self-care and cannot return directly home. He requires inpatient rehabilitation to address these issues. Review of systems: He has some abdominal discomfort yet. Bowel movements are occurring now. Positive flatus. No emesis.   No dysuria or sense of retention. Sleep has been fair. He has 4 extremity weakness with no significant cramping or tingling. The remainder of their review of systems was negative except as mentioned in the history of present illness. Social History: He reports that he has never smoked. He has never used smokeless tobacco. He reports current alcohol use. He reports that he does not use drugs. Prior (baseline) level of function: Independent with mobility and self-care. Current level of function: Moderate physical assistance needed for mobility and self-care.     Allergies:  Sulfa antibiotics    Past Medical History:   Past Medical History:   Diagnosis Date    Acid reflux     Diverticulitis Last Episode In 2013    Hx of blood clots Dx 2013    \"Left Arm\"    Hyperlipidemia     Hypertension     Parathyroid adenoma     Prostate Cancer Dx 2016    2 Prostate Biopsies Done, Last Done In 2017    Teeth missing     Upper And Lower    Wears glasses     Wears partial dentures     Upper        Past Surgical History:     Past Surgical History:   Procedure Laterality Date    CARDIAC CATHETERIZATION  2015    COLONOSCOPY  Last Done In 2000's    Polyps Removed In Past    DENTAL SURGERY      Teeth Extracted In Past    [address] Right 09/04/2018    PROSTATE BIOPSY  2016, 2017    \"Prostate Cancer Dx In 2016\"    VASECTOMY  1986       Current Medications:     Current Facility-Administered Medications:     [START ON 10/30/2021] aspirin chewable tablet 81 mg, 81 mg, Oral, Daily, MONIKA Gusman MD    atorvastatin (LIPITOR) tablet 80 mg, 80 mg, Oral, Nightly, MONIKA Gusman MD    carvedilol (COREG) tablet 3.125 mg, 3.125 mg, Oral, BID WC, MONIKA Gusman MD    Washburn Sacks ON 10/30/2021] clopidogrel (PLAVIX) tablet 75 mg, 75 mg, Oral, Daily, MONIKA Gusman MD    Washburn Sacks ON 10/30/2021] tamsulosin (FLOMAX) capsule 0.4 mg, 0.4 mg, Oral, Daily, MONIKA Gusman MD  [facility] cyclobenzaprine (FLEXERIL) tablet 5 mg, 5 mg, Oral, TID PRN, MONIKA Tavera MD    [facility]) tablet 5 mg, 5 mg, Oral, TID PRN, MONIKA Tavera MD    oxyCODONE (ROXICODONE) immediate release tablet 5 mg, 5 mg, Oral, Q6H PRN, MONIKA Tavera MD    acetaminophen (TYLENOL) tablet 650 mg, 650 mg, Oral, Q4H PRN, MONIKA Tavera MD    polyethylene glycol [facility]) packet 17 g, 17 g, Oral, Daily PRN, MONIKA Tavera MD    magnesium hydroxide (MILK OF MAGNESIA) 400 MG/5ML suspension 30 mL, 30 mL, Oral, Daily PRN, MONIKA Tavera MD    [START ON 10/30/2021] enoxaparin (LOVENOX) injection 40 mg, 40 mg, SubCUTAneous, Daily, MONIKA Tavera MD    Family History:   Family History   Problem Relation Age of Onset    Stroke Mother     Mental Illness Mother     Obesity Mother        Exam:    There were no vitals taken for this visit. General: Patient was seen recumbent in bed. He was easily awakened from a nap. He was alert and oriented. Answer direct questions. In no distress. HEENT: No signs of trauma to his head or neck. MMM. Neck supple. No adenopathy or JVD. Clear speech. Pulmonary: Shallow respirations without wheezes or rales. Cardiac: Regular rhythm with slightly elevated rate. No premature beats or murmur. Abdomen: Patient's abdomen was soft and minimally distended. Bowel sounds were present throughout. There was no rebound, guarding or masses noted. He had serosanguineous drainage from his right mid epigastric drain sites. His midline surgical incision was well approximated, dry and without bogginess. He states staples were removed this morning. Spinal exam: Moist skin without open areas. Guarded trunk rotation due to his core pain. Upper extremities: Patient was able to bring both hands up to meet mine. He had 4 -/5 strength both at the shoulders and with power . Reflexes were trace and symmetric. Sensation was preserved.   Coordination was fair.    Lower extremities: 1+ edema about the ankles and feet with minimal pitting. His heels are clear but he had padded dressings on the lateral malleolus I. These were not removed as he stated they were preventative only. He had trace lower limb reflexes. 3/5 hip flexion, knee extension ankle dorsiflexion bilaterally. Sitting balance was fair-.  Standing balance was poor. Lab Results   Component Value Date    WBC 4.4 08/17/2021    HGB 14.0 08/17/2021    HCT 44.3 08/17/2021    MCV 93.9 08/17/2021     08/17/2021     Lab Results   Component Value Date    INR 1.11 08/17/2021    INR 1.15 05/05/2021    INR 1.20 10/14/2017    PROTIME 14.3 08/17/2021    PROTIME 13.9 05/05/2021    PROTIME 13.7 (H) 10/14/2017     Lab Results   Component Value Date    CREATININE 1.0 08/17/2021    BUN 13 08/17/2021     (L) 08/17/2021    K 4.2 08/17/2021    CL 98 (L) 08/17/2021    CO2 27 08/17/2021     Lab Results   Component Value Date    ALT 26 05/06/2021    AST 24 05/06/2021    ALKPHOS 51 05/06/2021    BILITOT 0.8 05/06/2021         Impression: 21-year-old male with a history of atrial fibrillation, hypertension and diverticulitis with a DVT of his right upper limb in 2016. He is now status post robotic radical prostatectomy from 67/99/5223 complicated by small bowel perforation that required resection of the small bowel on 10/16/2021. He had TPN for 10 days, antibiotics for 2 weeks and has developed a critical illness myopathy. Strengths for the patient: Independent habits prior to admission, his alertness and his accessible home. Limitations/barriers for the patient: His age, his history of DVT and his risk for wound infection and further bowel complications. Recommendation: Acute inpatient rehabilitation with occupational and physical therapy 180 minutes 5 out of every 7 days. Will address basic and  advancing mobility with self-care instruction and adaptive equipment training.  Caregiver education will be offered. Expected length of stay  prior to a supervised level of function for discharge home with a walker and C OT/PT is 2-1/2 weeks. Additional recommendation:    1. Critical illness myopathy with gait disturbance: The patient requires daily occupational and physical therapy. He requires adaptive equipment training, caregiver education and bowel and bladder retraining. We does monitor his wounds for signs of infection. He needs nutritional support and will be placed on a general diet. We must provide DVT prophylaxis cautious pain management. Must maximize treatment of his atrial fibrillation, hypertension and recent kidney injury. 2. DVT prophylaxis: Lovenox 40 mg subcu daily. I must monitor his hemoglobin and platelet count periodically while on this medication. Weightbearing activities will be pursued daily. GI prophylaxis is available. 3. Uncontrolled pain: Acetaminophen, muscle relaxants and oxycodone as needed. Bowel intervention while on the narcotics. Progressive mobilization. 4. Radical prostatectomy: Patient is on Flomax. We must monitor his urinary output. Urology follow-up as an outpatient. 5. Small bowel perforation with moderate protein calorie malnutrition: Monitoring his drain sites and wound for ongoing healing. Increasing his oral intake. Outpatient follow-up with general surgery. 6. Paroxysmal atrial fibrillation: Coreg for rate control. Daily weights to detect any decompensation of CHF. Vital signs are checked at rest and with activity. 7.   Hypertension: His Coreg also helps manage his systolic blood pressure. Target systolic blood pressure ranges 120-140. Vital signs are checked at rest and with activity. 8. History of DVT: Progressive mobilization. Lovenox for DVT prophylaxis. Monitoring him for signs of thrombosis. I personally performed a history and physical on this patient within 24 hours of admission to the rehab unit.  I have reviewed the preadmission screening and concur with its findings without change. A detailed plan of care will be established by hospital day 4 and I attest the patient is appropriate for inpatient rehabilitation at this time. I have compared the patient's current functional status noted during my history and physical with that of the preadmission screen and I have found no significant differences.

## 2021-10-30 PROCEDURE — 97535 SELF CARE MNGMENT TRAINING: CPT

## 2021-10-30 PROCEDURE — 97166 OT EVAL MOD COMPLEX 45 MIN: CPT

## 2021-10-30 PROCEDURE — 94664 DEMO&/EVAL PT USE INHALER: CPT

## 2021-10-30 PROCEDURE — 94761 N-INVAS EAR/PLS OXIMETRY MLT: CPT

## 2021-10-30 PROCEDURE — 97116 GAIT TRAINING THERAPY: CPT

## 2021-10-30 PROCEDURE — 1280000000 HC REHAB R&B

## 2021-10-30 PROCEDURE — 97162 PT EVAL MOD COMPLEX 30 MIN: CPT

## 2021-10-30 PROCEDURE — 6370000000 HC RX 637 (ALT 250 FOR IP): Performed by: PHYSICAL MEDICINE & REHABILITATION

## 2021-10-30 PROCEDURE — 97530 THERAPEUTIC ACTIVITIES: CPT

## 2021-10-30 PROCEDURE — 94150 VITAL CAPACITY TEST: CPT

## 2021-10-30 RX ORDER — ONDANSETRON 4 MG/1
4 TABLET, ORALLY DISINTEGRATING ORAL 4 TIMES DAILY PRN
Status: DISCONTINUED | OUTPATIENT
Start: 2021-10-30 | End: 2021-11-06 | Stop reason: HOSPADM

## 2021-10-30 RX ORDER — TAMSULOSIN HYDROCHLORIDE 0.4 MG/1
0.4 CAPSULE ORAL DAILY
Status: DISCONTINUED | OUTPATIENT
Start: 2021-10-31 | End: 2021-11-06 | Stop reason: HOSPADM

## 2021-10-30 RX ORDER — CLOPIDOGREL BISULFATE 75 MG/1
75 TABLET ORAL DAILY
Status: DISCONTINUED | OUTPATIENT
Start: 2021-10-31 | End: 2021-11-06 | Stop reason: HOSPADM

## 2021-10-30 RX ORDER — ASPIRIN 81 MG/1
81 TABLET, CHEWABLE ORAL DAILY
Status: DISCONTINUED | OUTPATIENT
Start: 2021-10-31 | End: 2021-11-06 | Stop reason: HOSPADM

## 2021-10-30 RX ADMIN — OXYCODONE 5 MG: 5 TABLET ORAL at 17:51

## 2021-10-30 RX ADMIN — CARVEDILOL 3.12 MG: 6.25 TABLET, FILM COATED ORAL at 09:19

## 2021-10-30 RX ADMIN — ACETAMINOPHEN 650 MG: 325 TABLET ORAL at 09:20

## 2021-10-30 RX ADMIN — ATORVASTATIN CALCIUM 80 MG: 40 TABLET, FILM COATED ORAL at 20:08

## 2021-10-30 RX ADMIN — OXYCODONE 5 MG: 5 TABLET ORAL at 02:37

## 2021-10-30 RX ADMIN — POLYETHYLENE GLYCOL (3350) 17 G: 17 POWDER, FOR SOLUTION ORAL at 15:22

## 2021-10-30 RX ADMIN — CARVEDILOL 3.12 MG: 6.25 TABLET, FILM COATED ORAL at 17:51

## 2021-10-30 ASSESSMENT — PAIN SCALES - GENERAL
PAINLEVEL_OUTOF10: 7
PAINLEVEL_OUTOF10: 3
PAINLEVEL_OUTOF10: 0

## 2021-10-30 NOTE — PROGRESS NOTES
Occupational Therapy                              [facility] OCCUPATIONAL THERAPY EVALUATION    Chart Review:  Past Medical History:   Diagnosis Date    Acid reflux     Diverticulitis Last Episode In 2013    Hx of blood clots Dx 2013    \"Left Arm\"    Hyperlipidemia     Hypertension     Parathyroid adenoma     Prostate Cancer Dx 2016    2 Prostate Biopsies Done, Last Done In 2017    Teeth missing     Upper And Lower    Wears glasses     Wears partial dentures     Upper     Past Surgical History:   Procedure Laterality Date    CARDIAC CATHETERIZATION  2015    COLONOSCOPY  Last Done In 2000's    Polyps Removed In Past    DENTAL SURGERY      Teeth Extracted In Past    [address]    PARATHYROIDECTOMY Right 09/04/2018    PROSTATE BIOPSY  2016, 2017    \"Prostate Cancer Dx In 2016\"    VASECTOMY  1986     Social History:  Social/Functional History  Lives With: Spouse  Type of Home: House  Home Layout: One level, Laundry in basement  Home Access: Stairs to enter without rails  Entrance Stairs - Number of Steps: 3  Bathroom Shower/Tub: Tub/Shower unit  Bathroom Toilet: Standard  Bathroom Accessibility: Walker accessible  Home Equipment: Cane (Cane available, pt was not using AD PTA)  ADL Assistance: Independent  Homemaking Assistance: Independent  Homemaking Responsibilities: Yes  Ambulation Assistance: Independent  Transfer Assistance: Independent  Active : Yes  Mode of Transportation: Truck, Car  Education: Master's degree - political science  Occupation: Full time employment  Type of occupation:   Leisure & Hobbies: Reading, watching TV, working out at [address]: Pt manages own medications with pill box. Additional Comments: Pt sleeps on flat bed at baseline. Pt reports no falls in the past 6 months.     Restrictions:  Restrictions/Precautions  Restrictions/Precautions: General Precautions, Fall Risk  Required Braces or Orthoses?: No Subjective  Subjective: Pt seated in recliner upon arrival, agreeable to OT evaluation. Pain Level: 3       Objective:  Observation/Palpation  Posture: Good  Edema: Abdominal incision    Orientation  Overall Orientation Status: Within Normal Limits        Vision  Vision: Impaired  Vision Exceptions: Wears glasses at all times     Hearing  Hearing: Within functional limits    ROM:      LUE AROM (degrees)  LUE AROM : WNL     Left Hand AROM (degrees)  Left Hand AROM: WNL     RUE AROM (degrees)  RUE AROM : WNL     Right Hand AROM (degrees)  Right Hand AROM: WNL    Strength:    LUE Strength  Gross LUE Strength: WFL  L Hand General: 5/5  RUE Strength  Gross RUE Strength: WFL  R Hand General: 5/5    Quality of Movement: Tone RUE  RUE Tone: Normotonic  Tone LUE  LUE Tone: Normotonic  Coordination  Movements Are Fluid And Coordinated: Yes  Coordination and Movement description: Fine motor impairments       Sensation:    Sensation  Overall Sensation Status: WNL     ADLs:  Eating: Eating  Assistance Needed: Independent  Comment: Open and eat applesauce with Ind.  CARE Score: 6       Oral Hygiene: Oral Hygiene  Assistance Needed: Supervision or touching assistance  Comment: Brush teeth in stance with SBA. CARE Score: 4  Discharge Goal: Independent    UB/LB Bathing: Shower/Bathe Self  Assistance Needed: Supervision or touching assistance  Comment: Bathing seated on shower bench with SBA. CARE Score: 4  Discharge Goal: Independent    UB Dressing: Upper Body Dressing  Assistance Needed: Setup or clean-up assistance  Comment: Don shirt seated with setup. CARE Score: 5  Discharge Goal: Independent         LB Dressing:   Lower Body Dressing  Assistance Needed: Supervision or touching assistance  Comment: Linda Almagure depends and pants sit/stand with SBA, min VCs for technique.   CARE Score: 4  Discharge Goal: Independent    Donning and [place] Footwear: Putting On/Taking Off Footwear  Assistance Needed: Setup or clean-up deficits / Impairments: Decreased functional mobility , Decreased strength, Decreased endurance, Decreased ADL status, Decreased high-level IADLs, Decreased balance, Decreased fine motor control    Patient education:   ARU procotol, Role of O.T., O.T. plan of care  [x]   Patient goal was established and reviewed in Rehab[facility]er with patient and/or family this date. Discharge Recommendations:  Anticipate home with assist PRN  Equipment Recommendations:  TBD pending progress    Goals:  Patient Goals   Patient goals : \"Move around without pain and without assistance. \"  Short term goals  Time Frame for Short term goals: STG=LTG  Long term goals  Time Frame for Long term goals : 5-7 days or until discharge  Long term goal 1: Pt will perform all grooming tasks with Mod I.  Long term goal 2: Pt will perform UB dressing with Mod I.  Long term goal 3: Pt will perform LB dressing with Mod I.  Long term goal 4: Pt will don/doff footwear with Mod I.  Long term goal 5: Pt will perform bathing with Mod I.  Long term goals 6: Pt will perform toileting with Mod I.  Long term goal 7: Pt will perform all fxl transfers (toilet, shower, etc.) with Mod I, DME PRN.   Long term goal 8: Pt will participate in therex/theract with emphasis on dynamic stance >10-12 min to increase endurance and dynamic standing tolerance needed for ADLs/IADLs    Plan:    Pt will be seen at least 60 minutes per day for a minimum of 5 days per week, plus group therapy as appropriate  Current Treatment Recommendations: Strengthening, Functional Mobility Training, Gait Training, Patient/Caregiver Education & Training, Home Management Training, Endurance Training, Stair training, Pain Management, Equipment Evaluation, Education, & procurement, Balance Training, Safety Education & Training, Self-Care / ADL    OT Individual Minutes  Time In: 8738  Time Out: 4337  Minutes: 90                Number of Minutes/Billable Intervention      OT Evaluation 30 Therapeutic Exercise    ADL Self-care 60   Neuro Re-Ed    Therapeutic Activity    Group    Other:    TOTAL 90     Electronically signed by:    MCKINLEY Mooney Ala   10/30/2021, 3:16 PM

## 2021-10-30 NOTE — CONSULTS
Subjective  Agreeable to eval.  Doing well. Was able to be up and walking regularly at previous hospital.  Interested in continued recovery. Asked about nursing rules regarding mobility. Does not want to spend much time in bed. Has some urinary leakage incontinence but does void mostly into toilet, needs more frequent voiding to remain continent. Recovering s/p prostate surg and subsequent small intestine resection. Wishes to go home upon discharge. Wife is healthy. Objective:  Alert, oriented, pleasant, engaged, comfortable. Bandages on bilateral heels, discussed with nurse Jeffery Matute, observed no drainage, RN intends to review skin integrity. Vision  Vision: Impaired  Vision Exceptions: Wears glasses at all times  Hearing  Hearing: Within functional limits    ROM: WFL BUE and BLE and trunk/neck, no gross impairments  Strength:  Sufficient for mobility skills, grossly 4/5 or better for BLEs. Bed Mobility:   Lying to Sitting on Side of Bed  Assistance Needed: Independent  Physical Assistance Level: No physical assistance  Comment: no rail, safe, trial to each EOB  CARE Score: 6  Discharge Goal: Independent  Roll Left and Right  Assistance Needed: Independent  Comment: flat bed, no rails, moved well to each side, safely  CARE Score: 6  Discharge Goal: Independent  Sit to Lying  Assistance Needed: Independent  Comment: no rails, flat bed, efficient, safe. trial from each side of bed  CARE Score: 6  Discharge Goal: Independent    Transfers:    Sit to Stand  Assistance Needed: Independent  Comment: repetitive sit-stand from EOB, repetitive sit-stand to and from chair, and modified indep to and from commode.   CARE Score: 6  Discharge Goal: Independent  Chair/Bed-to-Chair Transfer  Assistance Needed: Independent  Comment: self-managed RW device, multiple SPTs bed-chair, modified indep with RW device, safely  CARE Score: 6  Discharge Goal: Independent     Car Transfer  Assistance Needed: Independent  Comment: patient stated he rode in his/wife's car to transfer from [facility] to [facility]. patient able to open and close door. CARE Score: 6  Discharge Goal: Independent    Ambulation:   Device used PTA: none   Walking Ability  Does the Patient Walk?: Yes     Walk 10 Feet  Assistance Needed: Independent  Physical Assistance Level: No physical assistance  Comment: modified independent, slow and steady, safely  CARE Score: 6  Discharge Goal: Independent     Walk 50 Feet with Two Turns  Assistance Needed: Independent  Physical Assistance Level: No physical assistance  Comment: safe, steady, used RW device, generally slower pacing than his stated normal, good balance and normal pattern. CARE Score: 6  Discharge Goal: Independent     Walk 150 Feet  Assistance Needed: Independent  Physical Assistance Level: No physical assistance  Comment: patient safely ambulated modified independently in pulido and through gym, including to healing garden door to look outside. ambulated 350 feet safely and steadily, good balance, no SOB, pushing walker smoothly and with (somewhat slow) normal pattern. CARE Score: 6  Discharge Goal: Independent     Walking 10 Feet on Uneven Surfaces  Assistance Needed: Supervision or touching assistance  Physical Assistance Level: No physical assistance  Comment: patient navigated rug with objects underneath, turned onto ramp, climbed ramp, descended curb step, advanced from floor back to rug and amb over uneven surface again. 30 feet total, safely, SBA, with RW device. CARE Score: 4  Discharge Goal: Independent     1 Step (Curb)  Assistance Needed: Independent  Physical Assistance Level: No physical assistance  Comment: with RW device  CARE Score: 6  Discharge Goal: Independent     4 Steps  Assistance Needed: Independent  Physical Assistance Level: No physical assistance  Comment: performed reciprocal pattern, bilat rails.   CARE Score: 6  Discharge Goal: Independent     12 Steps  Assistance Needed: Supervision or touching assistance  Physical Assistance Level: No physical assistance  Comment: performed 12 steps, reciprocal pattern, slight standing rest during trial at top of x4 stairs during second of three ascensions, Supervision, with mild SOB. CARE Score: 4  Discharge Goal: Independent    Gait Deviations: []None [x]Slow juan  [] Increased SANJEEV  [] Staggers []Deviated Path  [] Decreased step length  [] Decreased step height  [x]Decreased arm swing  [] Shuffles  [x] Decreased head and trunk rotation  [x]other:  Used RW       Wheelchair:  w/c Ability: Wheelchair Ability  Uses a Wheelchair and/or Scooter?: No                Balance:        Object: Picking Up Object  Assistance Needed: Supervision or touching assistance  Physical Assistance Level: No physical assistance  CARE Score: 4  Discharge Goal: Independent    Assessment:   pleasant male with evolving medical features, admitted to ARU from hospital after that admission for prostate cancer treatment with subsequent bowel perforation and resection with prolonged acute stay/recovery.  high functioning at [facility] today, grossly WITHOUT physical assistance, highly appropriate for brief ARU stay to continue functional recovery. needs to move regularly with and beyond therapy service, needs to be regularly mobile. anticipate successful transition home.        Body structures, Functions, Activity limitations: Decreased functional mobility , Decreased endurance, Decreased strength, Decreased high-level IADLs  Treatment Diagnosis: impaired functional endurance and mobility as compared with high-functioning independent PLOF  Prognosis: Excellent  Decision Making: Medium Complexity         Patient education:   ARU schedule, ARU expectations for participation, plan of care, transition home  Treatment Initiated:  Functional mobility training, gait training, patient education, ADL:hygiene with voiding, dressing feet  Barriers to Improvement:  none  Discharge Recommendations:  Home with wife, possibly [facility] services  Equipment Recommendations:  Should own RW for periodic use. Goals:  Patient Goals   Patient goals : home quickly, feels good overall. Short term goals  Time Frame for Short term goals: about one week  Short term goal 1: indep all bed mobility and bed xfers  Short term goal 2: indep all basic sit-stand xfers from bed/chair/car, supervision for floor xfer  Short term goal 3: amb 150 feet without device, with good balance, independently  Short term goal 4: up and down 12 stairs with modified independence  Short term goal 5: indep with HEP for general conditioning and endurance. Long term goals  Time Frame for Long term goals : anticipate 7 days. Plan:    REQUIRES PT FOLLOW UP: Yes  Pt will be seen at least 60 minutes per day for a minimum of 5 days per week, plus group therapy as appropriate  Plan  Times per week: 5+    PT Individual Minutes  Time In: 0900  Time Out: 1030  Minutes: 90        Timed Code Treatment Minutes: 60 Minutes (plus 30 eval untimed minutes)    Number of Minutes/Billable Intervention    Time in: 0900  Time out: 1030    PT Evaluation 30   Gait Training 20   Therapeutic Exercise    Neuro Re-Ed    Therapeutic Activity 30   Wheelchair Propulsion    Group    Other:  ADL 10   TOTAL        Electronically signed by:     Terrell Pack PT   10/30/2021, 3:50 PM

## 2021-10-30 NOTE — PLAN OF CARE
ARU Interdisciplinary Plan of Care [facility] [address] [facility], [address]  [phone]  Fax: [phone]        Kennedi Spauldingthers    : 1945  Acct #: [[id number]]  MRN: [id number]   PHYSICIAN:  Char Matthews MD  Primary Active Problems:   Active Hospital Problems    Diagnosis Date Noted    Moderate malnutrition ([facility] [address] [E44.0] 10/31/2021     Class: Acute    Generalized weakness [R53.1]     Uncontrolled pain [R52]     Small bowel perforation (HCC) [K63.1]     Acute kidney injury (SUSY) with acute tubular necrosis (ATN) (HCC) [N17.0]     Paroxysmal atrial fibrillation (HCC) [I48.0]     Diverticulitis [K57.92]     History of DVT (deep vein thrombosis) [Z86.718]     Mild protein-calorie malnutrition ([facility] [address] [E44.1]     Critical illness myopathy [G72.81] 10/29/2021    Essential hypertension [I10] 2014       Rehabilitation Diagnosis:     Critical illness myopathy [G72.81]  Critical illness myopathy [G72.81]       ADMIT DATE:10/29/2021   CARE PLAN     NURSING:  Kennedi Hurtado while on this unit will:      Bowel and Bladder   [x] Be continent of bowel and bladder      [x] Have an adequate number of bowel movements   [] Urinate with no urinary retention >300ml in bladder   [] Bladder Scan: (details)   [] Complete bladder protocol with ward removal   [] Initiate Bladder Program to toilet every ___ hours   [] Initiate Bowel Program to toilet every ___hours   [] Bladder training    [] Bowel training  Pulmonary   [x] Maintain O2 SATs at 92% or greater  Pain Management   [x] Have pain managed while on ARU        [] Be pain free by discharge    [] Medication Management and Education  Maintenance of Skin Integrity/Wound Management   [x] Have no skin breakdown while on ARU   [] Have improved skin integrity via wound measurements   [] Have no signs/symptoms of infection via infection protection and monitoring at the          wound site  Fall Prevention   [x] Be free from injury during hospitalization via fall prevention measures     [] Disease management and Education  Precautions   [] Weight Bearing Precautions   [] Swallowing Precautions   [] Monitoring of Risks of Complications   [] DVT Prophylaxis    [] Fluid/electrolyte/Nutrition Management    [] Complete education with patient/family with understanding demonstrated for          in-room safety with transfers to bed, toilet, wheelchair, shower as well as                bathroom activities and hygiene. [] Adjustment   [] Other:   Nursing interventions may include bowel/bladder training, education for medical assistive devices, medication education, O2 saturation management, energy conservation, stress management techniques, fall prevention, alarms protocol, seating and positioning, skin/wound care, pressure relief instruction,dressing changes,  infection protection, DVT prophylaxis, and/or assistance with in room safety with transfers to bed, toilet, wheelchair, shower as well as bathroom activities and hygiene. Patient/caregiver education for:   [x] Disease/sustained injury/management      [x] Medication Use   [] Surgical intervention   [x] Safety/Precautions   [] Body mechanics and or joint protection   [x] Health maintenance         PHYSICAL THERAPY:  Goals:                  Short term goals  Time Frame for Short term goals: about one week  Short term goal 1: indep all bed mobility and bed xfers  Short term goal 2: indep all basic sit-stand xfers from bed/chair/car, supervision for floor xfer  Short term goal 3: amb 150 feet without device, with good balance, independently  Short term goal 4: up and down 12 stairs with modified independence  Short term goal 5: indep with HEP for general conditioning and endurance. Long term goals  Time Frame for Long term goals : anticipate 7 days.   These goals were reviewed with this patient at the time of assessment and Denise Rosa is in agreement. Plan of Care: Pt to be seen 5 days per week for a minimum of 60 minutes for 14 days. community reintegration,animal assisted therapy, and concurrent/group therapy. PT IRF-MAK scores and goals for initial assessment:   Bed Mobility:   Sit to Lying  Assistance Needed: Independent  Comment: no rails, flat bed, efficient, safe. trial from each side of bed  CARE Score: 6  Discharge Goal: Independent  Roll Left and Right  Assistance Needed: Independent  Comment: flat bed, no rails, moved well to each side, safely  CARE Score: 6  Discharge Goal: Independent  Lying to Sitting on Side of Bed  Assistance Needed: Independent  Physical Assistance Level: No physical assistance  Comment: no rail, safe, trial to each EOB  CARE Score: 6  Discharge Goal: Independent    Transfers:    Sit to Stand  Assistance Needed: Independent  Comment: repetitive sit-stand from EOB, repetitive sit-stand to and from chair, and modified indep to and from commode. CARE Score: 6  Discharge Goal: Independent  Chair/Bed-to-Chair Transfer  Assistance Needed: Supervision or touching assistance  Comment: SBA, pt unsteady, very slow  CARE Score: 4  Discharge Goal: Independent     Car Transfer  Assistance Needed: Independent  Comment: patient stated he rode in his/wife's car to transfer from [facility] to [facility] ARU. patient able to open and close door.   CARE Score: 6  Discharge Goal: Independent    Ambulation:    Walking Ability  Does the Patient Walk?: Yes     Walk 10 Feet  Assistance Needed: Supervision or touching assistance  Physical Assistance Level: No physical assistance  Comment: very slow, pt unsure of self with 2ww  CARE Score: 4  Discharge Goal: Independent     Walk 50 Feet with Two Turns  Assistance Needed: Supervision or touching assistance  Physical Assistance Level: No physical assistance  Comment: slow gait with 2ww, decreased step height, narrow SANJEEV  CARE Score: 4  Discharge Goal: Independent     Walk 150 Feet  Assistance Needed: Independent  Physical Assistance Level: No physical assistance  Comment: Pt not able to ambulate this distance today due to decreased activity tolerance  Reason if not Attempted: Not attempted due to medical condition or safety concerns  CARE Score: 88  Discharge Goal: Independent     Walking 10 Feet on Uneven Surfaces  Assistance Needed: Supervision or touching assistance  Physical Assistance Level: No physical assistance  Comment: patient navigated rug with objects underneath, turned onto ramp, climbed ramp, descended curb step, advanced from floor back to rug and amb over uneven surface again. 30 feet total, safely, SBA, with RW device.   CARE Score: 4  Discharge Goal: Independent     1 Step (Curb)  Assistance Needed: Supervision or touching assistance  Physical Assistance Level: No physical assistance  Comment: CG for steadying  CARE Score: 4  Discharge Goal: Independent     4 Steps  Assistance Needed: Supervision or touching assistance  Physical Assistance Level: No physical assistance  Comment: SBA with cane and one rail, step-to pattern, mod fatigue  CARE Score: 4  Discharge Goal: Independent     12 Steps  Assistance Needed: Supervision or touching assistance  Physical Assistance Level: No physical assistance  Comment: unable to tolerate 12 steps due to decreased activiy tolerance today  Reason if not Attempted: Not attempted due to medical condition or safety concerns  CARE Score: 88  Discharge Goal: Independent    Gait Deviations: []None []Slow juan  [] Increased SANJEEV  [] Staggers []Deviated Path  [] Decreased step length  [] Decreased step height  []Decreased arm swing  [] Shuffles  [] Decreased head and trunk rotation  []other:        Wheelchair:  w/c Ability: Wheelchair Ability  Uses a Wheelchair and/or Scooter?: No                Balance:        Object: Picking Up Object  Assistance Needed: Supervision or touching assistance  Physical Assistance Level: No physical assistance  CARE Score: 4  Discharge Goal: Independent  OCCUPATIONAL THERAPY:  Goals:             Short term goals  Time Frame for Short term goals: STG=LTG :  Long term goals  Time Frame for Long term goals : 5-7 days or until discharge  Long term goal 1: Pt will perform all grooming tasks with Mod I.  Long term goal 2: Pt will perform UB dressing with Mod I.  Long term goal 3: Pt will perform LB dressing with Mod I.  Long term goal 4: Pt will don/doff footwear with Mod I.  Long term goal 5: Pt will perform bathing with Mod I.  Long term goals 6: Pt will perform toileting with Mod I.  Long term goal 7: Pt will perform all fxl transfers (toilet, shower, etc.) with Mod I, DME PRN. Long term goal 8: Pt will participate in therex/theract with emphasis on dynamic stance >10-12 min to increase endurance and dynamic standing tolerance needed for ADLs/IADLs :    These goals were reviewed with this patient at the time of assessment and Brad Comer is in agreement    Plan of Care:  Pt to be seen 5 days per week for a minimum of 60 minutes for 14 days. Times per week: 5x   Plan  Times per week: 5x  Times per day: Daily  Plan weeks: 5-7 days or until discharge  Current Treatment Recommendations: Strengthening, Functional Mobility Training, Gait Training, Patient/Caregiver Education & Training, Home Management Training, Endurance Training, Stair training, Pain Management, Equipment Evaluation, Education, & procurement, Balance Training, Safety Education & Training, Self-Care / ADL         cognitive training, home management, energy conservation training, community reintegration, splint fabrication, patient/caregiver education and training, animal assisted therapy, and concurrent and/or group therapy.       OT IRF-MAK scores and goals for initial assessment:    ADLs:    Eating: Eating  Assistance Needed: Independent  Comment: Open and eat applesauce with Ind.  CARE Score: 6       Oral Hygiene: Oral Hygiene  Assistance Needed: Supervision or touching assistance  Comment: in stance at sink  CARE Score: 4  Discharge Goal: Independent    UB/LB Bathing: Shower/Bathe Self  Assistance Needed: Supervision or touching assistance  Comment: supervision for full shower seated  CARE Score: 4  Discharge Goal: Independent    UB Dressing: Upper Body Dressing  Assistance Needed: Supervision or touching assistance  Comment: retrieved clothing from closet with SBA  CARE Score: 4  Discharge Goal: Independent         LB Dressing: Lower Body Dressing  Assistance Needed: Supervision or touching assistance  Comment: SBA to doff/don Depends and pants  CARE Score: 4  Discharge Goal: Independent    Donning and [place] Footwear: Putting On/Taking Off Footwear  Assistance Needed: Setup or clean-up assistance  Comment: to doff/don personal slip on shoes  CARE Score: 5  Discharge Goal: Independent      Toileting: Toileting Hygiene  Comment: Pt performed toileting during PT session. Reason if not Attempted: Patient refused  CARE Score: 7  Discharge Goal: Independent      Toilet Transfers: Toilet Transfer  Assistance Needed: Supervision or touching assistance  Comment: SBA c SPC, grab bar  CARE Score: 4  Discharge Goal: Independent      SPEECH THERAPY: (If ordered)  Plan of Care and Goals:   LTG                                                         LTG:                           Treatments may include speech/language/communication therapy, cognitive training, animal assisted therapy, group therapy, education, and/or dysphagia therapy based on the above goals. Co-treats where appropriate with PT or OT to facilitate patient goals in functional tasks. These goals were reviewed with this patient at the time of assessment and Josh Abraham is in agreement.     CASE MANAGEMENT:  Goals:   Assist patient/family with discharge planning, patient/family counseling, assistance in obtaining recommended equipment and other services, and coordination with insurance during ARU stay. Patient Goals: Return to maximum level of independent function. Activities Prior to Admit:   Homemaking Responsibilities: Yes  Active : Yes  Mode of Transportation: Truck, Car  Occupation: Full time employment  Leisure & Hobbies: Reading, watching TV, working out at [address] will be seen a minimum of 3 hours of therapy per day/a minimum of 5 out of 7 days per week. [] In this rare instance due to the nature of this patient's medical involvement, this patient will be seen 15 hours per week (900 minutes within a 7 day period). Treatments may include therapeutic exercises, gait training, neuromuscular re-ed, transfer training, community reintegration, bed mobility, w/c mobility and training, self care, home mgmt, cognitive training, energy conservation,dysphagia tx, speech/language/communication therapy, group therapy, and patient/family education. In addition, dietician/nutritionist may monitor calorie count as well as intake and collaboratively work with SLP on dietary upgrades. Neuropsychology/Psychology may evaluate and provide necessary support. Group therapy as appropriate to facilitate improved endurance, STR, COORD, function, safety, transfers, awareness and insight into deficits, problem solving, memory, and social interaction and engagement.     Medical issues being managed closely and that require 24 hour availability of a physician:   [] Swallowing Precautions                                     [] Weight bearing precautions   [x] Wound Care                             [x] Infection Prevention   [x] DVT Prophylaxis/assessment              [x] Monitoring for complications    [x] Fall Precautions/Prevention                         [x] Fluid/Electrolyte/Nutrition Balance   [] Voice Protection                           [x] Medication Management   [x] Respiratory                   [x] Pain Mgmt   [x] Bowel/Bladder Fx    Medical Prognosis: [] Good  [x] Fair    [] Guarded   Total expected IRF days 14                                            Physician anticipated functional outcomes:  FWW and HHC RN/OT/PT and supervision. Rehab Goals:   [] Return to premorbid function of_______________________________.    [] Independent   [] Mod I  [x] Supervision  [] CGA   [] Min A   [] Mod A  Level for ambulation []without assistive device  [x] with assistive device        [] Independent   [] Mod I  [x] Supervision [] CGA   [] Min A   [] Mod A  Level for transfers []without assistive device  [x] with assistive device         [] Independent   [] Mod I  [x] Supervision [] CGA   [] Min A   [] Mod A Level with ADL's []without assistive device   [x] with assistive device     ___________________     Level with cognitive skills requiring [] No assist [x] Supervision  [] Active Assist/Cues     [] Maximize level of mobility and ADL's to decrease burden on caregiver    1. IPOC brief synthesis of Preadmission Screen, Post-Admission Evaluation, and Therapy Evaluations: Critical illness myopathy with gait disturbance: The patient requires daily occupational and physical therapy. He requires adaptive equipment training, caregiver education and bowel and bladder retraining. We does monitor his wounds for signs of infection. He needs nutritional support and will be placed on a general diet. We must provide DVT prophylaxis cautious pain management. Must maximize treatment of his atrial fibrillation, hypertension and recent kidney injury. 2. DVT prophylaxis: Lovenox 40 mg subcu daily. I must monitor his hemoglobin and platelet count periodically while on this medication. Weightbearing activities will be pursued daily. GI prophylaxis is available. 3. Uncontrolled pain: Acetaminophen, muscle relaxants and oxycodone as needed. Bowel intervention while on the narcotics. Progressive mobilization. 4. Radical prostatectomy: Patient is on Flomax.   We must monitor his urinary output. Urology follow-up as an outpatient. 5. Small bowel perforation with moderate protein calorie malnutrition: Monitoring his drain sites and wound for ongoing healing. Increasing his oral intake. Outpatient follow-up with general surgery. 6. Paroxysmal atrial fibrillation: Coreg for rate control. Daily weights to detect any decompensation of CHF. Vital signs are checked at rest and with activity. 7.   Hypertension: His Coreg also helps manage his systolic blood pressure. Target systolic blood pressure ranges 120-140. Vital signs are checked at rest and with activity. 8. History of DVT: Progressive mobilization. Lovenox for DVT prophylaxis. Monitoring him for signs of thrombosis.     Anticipated discharge destination:    [] Home Independently   [x] Home with supervision    []SNF     [] Other       This plan has been reviewed with me in a language I understand.  I have had the opportunity to include my input with my therapy team.    ________________________________________________   ______________________  Patient/Significant Other      Date    I have reviewed this initial plan of care and agree with its contents:    Title   Name    Date    Time    Physician: Sara Brownlee MD 11/1/2021 6:07 PM    Case Mgmt: Jasbir Ortega, MSW, LSW 11/01/21 1400    OT: Marck Sanchez OTR/L (initial eval completed by Brigida Mandel OTR/L 10/30/2021 1516)    PT:  Horace Sandra, [address] [facility]  3:43 PM 11/1/2021     RN: Diana Stanford RN 11/1/21    ST:    Dietician:

## 2021-10-30 NOTE — PROGRESS NOTES
Dr Juan Antonio Jain notified of med times. Pharmacy to correct med administration times. Margaret Farris in the pharmacy notified.

## 2021-10-31 PROBLEM — E44.0 MODERATE MALNUTRITION (HCC): Status: ACTIVE | Noted: 2021-10-31

## 2021-10-31 LAB
ANION GAP SERPL CALCULATED.3IONS-SCNC: 9 MMOL/L (ref 4–16)
BUN BLDV-MCNC: 10 MG/DL (ref 6–23)
CALCIUM SERPL-MCNC: 8.2 MG/DL (ref 8.3–10.6)
CHLORIDE BLD-SCNC: 101 MMOL/L (ref 99–110)
CO2: 24 MMOL/L (ref 21–32)
CREAT SERPL-MCNC: 0.7 MG/DL (ref 0.9–1.3)
GFR AFRICAN AMERICAN: >60 ML/MIN/1.73M2
GFR NON-AFRICAN AMERICAN: >60 ML/MIN/1.73M2
GLUCOSE BLD-MCNC: 125 MG/DL (ref 70–99)
HCT VFR BLD CALC: 29.9 % (ref 42–52)
HEMOGLOBIN: 9.5 GM/DL (ref 13.5–18)
MCH RBC QN AUTO: 29 PG (ref 27–31)
MCHC RBC AUTO-ENTMCNC: 31.8 % (ref 32–36)
MCV RBC AUTO: 91.2 FL (ref 78–100)
PDW BLD-RTO: 14.1 % (ref 11.7–14.9)
PLATELET # BLD: 440 K/CU MM (ref 140–440)
PMV BLD AUTO: 9.6 FL (ref 7.5–11.1)
POTASSIUM SERPL-SCNC: 4.2 MMOL/L (ref 3.5–5.1)
RBC # BLD: 3.28 M/CU MM (ref 4.6–6.2)
SODIUM BLD-SCNC: 134 MMOL/L (ref 135–145)
WBC # BLD: 6.6 K/CU MM (ref 4–10.5)

## 2021-10-31 PROCEDURE — 80048 BASIC METABOLIC PNL TOTAL CA: CPT

## 2021-10-31 PROCEDURE — 36415 COLL VENOUS BLD VENIPUNCTURE: CPT

## 2021-10-31 PROCEDURE — 6370000000 HC RX 637 (ALT 250 FOR IP): Performed by: PHYSICAL MEDICINE & REHABILITATION

## 2021-10-31 PROCEDURE — 1280000000 HC REHAB R&B

## 2021-10-31 PROCEDURE — 94150 VITAL CAPACITY TEST: CPT

## 2021-10-31 PROCEDURE — 85027 COMPLETE CBC AUTOMATED: CPT

## 2021-10-31 PROCEDURE — 94761 N-INVAS EAR/PLS OXIMETRY MLT: CPT

## 2021-10-31 PROCEDURE — 6360000002 HC RX W HCPCS: Performed by: PHYSICAL MEDICINE & REHABILITATION

## 2021-10-31 RX ADMIN — CLOPIDOGREL BISULFATE 75 MG: 75 TABLET ORAL at 11:43

## 2021-10-31 RX ADMIN — ONDANSETRON 4 MG: 4 TABLET, ORALLY DISINTEGRATING ORAL at 23:01

## 2021-10-31 RX ADMIN — MAGNESIUM HYDROXIDE 30 ML: 400 SUSPENSION ORAL at 14:47

## 2021-10-31 RX ADMIN — ASPIRIN 81 MG: 81 TABLET, CHEWABLE ORAL at 11:43

## 2021-10-31 RX ADMIN — ACETAMINOPHEN 650 MG: 325 TABLET ORAL at 00:57

## 2021-10-31 RX ADMIN — ATORVASTATIN CALCIUM 80 MG: 40 TABLET, FILM COATED ORAL at 21:15

## 2021-10-31 RX ADMIN — CARVEDILOL 3.12 MG: 6.25 TABLET, FILM COATED ORAL at 17:00

## 2021-10-31 RX ADMIN — CARVEDILOL 3.12 MG: 6.25 TABLET, FILM COATED ORAL at 11:44

## 2021-10-31 RX ADMIN — ONDANSETRON 4 MG: 4 TABLET, ORALLY DISINTEGRATING ORAL at 04:07

## 2021-10-31 RX ADMIN — TAMSULOSIN HYDROCHLORIDE 0.4 MG: 0.4 CAPSULE ORAL at 11:43

## 2021-10-31 ASSESSMENT — PAIN DESCRIPTION - PAIN TYPE: TYPE: ACUTE PAIN

## 2021-10-31 ASSESSMENT — PAIN SCALES - GENERAL: PAINLEVEL_OUTOF10: 4

## 2021-10-31 ASSESSMENT — PAIN DESCRIPTION - ORIENTATION: ORIENTATION: LOWER

## 2021-10-31 ASSESSMENT — PAIN DESCRIPTION - DESCRIPTORS: DESCRIPTORS: ACHING;DISCOMFORT

## 2021-10-31 ASSESSMENT — PAIN DESCRIPTION - LOCATION: LOCATION: BACK

## 2021-10-31 NOTE — CONSULTS
Comprehensive Nutrition Assessment    Type and Reason for Visit:  Initial, Consult (Oral Nutrition Supplements)    Nutrition Recommendations/Plan:   · Continue current diet  · Will send Low Calorie, High Protein once daily   · Monitor po intakes, GI status, pain levels, and poc     Nutrition Assessment:  Rehab admit with critical illness myopathy with gait disturbance. PMH: diverticulitis, recent ex lap,small bowel resection and washout s/p SB perforation, TPN over 10 days. Pt transferred from [facility] in [place], pt reports leaving on clear liquids and used walker. Currently on regular diet and thin liquids. Pt wanting a BM, awaiting for staff to assist due to fall precautions, causing pt to not eat breakfast. Pt discussed frustrations and desire to start OT/PT therapy. C/o generalized muscle wasting, abdominal pain when havign BM. Performed NFPE. Meets criteria for acute moderate malnutrition. Follow as high nutrition risk. Malnutrition Assessment:  Malnutrition Status: Moderate malnutrition    Context:  Acute Illness     Findings of the 6 clinical characteristics of malnutrition:  Energy Intake:  Mild decrease in energy intake (Comment)  Weight Loss:  No significant weight loss     Body Fat Loss:  7 - Moderate body fat loss Orbital, Buccal region, Triceps   Muscle Mass Loss:    Clavicles (pectoralis & deltoids), Thigh (quadraceps), Calf (gastrocnemius), Temples (temporalis)  Fluid Accumulation:  No significant fluid accumulation Extremities   Strength:  Not Performed    Estimated Daily Nutrient Needs:  Energy (kcal):  5176-0722 ([place] 1898); Weight Used for Energy Requirements:  Current     Protein (g):  101-126 (1.2-1.5 g/kg IBW);  Weight Used for Protein Requirements:  Ideal        Fluid (ml/day):  ~2300; Method Used for Fluid Requirements:  1 ml/kcal      Nutrition Related Findings:  Na 134, Glu 125      Wounds:  Surgical Incision       Current Nutrition Therapies:    ADULT DIET; Regular    Anthropometric Measures:  · Height: 6' 1\" (185.4 cm)  · Current Body Weight: 211 lb 6.7 oz (95.9 kg)   · Admission Body Weight:      · Usual Body Weight: 214 lb (97.1 kg) (July 2021)     · Ideal Body Weight: 184 lbs; % Ideal Body Weight 114.9 %   · BMI: 27.9  · Adjusted Body Weight:  ; No Adjustment   · BMI Categories: Overweight (BMI 25.0-29. 9)       Nutrition Diagnosis:   · Moderate malnutrition, In context of acute illness or injury related to acute injury/trauma, altered GI function, altered GI structure as evidenced by poor intake prior to admission, moderate loss of subcutaneous fat, moderate muscle loss (NPO or clear liquid status PTA)    Nutrition Interventions:   Food and/or Nutrient Delivery:  Continue Current Diet, Start Oral Nutrition Supplement  Nutrition Education/Counseling:  No recommendation at this time   Coordination of Nutrition Care:  Continue to monitor while inpatient    Goals:  Pt will consume at least 50% of meals and supplements       Nutrition Monitoring and Evaluation:   Behavioral-Environmental Outcomes:  None Identified   Food/Nutrient Intake Outcomes:  Food and Nutrient Intake, Diet Advancement/Tolerance, Supplement Intake  Physical Signs/Symptoms Outcomes:  Biochemical Data, GI Status, Meal Time Behavior, Weight, Skin, Nutrition Focused Physical Findings     Discharge Planning:     Too soon to determine     Electronically signed by Jacquie Bazzi RD, LD on 10/31/21 at 12:33 PM EDT    Contact: [phone]

## 2021-11-01 PROCEDURE — 6370000000 HC RX 637 (ALT 250 FOR IP): Performed by: PHYSICAL MEDICINE & REHABILITATION

## 2021-11-01 PROCEDURE — 6360000002 HC RX W HCPCS: Performed by: PHYSICAL MEDICINE & REHABILITATION

## 2021-11-01 PROCEDURE — 99232 SBSQ HOSP IP/OBS MODERATE 35: CPT | Performed by: PHYSICAL MEDICINE & REHABILITATION

## 2021-11-01 PROCEDURE — 97530 THERAPEUTIC ACTIVITIES: CPT

## 2021-11-01 PROCEDURE — 94761 N-INVAS EAR/PLS OXIMETRY MLT: CPT

## 2021-11-01 PROCEDURE — 97110 THERAPEUTIC EXERCISES: CPT

## 2021-11-01 PROCEDURE — 97116 GAIT TRAINING THERAPY: CPT

## 2021-11-01 PROCEDURE — 1280000000 HC REHAB R&B

## 2021-11-01 PROCEDURE — 97535 SELF CARE MNGMENT TRAINING: CPT

## 2021-11-01 RX ORDER — LACTULOSE 10 G/15ML
20 SOLUTION ORAL 2 TIMES DAILY
Status: DISPENSED | OUTPATIENT
Start: 2021-11-01 | End: 2021-11-03

## 2021-11-01 RX ORDER — DOCUSATE SODIUM 100 MG/1
100 CAPSULE, LIQUID FILLED ORAL 2 TIMES DAILY
Status: DISCONTINUED | OUTPATIENT
Start: 2021-11-01 | End: 2021-11-06 | Stop reason: HOSPADM

## 2021-11-01 RX ORDER — SENNA PLUS 8.6 MG/1
2 TABLET ORAL NIGHTLY
Status: DISCONTINUED | OUTPATIENT
Start: 2021-11-01 | End: 2021-11-06 | Stop reason: HOSPADM

## 2021-11-01 RX ADMIN — MAGNESIUM HYDROXIDE 30 ML: 400 SUSPENSION ORAL at 09:04

## 2021-11-01 RX ADMIN — CARVEDILOL 3.12 MG: 6.25 TABLET, FILM COATED ORAL at 09:09

## 2021-11-01 RX ADMIN — ACETAMINOPHEN 650 MG: 325 TABLET ORAL at 05:51

## 2021-11-01 RX ADMIN — ASPIRIN 81 MG: 81 TABLET, CHEWABLE ORAL at 09:04

## 2021-11-01 RX ADMIN — ATORVASTATIN CALCIUM 80 MG: 40 TABLET, FILM COATED ORAL at 21:07

## 2021-11-01 RX ADMIN — CARVEDILOL 3.12 MG: 6.25 TABLET, FILM COATED ORAL at 16:58

## 2021-11-01 RX ADMIN — CLOPIDOGREL BISULFATE 75 MG: 75 TABLET ORAL at 09:04

## 2021-11-01 RX ADMIN — POLYETHYLENE GLYCOL (3350) 17 G: 17 POWDER, FOR SOLUTION ORAL at 09:04

## 2021-11-01 RX ADMIN — TAMSULOSIN HYDROCHLORIDE 0.4 MG: 0.4 CAPSULE ORAL at 09:04

## 2021-11-01 ASSESSMENT — PAIN SCALES - GENERAL
PAINLEVEL_OUTOF10: 3
PAINLEVEL_OUTOF10: 0

## 2021-11-01 NOTE — PATIENT CARE CONFERENCE
ACUTE REHAB TEAM CONFERENCE SUMMARY   [facility]    NAME: Karel Herrera  : 1945 ADMIT DATE: 10/29/2021    Rehab Admitting Dx:Critical illness myopathy [G72.81]  Critical illness myopathy [G72.81]  Patient Comorbid Conditions: Active Hospital Problems    Diagnosis Date Noted    Moderate malnutrition ([address] [E44.0] 10/31/2021     Class: Acute    Generalized weakness [R53.1]     Uncontrolled pain [R52]     Small bowel perforation (HCC) [K63.1]     Acute kidney injury (SUSY) with acute tubular necrosis (ATN) (HCC) [N17.0]     Paroxysmal atrial fibrillation (HCC) [I48.0]     Diverticulitis [K57.92]     History of DVT (deep vein thrombosis) [Z86.718]     Mild protein-calorie malnutrition (HCC) [E44.1]     Critical illness myopathy [G72.81] 10/29/2021    Essential hypertension [I10] 2014     Date: 2021    CASE MANAGEMENT  Current issues/needs regarding patient and family discharge status: Patient lives with wife Sudha Bose in a 1L, 3 GIBRAN home. Patient reports having a C at home. Plan is to discharge home with wife. PHYSICAL THERAPY (Updated in QI)  Short term goals  Time Frame for Short term goals: about one week  Short term goal 1: indep all bed mobility and bed xfers  Short term goal 2: indep all basic sit-stand xfers from bed/chair/car, supervision for floor xfer  Short term goal 3: amb 150 feet without device, with good balance, independently  Short term goal 4: up and down 12 stairs with modified independence  Short term goal 5: indep with HEP for general conditioning and endurance. Long term goals  Time Frame for Long term goals : anticipate 7 days. Impairments/deficits, barriers:   Activity tolerance, strength  Body structures, Functions, Activity limitations: Decreased functional mobility , Decreased endurance, Decreased strength, Decreased high-level IADLs  Treatment Diagnosis: impaired functional endurance and mobility as compared with high-functioning independent PLOF  Prognosis: Excellent  Decision Making: Medium Complexity     Equipment needed at discharge:2ww vs cane      PT IRF-MAK scores since initial assessment  Bed Mobility:   Sit to Lying  Assistance Needed: Independent  Comment: no rails, flat bed, efficient, safe. trial from each side of bed  CARE Score: 6  Discharge Goal: Independent    Roll Left and Right  Assistance Needed: Independent  Comment: flat bed, no rails, moved well to each side, safely  CARE Score: 6  Discharge Goal: Independent    Lying to Sitting on Side of Bed  Assistance Needed: Independent  Physical Assistance Level: No physical assistance  Comment: no rail, safe, trial to each EOB  CARE Score: 6  Discharge Goal: Independent    Transfers:    Sit to Stand  Assistance Needed: Independent  CARE Score: 6  Discharge Goal: Independent    Chair/Bed-to-Chair Transfer  Assistance Needed: Supervision or touching assistance  Comment: SBA, pt unsteady, very slow  CARE Score: 4  Discharge Goal: Independent         Car Transfer  Assistance Needed: Independent   patient able to open and close door.   CARE Score: 6  Discharge Goal: Independent    Ambulation:    Walking Ability  Does the Patient Walk?: Yes     Walk 10 Feet  Assistance Needed: Supervision or touching assistance  Comment: very slow, pt unsure of self with 2ww  CARE Score: 4  Discharge Goal: Independent     Walk 50 Feet with Two Turns  Assistance Needed: Supervision or touching assistance  Comment: slow gait with 2ww, decreased step height, narrow SANJEEV  CARE Score: 4  Discharge Goal: Independent     Walk 150 Feet  Comment: Pt not able to ambulate this distance today due to decreased activity tolerance  Reason if not Attempted: Not attempted due to medical condition or safety concerns  CARE Score: 88  Discharge Goal: Independent     Walking 10 Feet on Uneven Surfaces  Assistance Needed: Supervision or touching assistance  Physical Assistance Level: No physical assistance  Comment: patient navigated rug with objects underneath, turned onto ramp, climbed ramp, descended curb step, advanced from floor back to rug and amb over uneven surface again. 30 feet total, safely, SBA, with RW device. CARE Score: 4  Discharge Goal: Independent     1 Step (Curb)  Assistance Needed: Supervision or touching assistance  Comment: CG for steadying  CARE Score: 4  Discharge Goal: Independent     4 Steps  Assistance Needed: Supervision or touching assistance  Physical Assistance Level: No physical assistance  Comment: SBA with cane and one rail, step-to pattern, mod fatigue  CARE Score: 4  Discharge Goal: Independent     12 Steps  Comment: unable to tolerate 12 steps due to decreased activiy tolerance today  Reason if not Attempted: Not attempted due to medical condition or safety concerns  CARE Score: 88  Discharge Goal: Independent           Wheelchair:  w/c Ability: Wheelchair Ability  Uses a Wheelchair and/or Scooter?: No                        Balance:        Object: Picking Up Object  Assistance Needed: Supervision or touching assistance  Physical Assistance Level: No physical assistance  CARE Score: 4  Discharge Goal: Independent    Fall Risk: [x]  Yes  []  No    OCCUPATIONAL THERAPY  (Updated in QI)  Short term goals  Time Frame for Short term goals: STG=LTG :   Long term goals  Time Frame for Long term goals : 5-7 days or until discharge  Long term goal 1: Pt will perform all grooming tasks with Mod I.  Long term goal 2: Pt will perform UB dressing with Mod I.  Long term goal 3: Pt will perform LB dressing with Mod I.  Long term goal 4: Pt will don/doff footwear with Mod I.  Long term goal 5: Pt will perform bathing with Mod I.  Long term goals 6: Pt will perform toileting with Mod I.  Long term goal 7: Pt will perform all fxl transfers (toilet, shower, etc.) with Mod I, DME PRN.   Long term goal 8: Pt will participate in therex/theract with emphasis on dynamic stance >10-12 min to increase endurance and dynamic standing tolerance needed for ADLs/IADLs :                                       OT IRF-MAK scores and goals since initial assessment:    ADLs:    Eating: Eating  Assistance Needed: Independent  Comment: Open and eat applesauce with Ind.  CARE Score: 6       Oral Hygiene: Oral Hygiene  Assistance Needed: Supervision or touching assistance  Comment: in stance at sink  CARE Score: 4  Discharge Goal: Independent    UB/LB Bathing: Shower/Bathe Self  Assistance Needed: Supervision or touching assistance  Comment: supervision for full shower seated  CARE Score: 4  Discharge Goal: Independent    UB Dressing: Upper Body Dressing  Assistance Needed: Supervision or touching assistance  Comment: retrieved clothing from closet with SBA  CARE Score: 4  Discharge Goal: Independent         LB Dressing: Lower Body Dressing  Assistance Needed: Supervision or touching assistance  Comment: SBA to doff/don Depends and pants  CARE Score: 4  Discharge Goal: Independent    Donning and [place] Footwear: Putting On/Taking Off Footwear  Assistance Needed: Setup or clean-up assistance  Comment: to doff/don personal slip on shoes  CARE Score: 5  Discharge Goal: Independent      Toileting: Toileting Hygiene  Comment: SBA  CARE Score: 4  Discharge Goal: Independent      Toilet Transfers:    Toilet Transfer  Assistance Needed: Supervision or touching assistance  Comment: SBA c SPC, grab bar  CARE Score: 4  Discharge Goal: Independent      Impairments/deficits, barriers:  Decreased balance, endurance  Assessment  Performance deficits / Impairments: Decreased functional mobility , Decreased strength, Decreased endurance, Decreased ADL status, Decreased high-level IADLs, Decreased balance, Decreased fine motor control  Prognosis: Good  Decision Making: Medium Complexity  REQUIRES OT FOLLOW UP: Yes  Discharge Recommendations: Continue to assess pending progress, Home with assist PRN  Equipment needed at discharge:  Shower chair      COGNITIVE FUNCTION/SPEECH THERAPY (AS INDICATED)  LTG                                                 Nursing Current Medical Status:   [x] Is continent of bowel and bladder     [] Is incontinent of bowel and bladder    [x] Has had an adequate number of bowel movements   [] Urinates with no urinary retention >300ml in bladder   [] Targeting bladder protocol with ward removal   [x] Maintaining O2 SATs at 92% or greater   [x] Has pain managed while on ARU         [x] Has had no skin breakdown while on ARU   [] Has improved skin integrity via wound measurements   [] Has no signs/symptoms of infection at the wound site   [] Pressure wounds Stage/Location:    [] Arrived on unit with pressure wound  [x] Has been free from injury during hospitalization   [] Has experienced a fall during hospitalization  [] Ongoing education with patient/family with understanding demonstrated for:  [] Receives IV Fluids  [x] Other:Had BM, blood pressures are variable, drainage from drain sites      NUTRITION  Weight: 208 lb 5.4 oz (94.5 kg) / Body mass index is 27.49 kg/m². Current diet: ADULT DIET; Regular  ADULT ORAL NUTRITION SUPPLEMENT; Breakfast; Low Calorie/High Protein Oral Supplement  Intake: Varying intake, recent meals 25-75%       Medical improvements/barriers: reduced activity tolerance, extra time needed for ADL's        Team goals for next treatment period/Intervention for current barriers:   [x] Pt will increase activity tolerance for daily tasks.   [] Pt will improve bed mobility with reduced assist.  [x] Pt will improve safety in fx tasks with reduced cues/assist  [] Pt will improve transfers with reduced assist  [x] Pt will improve toileting with reduced assist  [x] Pt will improve ADL's with use of adaptive equipment with reduced assist  [] Pt will improve pain mgmt for maximum participation in tx program  [] Pt will improve communication to get basic needs met on unit  [] Pt will improve swallowing for safe diet advancement with use of strategies  []  Plan for discharge to home. Patient Strengths: Motivated, Cooperative and Pleasant    Justification for Continued Stay  Based on my medical assessment of the patient and review of information from the interdisciplinary team as part of this weekly team conference, the patient continues to meet the following criteria for IRF level of care:   The patient requires active and ongoing intervention of multiple therapy disciplines   The patient requires and intensive rehabilitation therapy program   The patient requires continued physician supervision by a rehabilitation physician   The patient requires 24 hours rehab nursing care   The patient requires an intensive and coordinated interdisciplinary team approach to the delivery of rehabilitative care. Assessment/Plan   [x]  The patient is making good progression towards their long term goals and is actively participating in and has a reasonable expectation to continue to benefit from the intensive rehabilitation therapy program   []  The estimated discharge date has been changed from initial team conference due to:   []  The estimated discharge destination has been changed from initial team conference due to:         Ongoing tx following discharge: [x]HHC PT OT   []OUTPATIENT     [] No Further Treatment     [] Family/Caregiver Training  []  Transitional Living Arrangement   [] Home Assessment (date  )     [] Family Conference   []  Therapeutic Pass       []  Other: (specify)    Estimated Discharge Date: 11/5/21    Estimated Discharge Destination: []home alone   []home alone with assist prn  []Continuous supervision [x]Return home with s/o/spouse/family   [] Assisted living    []SNF     Team members participating in today's conference.     [x] MONIKA Loredo, Medical Director  [x] Abdiaziz Ramires,    [] Andressa Olivia, Nurse Mgr [x] George Winn, Nurse Supervisor   []  Tee Cervantes, PT   [] Narciso Prado, OT   [x] Eufemia Mclaughlin, PT  [x] Earl Woods, OT      [x]  Maribel Izaguirre, SLP    []  Dexter Felipe, CHAD   [] Jayson Crystal, CHAD   []  Tigist Hollis, TAINA     [x] Ventura Fisher,     [x]Mercedes Naranjo,     [] Cyndi Veloz RN[] Martin Lee RN     [] Tim Burns RN    [] Fatuma Ramirez RN    [] Fredy Castañeda RN  [] Dontrell Larios RN    []     I have led this Team Conference and agree with the plan, Jhonathan Mckeon MD, 11/2/2021, 12:41 PM  Goals have been updated to reflect recent status.     Team conference note transcribed this date by: Malinda Bender MA, [id number] [facility], Therapy Coordinator

## 2021-11-01 NOTE — PROGRESS NOTES
Occupational Therapy    Physical Rehabilitation: OCCUPATIONAL THERAPY     [x] daily progress note       [] discharge       Patient Name:  Shannan Benton   :  1945 MRN: [id number]  Room:  [address] [place] [address] Date of Admission: 10/29/2021  Rehabilitation Diagnosis:   Critical illness myopathy [G72.81]  Critical illness myopathy [G72.81]       Date 2021       Day of ARU Week:  4   Time IN/OUT 1105/1210  1400/1455   Individual Tx Minutes 65+55   Group Tx Minutes    Co-Treat Minutes    Concurrent Tx Minutes    TOTAL Tx Time Mins 120   Variance Time    Variance Time []   Refusal due to:     []   Medical hold/reason:    []   Illness   []   Off Unit for test/procedure  []   Extra time needed to complete task  []   Therapeutic need  []   Other (specify):   Restrictions Restrictions/Precautions: General Precautions, Fall Risk         Communication with other providers: [x]   OK to see per nursing:     []   Spoke with team member regarding:      Subjective observations and cognitive status: In AM and PM, pt in recliner, agreeable to tx session.   Moves at a slower pace to complete tasks     Pain level/location:    /10       Location: Denied, but reports discomfort from constipation   Discharge recommendations  Anticipated discharge date:  TBD  Destination: []home alone   []home alone w assist prn   [x] home w/ family    [] Continuous supervision       []SNF    [] Assisted living     [] Other:   Continued therapy: []HHC OT  []OUTPATIENT  OT   [] No Further OT  Equipment needs: TBD       ADLs:     Oral Hygiene: Oral Hygiene  Assistance Needed: Supervision or touching assistance  Comment: in stance at sink  CARE Score: 4  Discharge Goal: Independent    UB/LB Bathing: Shower/Bathe Self  Assistance Needed: Supervision or touching assistance  Comment: supervision for full shower seated  CARE Score: 4  Discharge Goal: Independent    UB Dressing: Upper Body Dressing  Assistance Needed: Supervision or touching assistance  Comment: retrieved clothing from closet with SBA  CARE Score: 4  Discharge Goal: Independent         LB Dressing: Lower Body Dressing  Assistance Needed: Supervision or touching assistance  Comment: SBA to doff/don Depends and pants  CARE Score: 4  Discharge Goal: Independent    Donning and [place] Footwear: Putting On/Taking Off Footwear  Assistance Needed: Setup or clean-up assistance  Comment: to doff/don personal slip on shoes  CARE Score: 5  Discharge Goal: Independent      Toileting: Not fully performed; pt attempted BM at end of both tx sessions, STNA to take over    Toilet Transfers:    Performed in AM and PM Toilet Transfer  Assistance Needed: Supervision or touching assistance  Comment: SBA c SPC, grab bar  CARE Score: 4  Discharge Goal: Independent  Device Used:    []   Standard Toilet         [x]   Grab Bars           []  Bedside Commode       []   Elevated Toilet          []   Other:        Bed Mobility:           [x]   Pt received out of bed       Transfers:    Sit--> Stand:  SBA  Stand --> Sit:   SBA  Stand-Pivot:   SBA  Other:    Assistive device required for transfer:   [employer]       Functional Mobility:    Assistance:  AM: SBA ~20 ft x2 within room c SPC, very hesitant, at times reaching for environmental supports. In PM: SBA ~75 ft x 2 c SPC, then to increase BUE endurance propelled W/C ~100 ft x2  Device:   []   Rolling Walker     []   Standard Walker [x]   Wheelchair        [x]   Skippy Jest       []   4-Wheeled Walker         []   Cardiac Walker       []   Other:             Additional Therapeutic activities/exercises completed this date:     [x]   ADL Training   [x]   Balance/Postural training: Pt stood x 11.25 min with CGA while completing dynamic stand task while reaching outside base of support to facilitate increased balance for ADL tasks and transfers. Pt stood on blue stability  for increased balance challenge.   Majority of time relied on 1 UE support, however last 1.5 mins able to maintain c 0 UE support. [x]   Bed/Transfer Training   [x]   Endurance Training   []   Neuromuscular Re-ed   []   Nu-step:  Time:        Level:         #Steps:       []   Rebounder:    []  Seated     []  Standing        []   Supine Ther Ex (reps/sets):     [x]   Seated Ther Ex (reps/sets): To increase BUE endurance for ADLs and transfers, pt completed arm ergometer on min resistance x 5 mins, 0 rest breaks    To increase BUE endurance for ADLs and transfers, pt completed 3 sets x10 reps on rickshaw c 20#        Comments: All intervention performed to increase pt's endurance, ax tolerance, balance, and I c ADLs/IADLs and functional transfers/mobility. Patient/Caregiver Education and Training:   []   [employer] Equipment Use  [x]   Bed Mobility/Transfer Technique/Safety  []   Energy Conservation Tips  []   Family training  [x]   Postural Awareness  [x]   Safety During Functional Activities  []   Reinforced Patient's Precautions   []   Progress was updated and reviewed in Rehabtracker with patient and/or family this         date. Treatment Plan for Next Session: Continue OT POC    Assessment: This pt demonstrated a positive  response to today's treatment as evidenced by good engagement in tx session. The patient is making progress toward established goals as evidenced by QI scores. Ongoing deficits are observed in the areas of balance, endurance and continued focus on this is recommended.        Treatment/Activity Tolerance:   [x] Tolerated treatment with no adverse effects    [] Patient limited by fatigue  [] Patient limited by pain   [] Patient limited by medical complications:    [] Adverse reaction to Tx:   [] Significant change in status    Safety:       []  bed alarm set    [x]  chair alarm set    []  Pt refused alarms                []  Telesitter activated      [x]  Gait belt used during tx session      []other:       Number of Minutes/Billable Intervention  Therapeutic Exercise 45   ADL Self-care 45   Neuro Re-Ed    Therapeutic Activity 30   Group    Other:    TOTAL 120       Social History  Social/Functional History  Lives With: Spouse  Type of Home: House  Home Layout: One level, Laundry in basement  Home Access: Stairs to enter without rails  Entrance Stairs - Number of Steps: 3  Bathroom Shower/Tub: Tub/Shower unit  Bathroom Toilet: Standard  Bathroom Accessibility: Walker accessible  Home Equipment: Cane (Cane available, pt was not using AD PTA)  ADL Assistance: Independent  Homemaking Assistance: Independent  Homemaking Responsibilities: Yes  Ambulation Assistance: Independent  Transfer Assistance: Independent  Active : Yes  Mode of Transportation: Truck, Car  Education: Master's degree - political science  Occupation: Full time employment  Type of occupation:   Leisure & Hobbies: Reading, watching TV, working out at [address]: Pt manages own medications with pill box. Additional Comments: Pt sleeps on flat bed at baseline. Pt reports no falls in the past 6 months. Objective                                                                                    Goals:  (Update in navigator)  Short term goals  Time Frame for Short term goals: STG=LTG:  Long term goals  Time Frame for Long term goals : 5-7 days or until discharge  Long term goal 1: Pt will perform all grooming tasks with Mod I.  Long term goal 2: Pt will perform UB dressing with Mod I.  Long term goal 3: Pt will perform LB dressing with Mod I.  Long term goal 4: Pt will don/doff footwear with Mod I.  Long term goal 5: Pt will perform bathing with Mod I.  Long term goals 6: Pt will perform toileting with Mod I.  Long term goal 7: Pt will perform all fxl transfers (toilet, shower, etc.) with Mod I, DME PRN.   Long term goal 8: Pt will participate in therex/theract with emphasis on dynamic stance >10-12 min to increase endurance and dynamic standing tolerance needed for ADLs/IADLs:        Plan of Care Times per week: 5 days per week for a minimum of 60 minutes/day plus group as appropriate for 60 minutes. Treatment to include Plan  Times per week: 5x  Times per day: Daily  Plan weeks: 5-7 days or until discharge  Current Treatment Recommendations: Strengthening, Functional Mobility Training, Gait Training, Patient/Caregiver Education & Training, Home Management Training, Endurance Training, Stair training, Pain Management, Equipment Evaluation, Education, & procurement, Balance Training, Safety Education & Training, Self-Care / ADL    Electronically signed by   Danial Melton MS, OTR/L  License #[id number]  11/1/2021, 4:07 PM

## 2021-11-01 NOTE — PROGRESS NOTES
Physical Therapy  . [x] daily progress note       [] discharge       Patient Name:  Cheryl Sams   :  1945 MRN: [id number]  Room:  [address] [place], [place] [address] Date of Admission: 10/29/2021  Rehabilitation Diagnosis:   Critical illness myopathy [G72.81]  Critical illness myopathy [G72.81]       Date 2021       Day of ARU Week:  4   Time IN//1032   Individual Tx Minutes 60   Group Tx Minutes    Co-Treat Minutes    Concurrent Tx Minutes    TOTAL Tx Time Mins 60   Variance Time    Variance Time []   Refusal due to:     []   Medical hold/reason:    []   Illness   []   Off Unit for test/procedure  []   Extra time needed to complete task  []   Therapeutic need  []   Other (specify):   Restrictions Restrictions/Precautions  Restrictions/Precautions: General Precautions, Fall Risk  Required Braces or Orthoses?: No      Interdisciplinary communication [x]   Cleared for therapy per nursing     []   RN notified about issues during session  []   RN updated on pt performance  []   Spoke with   []   Spoke with OT  []   Spoke with MD  []   Other:    Subjective observations and cognitive status: Pt in bathroom upon entering. Agreeable to therapy.     Pain level/location:  3  /10       Location:abdomen    Discharge recommendations  Anticipated discharge date:  tbd  Destination: []home alone   []home alone with assist PRN     [x] home w/ family but wife works during day     [] Continuous supervision  []SNF    [] Assisted living     [] Other:  Continued therapy: [x]HHC PT  []OUTPATIENT PT   [] No Further PT  []SNF PT  Caregiver training recommended: []Yes  [] No   Equipment needs: possible 2ww     PT IRF-MAK scores and goals for discharge assessment:       Sit to Stand  Assistance Needed: Independent  CARE Score: 6  Discharge Goal: Independent    Chair/Bed-to-Chair Transfer  Assistance Needed: Supervision or touching assistance  Comment: SBA, pt unsteady, very slow  CARE Score: 4  Discharge Goal: Independent            Walk 10 Feet?   Walk 10 Feet?: Yes    1 Step  1 Step?: Yes    Picking Up Object  Assistance Needed: Supervision or touching assistance  Physical Assistance Level: No physical assistance  CARE Score: 4  Discharge Goal: Independent    Wheelchair Ability  Uses a Wheelchair and/or Scooter?: No                        Walking Ability  Does the Patient Walk?: Yes    Walk 10 Feet  Assistance Needed: Supervision or touching assistance  Physical Assistance Level: No physical assistance  Comment: very slow, pt unsure of self with 2ww  CARE Score: 4  Discharge Goal: Independent    Walk 50 Feet with Two Turns  Assistance Needed: Supervision or touching assistance  Physical Assistance Level: No physical assistance  Comment: slow gait with 2ww, decreased step height, narrow SANJEEV  CARE Score: 4  Discharge Goal: Independent    Walk 150 Feet  Comment: Pt not able to ambulate this distance today due to decreased activity tolerance  Reason if not Attempted: Not attempted due to medical condition or safety concerns  CARE Score: 88  Discharge Goal: Independent        1 Step (Curb)  Assistance Needed: Supervision or touching assistance  Comment: CG for steadying  CARE Score: 4  Discharge Goal: Independent    4 Steps  Assistance Needed: Supervision or touching assistance  Comment: SBA with cane and one rail, step-to pattern, mod fatigue  CARE Score: 4  Discharge Goal: Independent    12 Steps  Comment: unable to tolerate 12 steps due to decreased activiy tolerance today  Reason if not Attempted: Not attempted due to medical condition or safety concerns  CARE Score: 88  Discharge Goal: Independent      Additional Therapeutic activities/exercises completed this date:     []   Nu-step:  Time:        Level:         #Steps:       []   Rebounder:    []  Seated     []  Standing        []   Balance training         []   Postural training    []   Supine ther ex (reps/sets):     []   Seated ther ex (reps/sets):     []   Standing ther ex (reps/sets):     []   Other: Toileting activity completed with    []   Other:    Comments:      Patient/Caregiver Education and Training:   []   Role of PT  [x]   Education about Dx  [x]   Use of call light for assist   []   HEP provided and explained   [x]   Treatment plan reviewed  []   Home safety  []   Wheelchair mobility/management   []   Body mechanics  [x]   Bed Mobility/Transfer technique  [x]   Gait technique/sequencing  [x]   Proper use of assistive device/adaptive equipment  [x]   Stair training/Advanced mobility safety and technique  []   Reinforced patient's precautions/mobility while maintaining precautions  []   Postural awareness  []   Family/caregiver training  []   Progress was updated and reviewed in Rehabtracker with patient and/or family this date. []   Other:    Treatment Plan for Next Session: continue to train with cane, LE ex, activity tolerance     Assessment:  Pt not performing at levels [facility] at [facility]. Pt is quite fatigued and moves well below normal household ambulation pace and is unsure of his steps although not showing LOB. Pt needs to be fully independent to return home as he will be alone during the day.      Treatment/Activity Tolerance:   [] Tolerated treatment with no adverse effects    [x] Patient limited by fatigue  [] Patient limited by pain   [] Patient limited by medical complications:    [] Adverse reaction to Tx:   [] Significant change in status    Safety:       []  bed alarm set    [x]  chair alarm set    []  Pt refused alarms                []  Telesitter activated      [x]  Gait belt used during tx session      []other:       Number of Minutes/Billable Intervention  Gait Training 30   Therapeutic Exercise    Neuro Re-Ed    Therapeutic Activity 30   Wheelchair Propulsion    Group    Other:    TOTAL 60     Social History  Social/Functional History  Lives With: Spouse  Type of Home: House  Home Layout: One level, Laundry in basement  Home Access: Stairs to enter without rails  Entrance Stairs - Number of Steps: 3  Bathroom Shower/Tub: Tub/Shower unit  Bathroom Toilet: Standard  Bathroom Accessibility: Walker accessible  Home Equipment: Cane (Cane available, pt was not using AD PTA)  ADL Assistance: Independent  Homemaking Assistance: Independent  Homemaking Responsibilities: Yes  Ambulation Assistance: Independent  Transfer Assistance: Independent  Active : Yes  Mode of Transportation: Truck, Car  Education: Master's degree - political science  Occupation: Full time employment  Type of occupation:   Leisure & Hobbies: Reading, watching TV, working out at [employer] 3x/week  IADL Comments: Pt manages own medications with pill box. Additional Comments: Pt sleeps on flat bed at baseline. Pt reports no falls in the past 6 months. Objective                                                                                    Goals:  (Update in navigator)  Short term goals  Time Frame for Short term goals: about one week  Short term goal 1: indep all bed mobility and bed xfers  Short term goal 2: indep all basic sit-stand xfers from bed/chair/car, supervision for floor xfer  Short term goal 3: amb 150 feet without device, with good balance, independently  Short term goal 4: up and down 12 stairs with modified independence  Short term goal 5: indep with HEP for general conditioning and endurance.:  Long term goals  Time Frame for Long term goals : anticipate 7 days.:        Plan of Care                                                                              Times per week: 5 days per week for a minimum of 60 minutes/day plus group as appropriate for 60 minutes.   Treatment to include      Electronically signed by   Minnie Oneil PT,   11/1/2021, 10:59 AM

## 2021-11-01 NOTE — PROGRESS NOTES
[place]    : 1945  Acct #: [de-identified]  MRN: [id number]              PM&R Progress Note      Admitting diagnosis: Critical illness myopathy ( [address])     Comorbid diagnoses impacting rehabilitation: Generalized weakness, gait disturbance, uncontrolled pain, prostate cancer with radical prostatectomy on 10/11/2021, small bowel perforation with resection on 10/16/2021, paroxysmal atrial fibrillation, acute kidney injury, essential hypertension, diverticulitis, history of DVT    Chief complaint: Some nausea with poor appetite and constipation. No abdominal cramping or sharp pain. No chills. Prior (baseline) level of function: Independent. Current level of function:         Current  IRF-MAK and Goals:   Occupational Therapy:    Short term goals  Time Frame for Short term goals: STG=LTG :   Long term goals  Time Frame for Long term goals : 5-7 days or until discharge  Long term goal 1: Pt will perform all grooming tasks with Mod I.  Long term goal 2: Pt will perform UB dressing with Mod I.  Long term goal 3: Pt will perform LB dressing with Mod I.  Long term goal 4: Pt will don/doff footwear with Mod I.  Long term goal 5: Pt will perform bathing with Mod I.  Long term goals 6: Pt will perform toileting with Mod I.  Long term goal 7: Pt will perform all fxl transfers (toilet, shower, etc.) with Mod I, DME PRN.   Long term goal 8: Pt will participate in therex/theract with emphasis on dynamic stance >10-12 min to increase endurance and dynamic standing tolerance needed for ADLs/IADLs :                                       Eating: Eating  Assistance Needed: Independent  Comment: Open and eat applesauce with Ind.  CARE Score: 6       Oral Hygiene: Oral Hygiene  Assistance Needed: Supervision or touching assistance  Comment: in stance at sink  CARE Score: 4  Discharge Goal: Independent    UB/LB Bathing: Shower/Bathe Self  Assistance Needed: Supervision or touching assistance  Comment: supervision for full shower seated  CARE Score: 4  Discharge Goal: Independent    UB Dressing: Upper Body Dressing  Assistance Needed: Supervision or touching assistance  Comment: retrieved clothing from closet with SBA  CARE Score: 4  Discharge Goal: Independent         LB Dressing: Lower Body Dressing  Assistance Needed: Supervision or touching assistance  Comment: SBA to doff/don Depends and pants  CARE Score: 4  Discharge Goal: Independent    Donning and [place] Footwear: Putting On/Taking Off Footwear  Assistance Needed: Setup or clean-up assistance  Comment: to doff/don personal slip on shoes  CARE Score: 5  Discharge Goal: Independent      Toileting: Toileting Hygiene  Comment: Pt performed toileting during PT session. Reason if not Attempted: Patient refused  CARE Score: 7  Discharge Goal: Independent      Toilet Transfers: Toilet Transfer  Assistance Needed: Supervision or touching assistance  Comment: SBA c SPC, grab bar  CARE Score: 4  Discharge Goal: Independent    Physical Therapy:   Short term goals  Time Frame for Short term goals: about one week  Short term goal 1: indep all bed mobility and bed xfers  Short term goal 2: indep all basic sit-stand xfers from bed/chair/car, supervision for floor xfer  Short term goal 3: amb 150 feet without device, with good balance, independently  Short term goal 4: up and down 12 stairs with modified independence  Short term goal 5: indep with HEP for general conditioning and endurance. Long term goals  Time Frame for Long term goals : anticipate 7 days. Bed Mobility:   Sit to Lying  Assistance Needed: Independent  Comment: no rails, flat bed, efficient, safe.   trial from each side of bed  CARE Score: 6  Discharge Goal: Independent  Roll Left and Right  Assistance Needed: Independent  Comment: flat bed, no rails, moved well to each side, safely  CARE Score: 6  Discharge Goal: Independent  Lying to Sitting on Side of Bed  Assistance Needed: [address] [facility] Score: 4  Discharge Goal: Independent     1 Step (Curb)  Assistance Needed: Supervision or touching assistance  Physical Assistance Level: No physical assistance  Comment: CG for steadying  CARE Score: 4  Discharge Goal: Independent     4 Steps  Assistance Needed: Supervision or touching assistance  Physical Assistance Level: No physical assistance  Comment: SBA with cane and one rail, step-to pattern, mod fatigue  CARE Score: 4  Discharge Goal: Independent     12 Steps  Assistance Needed: Supervision or touching assistance  Physical Assistance Level: No physical assistance  Comment: unable to tolerate 12 steps due to decreased activiy tolerance today  Reason if not Attempted: Not attempted due to medical condition or safety concerns  CARE Score: 88  Discharge Goal: Independent       Wheelchair:  w/c Ability: Wheelchair Ability  Uses a Wheelchair and/or Scooter?: No                Balance:        Object: Picking Up Object  Assistance Needed: Supervision or touching assistance  Physical Assistance Level: No physical assistance  CARE Score: 4  Discharge Goal: Independent    I      Exam:    Blood pressure (!) 140/98, pulse 95, temperature 98.4 °F (36.9 °C), temperature source Oral, resp. rate 18, height 6' 1\" (1.854 m), weight 205 lb 14.6 oz (93.4 kg), SpO2 99 %. General: Observed sitting up in wheelchair. Simple conversation. In no distress. HEENT: Mucous membranes are little dry. Neck supple. Full visual field. Pulmonary: Unlabored and clear. Cardiac: Infrequent premature beats. The rate is controlled. Abdomen: Patient's abdomen is soft and minimally distended. Bowel sounds were faint but present throughout. There was no rebound, guarding or masses noted. Surgical incisions are well-healed and his drain sites are drying. Upper extremities: Able to bring both hands up to meet mine. No reflexes but strength is functional.    Lower extremities: Trace edema at the calves. Heels clear.  4-/5 strength across the knees and ankles. 4/5 strength at the hips. Sitting balance was good. Standing balance was poor. Lab Results   Component Value Date    WBC 6.6 10/31/2021    HGB 9.5 (L) 10/31/2021    HCT 29.9 (L) 10/31/2021    MCV 91.2 10/31/2021     10/31/2021     Lab Results   Component Value Date    INR 1.11 08/17/2021    INR 1.15 05/05/2021    INR 1.20 10/14/2017    PROTIME 14.3 08/17/2021    PROTIME 13.9 05/05/2021    PROTIME 13.7 (H) 10/14/2017     Lab Results   Component Value Date    CREATININE 0.7 (L) 10/31/2021    BUN 10 10/31/2021     (L) 10/31/2021    K 4.2 10/31/2021     10/31/2021    CO2 24 10/31/2021     Lab Results   Component Value Date    ALT 26 05/06/2021    AST 24 05/06/2021    ALKPHOS 51 05/06/2021    BILITOT 0.8 05/06/2021       Expected length of stay  prior to a supervised level of function for discharge home with a walker and [facility] OT/PT is 2 weeks. Recommendations:    1. Critical illness myopathy with gait disturbance: Developing the routine for his daily occupational and physical therapy. Anticipating adaptive equipment training, caregiver education and bowel and bladder retraining. Monitoring his wounds for signs of infection, none noted today. He needs nutritional support and has been placed on a general diet. Tolerating the DVT prophylaxis and cautious pain management. Must maximize treatment of his atrial fibrillation, hypertension and recent kidney injury. Complains of nausea and constipation. Some flatus is present. Verbal cues and moderate physical assistance for transfers today. 2. DVT prophylaxis: Lovenox 40 mg subcu daily. I must monitor his hemoglobin and platelet count periodically while on this medication. Weightbearing activities are pursued daily. GI prophylaxis is available. No clinical signs of acute blood loss. 3. Uncontrolled pain: Acetaminophen, muscle relaxants and oxycodone as needed. Bowel intervention while on the narcotics. Progressive mobilization. 4. Radical prostatectomy: Patient is on Flomax. So far he is demonstrated steady urinary output without dysuria. Urology follow-up as an outpatient. 5. Small bowel perforation with moderate protein calorie malnutrition: Monitoring his drain sites and wound for ongoing healing. Increasing his oral intake. Outpatient follow-up with general surgery. 6. Paroxysmal atrial fibrillation: Coreg for rate control. Daily weights do not reveal any decompensation of CHF. Vital signs are checked at rest and with activity. 7.   Hypertension: His Coreg also helps manage his systolic blood pressure. Target systolic blood pressure ranges 120-140. Vital signs are checked at rest and with activity. 8. History of DVT: Progressive mobilization. Lovenox for DVT prophylaxis. No current signs of thrombosis.

## 2021-11-02 PROCEDURE — 97530 THERAPEUTIC ACTIVITIES: CPT

## 2021-11-02 PROCEDURE — 6370000000 HC RX 637 (ALT 250 FOR IP): Performed by: PHYSICAL MEDICINE & REHABILITATION

## 2021-11-02 PROCEDURE — 94150 VITAL CAPACITY TEST: CPT

## 2021-11-02 PROCEDURE — 97110 THERAPEUTIC EXERCISES: CPT

## 2021-11-02 PROCEDURE — 6360000002 HC RX W HCPCS: Performed by: PHYSICAL MEDICINE & REHABILITATION

## 2021-11-02 PROCEDURE — 97116 GAIT TRAINING THERAPY: CPT

## 2021-11-02 PROCEDURE — 99233 SBSQ HOSP IP/OBS HIGH 50: CPT | Performed by: PHYSICAL MEDICINE & REHABILITATION

## 2021-11-02 PROCEDURE — 1280000000 HC REHAB R&B

## 2021-11-02 PROCEDURE — 94761 N-INVAS EAR/PLS OXIMETRY MLT: CPT

## 2021-11-02 RX ADMIN — ASPIRIN 81 MG: 81 TABLET, CHEWABLE ORAL at 09:12

## 2021-11-02 RX ADMIN — CARVEDILOL 3.12 MG: 6.25 TABLET, FILM COATED ORAL at 09:11

## 2021-11-02 RX ADMIN — ACETAMINOPHEN 650 MG: 325 TABLET ORAL at 17:06

## 2021-11-02 RX ADMIN — CLOPIDOGREL BISULFATE 75 MG: 75 TABLET ORAL at 09:12

## 2021-11-02 RX ADMIN — CARVEDILOL 3.12 MG: 6.25 TABLET, FILM COATED ORAL at 17:04

## 2021-11-02 RX ADMIN — DOCUSATE SODIUM 100 MG: 100 CAPSULE ORAL at 09:12

## 2021-11-02 RX ADMIN — ACETAMINOPHEN 650 MG: 325 TABLET ORAL at 21:16

## 2021-11-02 RX ADMIN — TAMSULOSIN HYDROCHLORIDE 0.4 MG: 0.4 CAPSULE ORAL at 09:12

## 2021-11-02 RX ADMIN — ATORVASTATIN CALCIUM 80 MG: 40 TABLET, FILM COATED ORAL at 21:16

## 2021-11-02 ASSESSMENT — PAIN DESCRIPTION - PAIN TYPE
TYPE: ACUTE PAIN
TYPE: ACUTE PAIN

## 2021-11-02 ASSESSMENT — PAIN DESCRIPTION - LOCATION
LOCATION: BACK
LOCATION: BACK

## 2021-11-02 ASSESSMENT — PAIN SCALES - GENERAL
PAINLEVEL_OUTOF10: 0
PAINLEVEL_OUTOF10: 3
PAINLEVEL_OUTOF10: 0
PAINLEVEL_OUTOF10: 6
PAINLEVEL_OUTOF10: 0
PAINLEVEL_OUTOF10: 4

## 2021-11-02 ASSESSMENT — PAIN DESCRIPTION - DESCRIPTORS
DESCRIPTORS: ACHING
DESCRIPTORS: ACHING

## 2021-11-02 ASSESSMENT — PAIN DESCRIPTION - ORIENTATION
ORIENTATION: LOWER
ORIENTATION: LOWER

## 2021-11-02 ASSESSMENT — PAIN DESCRIPTION - FREQUENCY
FREQUENCY: INTERMITTENT
FREQUENCY: INTERMITTENT

## 2021-11-02 ASSESSMENT — PAIN - FUNCTIONAL ASSESSMENT
PAIN_FUNCTIONAL_ASSESSMENT: PREVENTS OR INTERFERES SOME ACTIVE ACTIVITIES AND ADLS
PAIN_FUNCTIONAL_ASSESSMENT: PREVENTS OR INTERFERES SOME ACTIVE ACTIVITIES AND ADLS

## 2021-11-02 ASSESSMENT — PAIN DESCRIPTION - ONSET
ONSET: ON-GOING
ONSET: ON-GOING

## 2021-11-02 NOTE — CARE COORDINATION
Case Management Admission Note      Patient:Luis Eden      :1945  MVO:[id number]  Rehab Dx/Hx: Critical illness myopathy [G72.81]  Critical illness myopathy [G72.81]    Chief Complaint:   Past Medical History:   Diagnosis Date    Acid reflux     Diverticulitis Last Episode In     Hx of blood clots Dx     \"Left Arm\"    Hyperlipidemia     Hypertension     Parathyroid adenoma     Prostate Cancer Dx     2 Prostate Biopsies Done, Last Done In     Teeth missing     Upper And Lower    Wears glasses     Wears partial dentures     Upper     Past Surgical History:   Procedure Laterality Date    CARDIAC CATHETERIZATION      COLONOSCOPY  Last Done In     Polyps Removed In Past    DENTAL SURGERY      Teeth Extracted In Past    [address] Right 2018    PROSTATE BIOPSY  ,     \"Prostate Cancer Dx In \"    VASECTOMY  1986     Allergies   Allergen Reactions    Sulfa Antibiotics      \"Tongue Coats\"     Precautions: falls    Date of Admit: 10/29/2021  Room #: 1020/1020-A      Current functional status at time of admit:        Home Living/DME Available:      Type of Home: House  Home Access: Stairs to enter without rails  Bathroom Shower/Tub: Tub/Shower unit  Bathroom Toilet: Standard     Home Equipment: Cane (Cane available, pt was not using AD PTA)       IADL Hx:   Homemaking Responsibilities: Yes  Active : Yes  Mode of Transportation: Truck, Car  Occupation: Full time employment  Leisure & Hobbies: Reading, watching TV, working out at [address] [place] [address].: Wife Stephenie Hinson. Family: Local friends and family. Comments: LSW met with patient for admission assessment. Patient lives with wife Stephenie Hinson in a 1-level home in [facility]. There are 3 steps to enter without rails and steps inside (to basement for laundry). Patient reports tub/shower for bathing and bedroom and bathroom are on the main level. Patient has PCP, was independent in ADLs PTA, and uses [facility] in [facility] for prescriptions. Patient states he has a cane at home and no pre-existing HHC. Patient reports access to food, utilities, and shelter and feels safe in his environment. BIMS completed in initial assessment by OT. Plan is to D/C home with wife, with Nhung Will and DME as recommended by therapy staff. F/U to be set with Dr. Clydell Buerger ([phone]) and family will transport home.     Corine Meyer, LEEANNE, LSW, 11/2/2021, 4:15 PM

## 2021-11-02 NOTE — CARE COORDINATION
LSW met with patient following Care Conference. LSW informed patient of recommendations for a RW and SC. Patient agreeable to both. LSW then informed of recommendation for [facility] PT and OT and patient is agreeable to both. Patient verbalized understanding and will choose an agency closer to discharge. D/C Plan:  Date:  Nov. 5th  DME:  MICHAEL YEAGER (Agency TBD)  HHC:  PT, OT (Agency TBD)  To:   Home with wife (family will transport)

## 2021-11-02 NOTE — PROGRESS NOTES
Occupational Therapy  Physical Rehabilitation: OCCUPATIONAL THERAPY     [x] daily progress note       [] discharge       Patient Name:  Елена Rocha   :  1945 MRN: [id number]  Room:  [address] [place] [address] Date of Admission: 10/29/2021  Rehabilitation Diagnosis:   Critical illness myopathy [G72.81]  Critical illness myopathy [G72.81]       Date 2021       Day of ARU Week:  5   Time IN/OUT 1305/1405   Individual Tx Minutes 60   Group Tx Minutes    Co-Treat Minutes    Concurrent Tx Minutes    TOTAL Tx Time Mins 60   Variance Time    Variance Time []   Refusal due to:     []   Medical hold/reason:    []   Illness   []   Off Unit for test/procedure  []   Extra time needed to complete task  []   Therapeutic need  []   Other (specify):   Restrictions Restrictions/Precautions: General Precautions, Fall Risk         Communication with other providers: [x]   OK to see per nursing:     []   Spoke with team member regarding:      Subjective observations and cognitive status: Pt in recliner on approach, wife visiting but leaving. Pleasant and agreeable to tx session     Pain level/location:    /10       Location: Denied   Discharge recommendations  Anticipated discharge date:    Destination: []home alone   []home alone w assist prn   [x] home w/ family    [] Continuous supervision       []SNF    [] Assisted living     [] Other:   Continued therapy: [x]HHC OT  []OUTPATIENT  OT   [] No Further OT  Equipment needs: Shower chair    Елена Rocha requires the assistance of a shower chair to successfully and safely complete bathing activities necessary due to reduced balance, endurance, need for ADL assist, and LE weakness and reduced ROM. These tasks cannot be safely completed without this device and would place Елена Rocha at greater risk of falls.        Toileting:   Supervision, min increased time for BM      Toilet Transfers:   SBA c SPC  Device Used:    []   Standard Toilet         [x]   Clabe Flair           []  Bedside Commode       []   Elevated Toilet          []   Other:        Bed Mobility:           [x]   Pt received out of bed   Sit --> Supine: Mod I     Transfers:    Sit--> Stand:  Supervision  Stand --> Sit:   Supervision  Stand-Pivot:  SBA  Other:    Assistive device required for transfer:   [facility]      Functional Mobility:    Assistance:   Ranged from CGA-SBA c SPC ~150 ft; when cued to perform step through pattern pt able to do so however became more unsteady and required cueing to keep cane on ground. To increase BUE endurance for ADLs and transfers pt also propelled W/C ~50 ft  Device:   []   Rolling Walker     []   Standard Walker [x]   Wheelchair        [x]   [place]       []   4-Wheeled Walker         []   Cardiac Walker       []   Other:         Additional Therapeutic activities/exercises completed this date:     []   ADL Training   [x]   Balance/Postural training: Pt stood x  6.25 + 5.25 mins at counter with SBA while completing dynamic stand task while reaching outside base of support to facilitate increased balance for ADL tasks and transfers. Pt stood on blue stability  for increased balance challenge. Pt ranged from using 0-1 UE support throughout task        [x]   Bed/Transfer Training   [x]   Endurance Training   []   Neuromuscular Re-ed   []   Nu-step:  Time:        Level:         #Steps:       []   Rebounder:    []  Seated     []  Standing        []   Supine Ther Ex (reps/sets):     []   Seated Ther Ex (reps/sets):     []   Standing Ther Ex (reps/sets):     []   Other:      Comments: All intervention performed to increase pt's endurance, ax tolerance, balance,   and I c ADLs/IADLs and functional transfers/mobility.        Patient/Caregiver Education and Training:   []   [employer] Equipment Use  [x]   Bed Mobility/Transfer Technique/Safety  []   Energy Conservation Tips  []   Family training  [x]   Postural Awareness  [x]   Safety During Functional Activities  []   Reinforced Patient's Precautions   [] box.  Additional Comments: Pt sleeps on flat bed at baseline. Pt reports no falls in the past 6 months. Objective                                                                                    Goals:  (Update in navigator)  Short term goals  Time Frame for Short term goals: STG=LTG:  Long term goals  Time Frame for Long term goals : 5-7 days or until discharge  Long term goal 1: Pt will perform all grooming tasks with Mod I.  Long term goal 2: Pt will perform UB dressing with Mod I.  Long term goal 3: Pt will perform LB dressing with Mod I.  Long term goal 4: Pt will don/doff footwear with Mod I.  Long term goal 5: Pt will perform bathing with Mod I.  Long term goals 6: Pt will perform toileting with Mod I.  Long term goal 7: Pt will perform all fxl transfers (toilet, shower, etc.) with Mod I, DME PRN. Long term goal 8: Pt will participate in therex/theract with emphasis on dynamic stance >10-12 min to increase endurance and dynamic standing tolerance needed for ADLs/IADLs:        Plan of Care                                                                              Times per week: 5 days per week for a minimum of 60 minutes/day plus group as appropriate for 60 minutes. Treatment to include Plan  Times per week: 5x  Times per day: Daily  Plan weeks: 5-7 days or until discharge  Current Treatment Recommendations: Strengthening, Functional Mobility Training, Gait Training, Patient/Caregiver Education & Training, Home Management Training, Endurance Training, Stair training, Pain Management, Equipment Evaluation, Education, & procurement, Balance Training, Safety Education & Training, Self-Care / ADL    Electronically signed by   Roosevelt Yu MS, OTR/L  License #[id number]  11/2/2021, 1:22 PM

## 2021-11-02 NOTE — PROGRESS NOTES
Occupational Therapy  Physical Rehabilitation: OCCUPATIONAL THERAPY     [x] daily progress note       [] discharge       Patient Name:  Елена Rocha   :  1945 MRN: [id number]  Room:  [address] [place] [address] Date of Admission: 10/29/2021  Rehabilitation Diagnosis:   Critical illness myopathy [G72.81]  Critical illness myopathy [G72.81]       Date 2021       Day of ARU Week:  5   Time IN/OUT 8119-9879   Individual Tx Minutes 60   Group Tx Minutes    Co-Treat Minutes    Concurrent Tx Minutes    TOTAL Tx Time Mins 60   Variance Time    Variance Time []   Refusal due to:     []   Medical hold/reason:    []   Illness   []   Off Unit for test/procedure  []   Extra time needed to complete task  []   Therapeutic need  []   Other (specify):   Restrictions Restrictions/Precautions: General Precautions, Fall Risk         Communication with other providers: [x]   OK to see per nursing:     []   Spoke with team member regarding:      Subjective observations and cognitive status: Pt sitting EOB but laying back on pillows on approach. Pt pleasant and agreeable to therapy session.     Pain level/location:    /10       Location: none    Discharge recommendations  Anticipated discharge date:  TBD  Destination: []home alone   []home alone w assist prn   [] home w/ family    [] Continuous supervision       []SNF    [] Assisted living     [] Other:   Continued therapy: []HHC OT  []OUTPATIENT  OT   [] No Further OT  Equipment needs: TBD      Toileting:   Declined need       Toilet Transfers:   NA   Device Used:    []   Standard Toilet         []   Grab Bars           []  Bedside Commode       []   Elevated Toilet          []   Other:        Bed Mobility:           []   Pt received out of bed       Transfers:    Sit--> Stand:  SBA  Stand --> Sit:   SBA  Stand-Pivot:   SBA   Other:    Assistive device required for transfer:   RW      Functional Mobility:  197 ft c RW at SBA + 146 ft c WC using UEs to increase strength for improved ADLs Assistance:  SBA  Device:   [x]   Rolling Walker     []   Standard Elverna Corti [x]   Wheelchair        []   [employer]       []   4-Wheeled Elverna Corti         []   Cardiac Walker       []   Other:          Additional Therapeutic activities/exercises completed this date:     []   ADL Training   [x]   Balance/Postural training: Pt stood x 8 min with SBA/Supervision while completing dynamic stand task while reaching outside base of support to facilitate increased balance for ADL tasks and transfers. [x]   Bed/Transfer Training   [x]   Endurance Training: patient instructed in bilateral reciprocal patterned movement with min resistance while seated for 12 minutes with 1 rest breaks to increase endurance/activity tolerance for facilitation of both ADL and mobility tasks   []   Neuromuscular Re-ed   []   Nu-step:  Time:        Level:         #Steps:       []   Rebounder:    []  Seated     []  Standing        []   Supine Ther Ex (reps/sets):     []   Seated Ther Ex (reps/sets):     []   Standing Ther Ex (reps/sets):     []   Other:      Comments:      Patient/Caregiver Education and Training:   []   [employer] Equipment Use  []   Bed Mobility/Transfer Technique/Safety  []   Energy Conservation Tips  []   Family training  []   Postural Awareness  []   Safety During Functional Activities  []   Reinforced Patient's Precautions   []   Progress was updated and reviewed in Rehabtracker with patient and/or family this         date. Treatment Plan for Next Session: Continue OT POC     Assessment: This pt demonstrated a positive response to today's treatment as evidenced by actively engaged in therapy session. The patient is making progress toward established goals as evidenced by QI scores. Ongoing deficits are observed in the areas of UB strength, balance and endurance and continued focus on this is recommended.        Treatment/Activity Tolerance:   [x] Tolerated treatment with no adverse effects    [] Patient limited by fatigue  [] Patient limited by pain   [] Patient limited by medical complications:    [] Adverse reaction to Tx:   [] Significant change in status    Safety:       []  bed alarm set    [x]  chair alarm set    []  Pt refused alarms                []  Telesitter activated      [x]  Gait belt used during tx session      []other:       Number of Minutes/Billable Intervention  Therapeutic Exercise 25   ADL Self-care    Neuro Re-Ed    Therapeutic Activity 35   Group    Other:    TOTAL 60       Social History  Social/Functional History  Lives With: Spouse  Type of Home: House  Home Layout: One level, Laundry in basement  Home Access: Stairs to enter without rails  Entrance Stairs - Number of Steps: 3  Bathroom Shower/Tub: Tub/Shower unit  Bathroom Toilet: Standard  Bathroom Accessibility: Walker accessible  Home Equipment: Cane (Cane available, pt was not using AD PTA)  ADL Assistance: Independent  Homemaking Assistance: Independent  Homemaking Responsibilities: Yes  Ambulation Assistance: Independent  Transfer Assistance: Independent  Active : Yes  Mode of Transportation: Truck, Car  Education: Master's degree - political science  Occupation: Full time employment  Type of occupation:   Leisure & Hobbies: Reading, watching TV, working out at [address] [place] [address] Road: Pt manages own medications with pill box. Additional Comments: Pt sleeps on flat bed at baseline. Pt reports no falls in the past 6 months.     Objective                                                                                    Goals:  (Update in navigator)  Short term goals  Time Frame for Short term goals: STG=LTG:  Long term goals  Time Frame for Long term goals : 5-7 days or until discharge  Long term goal 1: Pt will perform all grooming tasks with Mod I.  Long term goal 2: Pt will perform UB dressing with Mod I.  Long term goal 3: Pt will perform LB dressing with Mod I.  Long term goal 4: Pt will don/doff footwear with Mod I.  Long term goal 5: Pt will perform bathing with Mod I.  Long term goals 6: Pt will perform toileting with Mod I.  Long term goal 7: Pt will perform all fxl transfers (toilet, shower, etc.) with Mod I, DME PRN. Long term goal 8: Pt will participate in therex/theract with emphasis on dynamic stance >10-12 min to increase endurance and dynamic standing tolerance needed for ADLs/IADLs:        Plan of Care                                                                              Times per week: 5 days per week for a minimum of 60 minutes/day plus group as appropriate for 60 minutes.   Treatment to include Plan  Times per week: 5x  Times per day: Daily  Plan weeks: 5-7 days or until discharge  Current Treatment Recommendations: Strengthening, Functional Mobility Training, Gait Training, Patient/Caregiver Education & Training, Home Management Training, Endurance Training, Stair training, Pain Management, Equipment Evaluation, Education, & procurement, Balance Training, Safety Education & Training, Self-Care / ADL    Electronically signed by   SPENCER Simmons,  11/2/2021, 11:15 AM

## 2021-11-02 NOTE — PROGRESS NOTES
therapy: [x]HHC PT  []OUTPATIENT  PT   [] No Further PT  []SNF PT  Caregiver training recommended: []Yes  [] No   Equipment needs: Teresa Zamora requires the assistance of a wheeled walker to successfully ambulate from room to room at home to allow completion of daily living tasks such as: bathing, toileting, dressing and grooming. A wheeled walker is necessary due to the patient's unsteady gait, upper body weakness, inability to  a standard walker. This patient can ambulate only by pushing a walker instead of using a lesser assistive device such as a cane or crutch. Bed Mobility:           []   Pt received out of bed   Rolling R/L:  Mod I  Scooting: Mod I, slow  Lying --> Sit:  Mod I  Sit --> lying: Mod I  Bed features used: [x] Yes  [] No       Transfers:    Sit--> Stand:  CGA with initial stand from bed very unsteady  Stand --> Sit:   SBA  Chair-->Bed/Bed --> Chair:  Varied SBA to CG during session  Toilet Transfer (if applicable): SBA      Gait:    Distance:  15'x2,   Assistance:  CGA with cane one rep, SBA with 2ww second rep  Gait Quality:  With cane moderate path deviation, discontinuous steps    Wheelchair Propulsion:  Distance:  200'   Assistance:  suprvision with max time allowed  Extremities Used:  BUE  Type:    [x]  Manual        []  Electric    Stairs   # Completed:  3-4\", 2-6\" consecutive  Assistance:  CGA  Supportive Device:  Straight cane with step-to pattern.  Pt with HR of 124 upon completion      Additional Therapeutic activities/exercises completed this date:     []   Nu-step:  Time:        Level:         #Steps:       []   Rebounder:    []  Seated     []  Standing        []   Balance training         []   Postural training    []   Supine ther ex (reps/sets):     []   Seated ther ex (reps/sets):     []   Standing ther ex (reps/sets):     [x]   Picking up object from floor (standing): SBA                  [x]   Reacher used   [x]   Other:Education in energy conservation including deficit  []   Attention  []   Other:      Safety:       [x]  bed alarm set    []  chair alarm set    []  Pt refused alarms                []  Telesitter activated      [x]  Gait belt used during tx session      []other:         Number of Minutes/Billable Intervention  Gait Training 15   Therapeutic Exercise 10   Neuro Re-Ed    Therapeutic Activity 35   Wheelchair Propulsion    Group    Other:    TOTAL 60         Social History  Social/Functional History  Lives With: Spouse  Type of Home: House  Home Layout: One level, Laundry in basement  Home Access: Stairs to enter without rails  Entrance Stairs - Number of Steps: 3  Bathroom Shower/Tub: Tub/Shower unit  Bathroom Toilet: Standard  Bathroom Accessibility: Walker accessible  Home Equipment: Cane (Cane available, pt was not using AD PTA)  ADL Assistance: Independent  Homemaking Assistance: Independent  Homemaking Responsibilities: Yes  Ambulation Assistance: Independent  Transfer Assistance: Independent  Active : Yes  Mode of Transportation: Truck, Car  Education: Master's degree - political science  Occupation: Full time employment  Type of occupation:   Leisure & Hobbies: Reading, watching TV, working out at [address] [place] [address] Road: Pt manages own medications with pill box. Additional Comments: Pt sleeps on flat bed at baseline. Pt reports no falls in the past 6 months.     Objective                                                                                    Goals:  (Update in navigator)  Short term goals  Time Frame for Short term goals: about one week  Short term goal 1: indep all bed mobility and bed xfers  Short term goal 2: indep all basic sit-stand xfers from bed/chair/car, supervision for floor xfer  Short term goal 3: amb 150 feet without device, with good balance, independently  Short term goal 4: up and down 12 stairs with modified independence  Short term goal 5: indep with HEP for general conditioning and endurance.:  Long term goals  Time Frame for Long term goals : anticipate 7 days.:        Plan of Care                                                                              Times per week: 5 days per week for a minimum of 60 minutes/day plus group as appropriate for 60 minutes.   Treatment to include      Electronically signed by   Simone Nagel PT,  11/2/2021, 3:01 PM

## 2021-11-03 PROCEDURE — 97110 THERAPEUTIC EXERCISES: CPT

## 2021-11-03 PROCEDURE — 99232 SBSQ HOSP IP/OBS MODERATE 35: CPT | Performed by: PHYSICAL MEDICINE & REHABILITATION

## 2021-11-03 PROCEDURE — 6370000000 HC RX 637 (ALT 250 FOR IP): Performed by: PHYSICAL MEDICINE & REHABILITATION

## 2021-11-03 PROCEDURE — 94761 N-INVAS EAR/PLS OXIMETRY MLT: CPT

## 2021-11-03 PROCEDURE — 97530 THERAPEUTIC ACTIVITIES: CPT

## 2021-11-03 PROCEDURE — 6360000002 HC RX W HCPCS: Performed by: PHYSICAL MEDICINE & REHABILITATION

## 2021-11-03 PROCEDURE — 97116 GAIT TRAINING THERAPY: CPT

## 2021-11-03 PROCEDURE — 1280000000 HC REHAB R&B

## 2021-11-03 PROCEDURE — 97535 SELF CARE MNGMENT TRAINING: CPT

## 2021-11-03 RX ADMIN — TAMSULOSIN HYDROCHLORIDE 0.4 MG: 0.4 CAPSULE ORAL at 09:29

## 2021-11-03 RX ADMIN — CARVEDILOL 3.12 MG: 6.25 TABLET, FILM COATED ORAL at 09:29

## 2021-11-03 RX ADMIN — CARVEDILOL 3.12 MG: 6.25 TABLET, FILM COATED ORAL at 17:14

## 2021-11-03 RX ADMIN — ATORVASTATIN CALCIUM 80 MG: 40 TABLET, FILM COATED ORAL at 20:05

## 2021-11-03 RX ADMIN — DOCUSATE SODIUM 100 MG: 100 CAPSULE ORAL at 09:29

## 2021-11-03 RX ADMIN — OXYCODONE 5 MG: 5 TABLET ORAL at 04:42

## 2021-11-03 RX ADMIN — POLYETHYLENE GLYCOL (3350) 17 G: 17 POWDER, FOR SOLUTION ORAL at 23:58

## 2021-11-03 RX ADMIN — CLOPIDOGREL BISULFATE 75 MG: 75 TABLET ORAL at 09:29

## 2021-11-03 RX ADMIN — ASPIRIN 81 MG: 81 TABLET, CHEWABLE ORAL at 09:29

## 2021-11-03 RX ADMIN — DOCUSATE SODIUM 100 MG: 100 CAPSULE ORAL at 20:05

## 2021-11-03 ASSESSMENT — PAIN - FUNCTIONAL ASSESSMENT
PAIN_FUNCTIONAL_ASSESSMENT: PREVENTS OR INTERFERES SOME ACTIVE ACTIVITIES AND ADLS
PAIN_FUNCTIONAL_ASSESSMENT: ACTIVITIES ARE NOT PREVENTED

## 2021-11-03 ASSESSMENT — PAIN DESCRIPTION - PAIN TYPE
TYPE: ACUTE PAIN
TYPE: ACUTE PAIN

## 2021-11-03 ASSESSMENT — PAIN DESCRIPTION - ORIENTATION
ORIENTATION: LOWER
ORIENTATION: LOWER

## 2021-11-03 ASSESSMENT — PAIN DESCRIPTION - DESCRIPTORS
DESCRIPTORS: ACHING
DESCRIPTORS: ACHING

## 2021-11-03 ASSESSMENT — PAIN SCALES - GENERAL
PAINLEVEL_OUTOF10: 4
PAINLEVEL_OUTOF10: 6
PAINLEVEL_OUTOF10: 0

## 2021-11-03 ASSESSMENT — PAIN DESCRIPTION - LOCATION
LOCATION: BACK
LOCATION: BACK;ABDOMEN

## 2021-11-03 ASSESSMENT — PAIN DESCRIPTION - FREQUENCY
FREQUENCY: INTERMITTENT
FREQUENCY: INTERMITTENT

## 2021-11-03 ASSESSMENT — PAIN DESCRIPTION - ONSET
ONSET: ON-GOING
ONSET: ON-GOING

## 2021-11-03 NOTE — PLAN OF CARE
Problem: Falls - Risk of:  Goal: Will remain free from falls  Description: Will remain free from falls  11/3/2021 1138 by Lizett Cruz RN  Outcome: Ongoing  11/2/2021 2223 by Susi Calderon RN  Outcome: Ongoing  Goal: Absence of physical injury  Description: Absence of physical injury  11/3/2021 1138 by Lizett Cruz RN  Outcome: Ongoing  11/2/2021 2223 by Susi Calderon RN  Outcome: Ongoing     Problem: Pain:  Goal: Pain level will decrease  Description: Pain level will decrease  11/3/2021 1138 by Lizett Cruz RN  Outcome: Ongoing  11/2/2021 2223 by Susi Calderon RN  Outcome: Ongoing  Goal: Control of acute pain  Description: Control of acute pain  11/3/2021 1138 by Lizett Cruz RN  Outcome: Ongoing  11/2/2021 2223 by Susi Calderon RN  Outcome: Ongoing  Goal: Control of chronic pain  Description: Control of chronic pain  11/3/2021 1138 by Lizett Cruz RN  Outcome: Ongoing  11/2/2021 2223 by Susi Calderon RN  Outcome: Ongoing

## 2021-11-03 NOTE — PROGRESS NOTES
seated  CARE Score: 4  Discharge Goal: Independent    UB Dressing: Upper Body Dressing  Assistance Needed: Supervision or touching assistance  Comment: retrieved clothing from closet with SBA  CARE Score: 4  Discharge Goal: Independent         LB Dressing: Lower Body Dressing  Assistance Needed: Supervision or touching assistance  Comment: SBA to doff/don Depends and pants  CARE Score: 4  Discharge Goal: Independent    Donning and [place] Footwear: Putting On/Taking Off Footwear  Assistance Needed: Setup or clean-up assistance  Comment: to doff/don personal slip on shoes  CARE Score: 5  Discharge Goal: Independent      Toileting: Toileting Hygiene  Comment: Pt performed toileting during PT session. Reason if not Attempted: Patient refused  CARE Score: 7  Discharge Goal: Independent      Toilet Transfers: Toilet Transfer  Assistance Needed: Supervision or touching assistance  Comment: SBA c SPC, grab bar  CARE Score: 4  Discharge Goal: Independent    Physical Therapy:   Short term goals  Time Frame for Short term goals: about one week  Short term goal 1: indep all bed mobility and bed xfers  Short term goal 2: indep all basic sit-stand xfers from bed/chair/car, supervision for floor xfer  Short term goal 3: amb 150 feet without device, with good balance, independently  Short term goal 4: up and down 12 stairs with modified independence  Short term goal 5: indep with HEP for general conditioning and endurance. Long term goals  Time Frame for Long term goals : anticipate 7 days. Bed Mobility:   Sit to Lying  Assistance Needed: Independent  Comment: no rails, flat bed, efficient, safe.   trial from each side of bed  CARE Score: 6  Discharge Goal: Independent  Roll Left and Right  Assistance Needed: Independent  Comment: flat bed, no rails, moved well to each side, safely  CARE Score: 6  Discharge Goal: Independent  Lying to Sitting on Side of Bed  Assistance Needed: Independent  Physical Assistance Level: No physical assistance  Comment: no rail, safe, trial to each EOB  CARE Score: 6  Discharge Goal: Independent    Transfers:    Sit to Stand  Assistance Needed: Independent  Comment: repetitive sit-stand from EOB, repetitive sit-stand to and from chair, and modified indep to and from commode. CARE Score: 6  Discharge Goal: Independent  Chair/Bed-to-Chair Transfer  Assistance Needed: Supervision or touching assistance  Comment: SBA, pt unsteady, very slow  CARE Score: 4  Discharge Goal: Independent     Car Transfer  Assistance Needed: Independent  Comment: patient stated he rode in his/wife's car to transfer from [facility] to [facility]. patient able to open and close door. CARE Score: 6  Discharge Goal: Independent    Ambulation:    Walking Ability  Does the Patient Walk?: Yes     Walk 10 Feet  Assistance Needed: Supervision or touching assistance  Physical Assistance Level: No physical assistance  Comment: very slow, pt unsure of self with 2ww  CARE Score: 4  Discharge Goal: Independent     Walk 50 Feet with Two Turns  Assistance Needed: Supervision or touching assistance  Physical Assistance Level: No physical assistance  Comment: slow gait with 2ww, decreased step height, narrow SANJEEV  CARE Score: 4  Discharge Goal: Independent     Walk 150 Feet  Assistance Needed: Independent  Physical Assistance Level: No physical assistance  Comment: Pt not able to ambulate this distance today due to decreased activity tolerance  Reason if not Attempted: Not attempted due to medical condition or safety concerns  CARE Score: 88  Discharge Goal: Independent     Walking 10 Feet on Uneven Surfaces  Assistance Needed: Supervision or touching assistance  Physical Assistance Level: No physical assistance  Comment: patient navigated rug with objects underneath, turned onto ramp, climbed ramp, descended curb step, advanced from floor back to rug and amb over uneven surface again. 30 feet total, safely, SBA, with RW device.   CARE Score: 4  Discharge Goal: Independent     1 Step (Curb)  Assistance Needed: Supervision or touching assistance  Physical Assistance Level: No physical assistance  Comment: CG for steadying  CARE Score: 4  Discharge Goal: Independent     4 Steps  Assistance Needed: Supervision or touching assistance  Physical Assistance Level: No physical assistance  Comment: SBA with cane and one rail, step-to pattern, mod fatigue  CARE Score: 4  Discharge Goal: Independent     12 Steps  Assistance Needed: Supervision or touching assistance  Physical Assistance Level: No physical assistance  Comment: unable to tolerate 12 steps due to decreased activiy tolerance today  Reason if not Attempted: Not attempted due to medical condition or safety concerns  CARE Score: 88  Discharge Goal: Independent       Wheelchair:  w/c Ability: Wheelchair Ability  Uses a Wheelchair and/or Scooter?: No                Balance:        Object: Picking Up Object  Assistance Needed: Supervision or touching assistance  Physical Assistance Level: No physical assistance  CARE Score: 4  Discharge Goal: Independent    I      Exam:    Blood pressure (!) 147/98, pulse 108, temperature 98 °F (36.7 °C), temperature source Oral, resp. rate 18, height 6' 1\" (1.854 m), weight 208 lb 5.4 oz (94.5 kg), SpO2 97 %. General: Up in a bedside chair. Legs elevated. Soft-spoken but alert. HEENT: Neck supple. MMM. Clear speech. Pulmonary: Unlabored without wheezes or rales. Cardiac: Controlled rate but premature beats were noted. Abdomen: Patient's abdomen is soft and minimally distended. Bowel sounds were present throughout. There was no rebound, guarding or masses noted. Incision is clean and dry. Upper extremities: He has a full range of motion of the major joints. Fair  strength and dexterity. Lower extremities: Weakness proximally and distally (3+/5). No signs of DVT. Heels clear. Sitting balance was good.   Standing balance was perforation with moderate protein calorie malnutrition: Drain sites and wound seem to be healing.  Increasing his oral intake.  Outpatient follow-up with general surgery. 6. Paroxysmal atrial fibrillation: Coreg for rate control.  Daily weights do not reveal any decompensation of CHF.  Vital signs are checked at rest and with activity. 7.   Hypertension: His Coreg also helps manage his systolic blood pressure.  Target systolic blood pressure ranges 120-140.  Vital signs are checked at rest and with activity. Blood pressure is a little above the target range. Monitoring closely. 8. History of DVT: Progressive mobilization.  Lovenox for DVT prophylaxis. No new swelling or bruising.         Counseling and Coordination of Care: In care conference today I met with the patient's OT, PT, RN and . We discussed the patient's problems, progress and prognosis. Disposition issues were clarified and plans were established for ongoing rehabilitation efforts beyond the ARU stay. I reviewed this information with the patient during a second distinct visit with the patient. More than half of the total time of 34 minutes spent with the patient involved counseling and coordination of care.

## 2021-11-03 NOTE — PROGRESS NOTES
[facility]    [facility] 1945  Acct #: [de-identified]  MRN: [id number]              PM&R Progress Note      Admitting diagnosis: Critical illness myopathy ( [place] [address])     Comorbid diagnoses impacting rehabilitation: Generalized weakness, gait disturbance, uncontrolled pain, prostate cancer with radical prostatectomy on 10/11/2021, small bowel perforation with resection on 10/16/2021, paroxysmal atrial fibrillation, acute kidney injury, essential hypertension, diverticulitis, history of DVT    Chief complaint: Bowels move again. Feeling stronger. Prior (baseline) level of function: Independent. Current level of function:         Current  IRF-MAK and Goals:   Occupational Therapy:    Short term goals  Time Frame for Short term goals: STG=LTG :   Long term goals  Time Frame for Long term goals : 5-7 days or until discharge  Long term goal 1: Pt will perform all grooming tasks with Mod I.  Long term goal 2: Pt will perform UB dressing with Mod I.  Long term goal 3: Pt will perform LB dressing with Mod I.  Long term goal 4: Pt will don/doff footwear with Mod I.  Long term goal 5: Pt will perform bathing with Mod I.  Long term goals 6: Pt will perform toileting with Mod I.  Long term goal 7: Pt will perform all fxl transfers (toilet, shower, etc.) with Mod I, DME PRN.   Long term goal 8: Pt will participate in therex/theract with emphasis on dynamic stance >10-12 min to increase endurance and dynamic standing tolerance needed for ADLs/IADLs :                                       Eating: Eating  Assistance Needed: Independent  Comment: Open and eat applesauce with Ind.  CARE Score: 6       Oral Hygiene: Oral Hygiene  Assistance Needed: Independent  Comment: seated  CARE Score: 6  Discharge Goal: Independent    UB/LB Bathing: Shower/Bathe Self  Assistance Needed: Supervision or touching assistance  Comment: for full shower seated  CARE Score: 4  Discharge Goal: Independent    UB Dressing: Upper Body Dressing  Assistance Needed: Supervision or touching assistance  Comment: retrieved from closet with supervision; did not recognize clothes were hanging in bathroom and required cue to initate getting dressed  CARE Score: 4  Discharge Goal: Independent         LB Dressing: Lower Body Dressing  Assistance Needed: Supervision or touching assistance  Comment: to doff/don Depends and pants  CARE Score: 4  Discharge Goal: Independent    Donning and [place] Footwear: Putting On/Taking Off Footwear  Assistance Needed: Independent  Comment: to doff/don slip on shoes  CARE Score: 6  Discharge Goal: Independent      Toileting: Toileting Hygiene  Assistance Needed: Supervision or touching assistance  Comment: to urinate/have BM  Reason if not Attempted: Patient refused  CARE Score: 4  Discharge Goal: Independent      Toilet Transfers: Toilet Transfer  Assistance Needed: Supervision or touching assistance  Comment: supervision c SPC, grab bar  CARE Score: 4  Discharge Goal: Independent    Physical Therapy:   Short term goals  Time Frame for Short term goals: about one week  Short term goal 1: indep all bed mobility and bed xfers  Short term goal 2: indep all basic sit-stand xfers from bed/chair/car, supervision for floor xfer  Short term goal 3: amb 150 feet without device, with good balance, independently  Short term goal 4: up and down 12 stairs with modified independence  Short term goal 5: indep with HEP for general conditioning and endurance. Long term goals  Time Frame for Long term goals : anticipate 7 days. Bed Mobility:   Sit to Lying  Assistance Needed: Independent  Comment: no rails, flat bed, efficient, safe.   trial from each side of bed  CARE Score: 6  Discharge Goal: Independent  Roll Left and Right  Assistance Needed: Independent  Comment: flat bed, no rails, moved well to each side, safely  CARE Score: 6  Discharge Goal: Independent  Lying to Sitting on Side of Bed  Assistance Needed: [facility] Level: No physical assistance  Comment: no rail, safe, trial to each EOB  CARE Score: 6  Discharge Goal: Independent    Transfers:    Sit to Stand  Assistance Needed: Independent  Comment: repetitive sit-stand from EOB, repetitive sit-stand to and from chair, and modified indep to and from commode. CARE Score: 6  Discharge Goal: Independent  Chair/Bed-to-Chair Transfer  Assistance Needed: Supervision or touching assistance  Comment: SBA, pt unsteady, very slow  CARE Score: 4  Discharge Goal: Independent     Car Transfer  Assistance Needed: Independent  Comment: patient stated he rode in his/wife's car to [facility] to [facility]U. patient able to open and close door. CARE Score: 6  Discharge Goal: Independent    Ambulation:    Walking Ability  Does the Patient Walk?: Yes     Walk 10 Feet  Assistance Needed: Supervision or touching assistance  Physical Assistance Level: No physical assistance  Comment: very slow, pt unsure of self with 2ww  CARE Score: 4  Discharge Goal: Independent     Walk 50 Feet with Two Turns  Assistance Needed: Supervision or touching assistance  Physical Assistance Level: No physical assistance  Comment: slow gait with 2ww, decreased step height, narrow SANJEEV  CARE Score: 4  Discharge Goal: Independent     Walk 150 Feet  Assistance Needed: Independent  Physical Assistance Level: No physical assistance  Comment: Pt not able to ambulate this distance today due to decreased activity tolerance  Reason if not Attempted: Not attempted due to medical condition or safety concerns  CARE Score: 88  Discharge Goal: Independent     Walking 10 Feet on Uneven Surfaces  Assistance Needed: Supervision or touching assistance  Physical Assistance Level: No physical assistance  Comment: patient navigated rug with objects underneath, turned onto ramp, climbed ramp, descended curb step, advanced from floor back to rug and amb over uneven surface again. 30 feet total, safely, SBA, with RW device.   CARE Score: 4  Discharge Goal: Independent     1 Step (Curb)  Assistance Needed: Supervision or touching assistance  Physical Assistance Level: No physical assistance  Comment: CG for steadying  CARE Score: 4  Discharge Goal: Independent     4 Steps  Assistance Needed: Supervision or touching assistance  Physical Assistance Level: No physical assistance  Comment: SBA with cane and one rail, step-to pattern, mod fatigue  CARE Score: 4  Discharge Goal: Independent     12 Steps  Assistance Needed: Supervision or touching assistance  Physical Assistance Level: No physical assistance  Comment: unable to tolerate 12 steps due to decreased activiy tolerance today  Reason if not Attempted: Not attempted due to medical condition or safety concerns  CARE Score: 88  Discharge Goal: Independent       Wheelchair:  w/c Ability: Wheelchair Ability  Uses a Wheelchair and/or Scooter?: No                Balance:        Object: Picking Up Object  Assistance Needed: Supervision or touching assistance  Physical Assistance Level: No physical assistance  CARE Score: 4  Discharge Goal: Independent    I      Exam:    Blood pressure (!) 157/106, pulse 69, temperature 98.6 °F (37 °C), resp. rate 18, height 6' 1\" (1.854 m), weight 193 lb 5.5 oz (87.7 kg), SpO2 100 %. General: Lying back in bed. Quiet. In no distress. HEENT: Neck supple. Full visual field. Soft-spoken. Pulmonary: Unlabored and clear. Cardiac: Occasional premature beats. Rate controlled. Abdomen: Patient's abdomen is soft and nondistended. Bowel sounds were present throughout. There was no rebound, guarding or masses noted. Midline incision well-healed. Upper extremities: No new bruising or swelling. Lower extremities: No signs of DVT. Weakness proximally and distally. Sitting balance was good.   Standing balance was fair-.    Lab Results   Component Value Date    WBC 6.6 10/31/2021    HGB 9.5 (L) 10/31/2021    HCT 29.9 (L) 10/31/2021    MCV rest and with activity. 7.   Hypertension: His Coreg also helps manage his systolic blood pressure.  Target systolic blood pressure ranges 120-140.  Vital signs are checked at rest and with activity. Blood pressure is above the target range. Monitoring closely. 8. History of DVT: Progressive mobilization.  Lovenox for DVT prophylaxis. No new swelling or bruising.

## 2021-11-03 NOTE — FLOWSHEET NOTE
supervision  Assistive device required for transfer:   FWW    Gait:    Distance:  252'   Assistance:  supervision  Device:  FWW  Gait Quality:  Reciprocal pattern     Stairs   # Completed:  4 steps up and down (6 inch steps)  Assistance:  Supervision  Supportive Device: [facility]    Additional Therapeutic activities/exercises completed this date:     []   Nu-step:  Time:        Level:         #Steps:       []   Rebounder:    []  Seated     []  Standing        []   Balance training         []   Postural training    []   Supine ther ex (reps/sets):     []   Seated ther ex (reps/sets):     [x]   Standing ther ex (reps/sets):   Marches, hip extensions, hip abduction, heel raises, toe raises, mini-squats 1 set x 10 reps [each]). Provided patient with HEP handout and specified 10-15 reps, 1-2 times per day. Specified to use countertop for stability instead of chair. Specified to take rest breaks as needed, and to stay in a pain-free range. []   Picking up object from floor (standing):                   []   Reacher used   []   Other:   []   Other:    Comments:    SpO2 monitored throughout session. Prior to exercise 98%, between 99%, end session 98%  BP prior to exercise 121/90, BP post-exercise 144/86  Pt took rest breaks between each exercise/ activity    Patient/Caregiver Education and Training:   [x]   Bed Mobility/Transfer technique/safety  [x]   Gait technique/sequencing  [x]   Proper use of assistive device  [x]   Advanced mobility safety and technique  []   Reinforced patient's precautions/mobility while maintaining precautions  []   Postural awareness  []   Family training    Treatment Plan for Next Session: continue with exercises to strengthen the LE as well as improve endurance      Assessment: This pt demonstrated a positive response to today's treatment as evidenced by improved strength and endurance. The patient is making progress toward established goals as evidenced by QI scores.  Ongoing deficits are

## 2021-11-03 NOTE — PROGRESS NOTES
Occupational Therapy    Physical Rehabilitation: OCCUPATIONAL THERAPY     [x] daily progress note       [] discharge       Patient Name:  Kristyn Zamora   :  1945 MRN: [id number]  Room:  [address] [place] [address] Date of Admission: 10/29/2021  Rehabilitation Diagnosis:   Critical illness myopathy [G72.81]  Critical illness myopathy [G72.81]       Date 11/3/2021       Day of ARU Week:  6   Time IN/OUT 1000/1100  1300/1400   Individual Tx Minutes 60+60   Group Tx Minutes    Co-Treat Minutes    Concurrent Tx Minutes    TOTAL Tx Time Mins 120   Variance Time    Variance Time []   Refusal due to:     []   Medical hold/reason:    []   Illness   []   Off Unit for test/procedure  []   Extra time needed to complete task  []   Therapeutic need  []   Other (specify):   Restrictions Restrictions/Precautions: General Precautions, Fall Risk         Communication with other providers: [x]   OK to see per nursing:     []   Spoke with team member regarding:      Subjective observations and cognitive status: AM: Pt in bed dozing on approach, agreeable to ADL session. Reported taking some pain medicine last night for rib pain    PM: Pt seated EOB on approach, slowly eating, agreeable to tx session   Pain level/location:    /10       Location: Denied   Discharge recommendations  Anticipated discharge date:    Destination: []?home alone   []?home alone w assist prn   [x]? home w/ family    []? Continuous supervision       []? SNF    []? Assisted living     []? Other:   Continued therapy: [x]? Nhung Will OT  []? OUTPATIENT  OT   []? No Further OT  Equipment needs: Shower chair     Kristyn Zamora requires the assistance of a shower chair to successfully and safely complete bathing activities necessary due to reduced balance, endurance, need for ADL assist, and LE weakness and reduced ROM. These tasks cannot be safely completed without this device and would place Kristyn Zamora at greater risk of falls.        ADLs:       Oral Hygiene: Oral Hygiene  Assistance Needed: Independent  Comment: seated  CARE Score: 6  Discharge Goal: Independent    UB/LB Bathing: Shower/Bathe Self  Assistance Needed: Supervision or touching assistance  Comment: for full shower seated  CARE Score: 4  Discharge Goal: Independent    UB Dressing: Upper Body Dressing  Assistance Needed: Supervision or touching assistance  Comment: retrieved from closet with supervision; did not recognize clothes were hanging in bathroom and required cue to initate getting dressed  CARE Score: 4  Discharge Goal: Independent         LB Dressing: Lower Body Dressing  Assistance Needed: Supervision or touching assistance  Comment: to doff/don Depends and pants  CARE Score: 4  Discharge Goal: Independent    Donning and [place] Footwear: Putting On/Taking Off Footwear  Assistance Needed: Independent  Comment: to doff/don slip on shoes  CARE Score: 6  Discharge Goal: Independent      Toileting: Toileting Hygiene  Assistance Needed: Supervision or touching assistance  Comment: to urinate/have BM; urinated in PM also  CARE Score: 4  Discharge Goal: Independent      Toilet Transfers: Toilet Transfer  Assistance Needed: Supervision or touching assistance  Comment: supervision c SPC, grab bar  CARE Score: 4  Discharge Goal: Independent  Device Used:    []   Standard Toilet         []   Grab Bars           []  Bedside Commode       []   Elevated Toilet          []   Other:        Bed Mobility:           []   Pt received out of bed   Supine --> Sit:  Mod I   Sit --> Supine: Mod I    Transfers:    Sit--> Stand:   Supervision  Stand --> Sit:   Supervision  Stand-Pivot:   Supervision  Other:    Assistive device required for transfer:   SPC and RW      Functional Mobility:    Assistance:   AM and PM: SBA-Supervision with cane within room, in PM used RW in kitchen and gym.   Pt with improved speed with RW  Device:   [x]   Rolling Walker     []   Standard Walker []   Wheelchair        [x]   [employer]       [] Aida Dumont         []   Cardiac Veronique Fernandez       []   Other:        Homemaking Tasks:   Educated on walker safety with emphasis on IADL task completion. Pt then demo'ed good carryover of safety education, accessing cupboards and transporting items c supervision using RW        Additional Therapeutic activities/exercises completed this date:     [x]   ADL Training   [x]   Balance/Postural training     [x]   Bed/Transfer Training   [x]   Endurance Training   []   Neuromuscular Re-ed   []   Nu-step:  Time:        Level:         #Steps:       []   Rebounder:    []  Seated     []  Standing        [x]   Supine Ther Ex (reps/sets): To increase BUE endurance for ADLs and transfers, pt completed 1 set x10 reps of the following therex, simultaneously c BUEs, c a 5# weighted bar:   Shoulder presses  Chest presses  Bicep curls      [x]   Seated Ther Ex (reps/sets): To increase BUE endurance for ADLs and transfers, pt completed 1 set x10 reps of the following 5 exercises using purple theraband: alternating shoulder presses, chest presses, shoulder horizontal abduction, biceps curls; and simultaneous shoulder horizontal abduction/adduction. Educated pt on HEP at d/c to increase/maintain BUE endurance, pt verbalized understanding. To increase BUE endurance for ADLs and transfers, pt completed 10 mins on arm ergometer, mod resistance, 0 rest breaks x30 RPM           Comments: All intervention performed to increase pt's endurance, ax tolerance, balance,   and I c ADLs/IADLs and functional transfers/mobility. Patient/Caregiver Education and Training:   []   [employer] Equipment Use  [x]   Bed Mobility/Transfer Technique/Safety  []   Energy Conservation Tips  []   Family training  [x]   Postural Awareness  [x]   Safety During Functional Activities  []   Reinforced Patient's Precautions   []   Progress was updated and reviewed in Rehabtracker with patient and/or family this         date.     Treatment Plan for Next Session: Continue OT POC, QI day      Assessment: This pt demonstrated a positive response to today's treatment as evidenced by good engagement. The patient is making progress toward established goals as evidenced by QI scores. Ongoing deficits are observed in the areas of balance, endurance and continued focus on this is recommended. Treatment/Activity Tolerance:   [x] Tolerated treatment with no adverse effects    [] Patient limited by fatigue  [] Patient limited by pain   [] Patient limited by medical complications:    [] Adverse reaction to Tx:   [] Significant change in status    Safety:       [x]  bed alarm set    []  chair alarm set    []  Pt refused alarms                []  Telesitter activated      [x]  Gait belt used during tx session      []other:       Number of Minutes/Billable Intervention  Therapeutic Exercise 30   ADL Self-care 60   Neuro Re-Ed    Therapeutic Activity 30   Group    Other:    TOTAL 120       Social History  Social/Functional History  Lives With: Spouse  Type of Home: House  Home Layout: One level, Laundry in basement  Home Access: Stairs to enter without rails  Entrance Stairs - Number of Steps: 3  Bathroom Shower/Tub: Tub/Shower unit  Bathroom Toilet: Standard  Bathroom Accessibility: Walker accessible  Home Equipment: Cane (Cane available, pt was not using AD PTA)  ADL Assistance: Independent  Homemaking Assistance: Independent  Homemaking Responsibilities: Yes  Ambulation Assistance: Independent  Transfer Assistance: Independent  Active : Yes  Mode of Transportation: Truck, Car  Education: Master's degree - political science  Occupation: Full time employment  Type of occupation:   Leisure & Hobbies: Reading, watching TV, working out at [address]: Pt manages own medications with pill box. Additional Comments: Pt sleeps on flat bed at baseline. Pt reports no falls in the past 6 months.     Objective Goals:  (Update in navigator)  Short term goals  Time Frame for Short term goals: STG=LTG:  Long term goals  Time Frame for Long term goals : 5-7 days or until discharge  Long term goal 1: Pt will perform all grooming tasks with Mod I.  Long term goal 2: Pt will perform UB dressing with Mod I.  Long term goal 3: Pt will perform LB dressing with Mod I.  Long term goal 4: Pt will don/doff footwear with Mod I.  Long term goal 5: Pt will perform bathing with Mod I.  Long term goals 6: Pt will perform toileting with Mod I.  Long term goal 7: Pt will perform all fxl transfers (toilet, shower, etc.) with Mod I, DME PRN. Long term goal 8: Pt will participate in therex/theract with emphasis on dynamic stance >10-12 min to increase endurance and dynamic standing tolerance needed for ADLs/IADLs:        Plan of Care                                                                              Times per week: 5 days per week for a minimum of 60 minutes/day plus group as appropriate for 60 minutes. Treatment to include Plan  Times per week: 5x  Times per day: Daily  Plan weeks: 5-7 days or until discharge  Current Treatment Recommendations: Strengthening, Functional Mobility Training, Gait Training, Patient/Caregiver Education & Training, Home Management Training, Endurance Training, Stair training, Pain Management, Equipment Evaluation, Education, & procurement, Balance Training, Safety Education & Training, Self-Care / ADL    Electronically signed by   Len Barrow MS, OTR/L  License #[id number]  11/3/2021, 10:58 AM

## 2021-11-04 PROCEDURE — 99232 SBSQ HOSP IP/OBS MODERATE 35: CPT | Performed by: PHYSICAL MEDICINE & REHABILITATION

## 2021-11-04 PROCEDURE — 97116 GAIT TRAINING THERAPY: CPT

## 2021-11-04 PROCEDURE — 6370000000 HC RX 637 (ALT 250 FOR IP): Performed by: PHYSICAL MEDICINE & REHABILITATION

## 2021-11-04 PROCEDURE — 94761 N-INVAS EAR/PLS OXIMETRY MLT: CPT

## 2021-11-04 PROCEDURE — 1280000000 HC REHAB R&B

## 2021-11-04 PROCEDURE — 6360000002 HC RX W HCPCS: Performed by: PHYSICAL MEDICINE & REHABILITATION

## 2021-11-04 PROCEDURE — 97535 SELF CARE MNGMENT TRAINING: CPT

## 2021-11-04 PROCEDURE — 94150 VITAL CAPACITY TEST: CPT

## 2021-11-04 PROCEDURE — 97542 WHEELCHAIR MNGMENT TRAINING: CPT

## 2021-11-04 PROCEDURE — 97110 THERAPEUTIC EXERCISES: CPT

## 2021-11-04 PROCEDURE — 97530 THERAPEUTIC ACTIVITIES: CPT

## 2021-11-04 RX ADMIN — DOCUSATE SODIUM 100 MG: 100 CAPSULE ORAL at 09:32

## 2021-11-04 RX ADMIN — ATORVASTATIN CALCIUM 80 MG: 40 TABLET, FILM COATED ORAL at 20:54

## 2021-11-04 RX ADMIN — CARVEDILOL 3.12 MG: 6.25 TABLET, FILM COATED ORAL at 09:32

## 2021-11-04 RX ADMIN — TAMSULOSIN HYDROCHLORIDE 0.4 MG: 0.4 CAPSULE ORAL at 09:32

## 2021-11-04 RX ADMIN — ASPIRIN 81 MG: 81 TABLET, CHEWABLE ORAL at 09:32

## 2021-11-04 RX ADMIN — CARVEDILOL 3.12 MG: 6.25 TABLET, FILM COATED ORAL at 17:37

## 2021-11-04 RX ADMIN — CLOPIDOGREL BISULFATE 75 MG: 75 TABLET ORAL at 09:32

## 2021-11-04 ASSESSMENT — PAIN SCALES - GENERAL: PAINLEVEL_OUTOF10: 3

## 2021-11-04 NOTE — CARE COORDINATION
Patient provided with list of Medicare participating [facility] agencies in the geographic area of the patient served. Patient selected [facility] and was provided with a comparative data handout from [address] [place] [address] website. The patient (and/or Family) was educated on the quality outcomes for each provider. Patient (and/or Family) demonstrated understanding. Per patient/family request, referral made to [facility]. Referral for PT, OT, and Nursing given to [facility] with [facility].     12:10 PM  Hospital follow-up set with patient's PCP, Dr. Simone Mcgovern ([phone]), for 11/08/21 at 9 AM.

## 2021-11-04 NOTE — PROGRESS NOTES
Occupational Therapy   Physical Rehabilitation: OCCUPATIONAL THERAPY     [x] daily progress note       [] discharge       Patient Name:  Silvestre Guaman   :  1945 MRN: [id number]  Room:  [address] [place] [address] Date of Admission: 10/29/2021  Rehabilitation Diagnosis:   Critical illness myopathy [G72.81]  Critical illness myopathy [G72.81]       Date 2021       Day of ARU Week:  7   Time IN// 932   Individual Tx Minutes 60   Group Tx Minutes    Co-Treat Minutes    Concurrent Tx Minutes    TOTAL Tx Time Mins 60   Variance Time    Variance Time []   Refusal due to:     []   Medical hold/reason:    []   Illness   []   Off Unit for test/procedure  []   Extra time needed to complete task  []   Therapeutic need  []   Other (specify):   Restrictions Restrictions/Precautions: General Precautions, Fall Risk         Communication with other providers: [x]   OK to see per nursing:     []   Spoke with team member regarding:      Subjective observations and cognitive status: Pt laying in bed upon arrival. Pt agreeable to therapy/ Pt reports \"My biggest issue right now is the constipation causing pain and discomfort\". Pt reported \" I hope they hold off on the discharge because his wife works and she is still trying to get the bathroom and bedroom setup for him. \"     Pt reported not having a RW, shower chair, or grab bars around the toilet. Pain level/location:   5 /10       Location: posterior lower back due to constipation    Discharge recommendations  Anticipated discharge date:  2021  Destination: []home alone   []home alone w assist prn   [x] home w/ family    [] Continuous supervision       []SNF    [] Assisted living     [] Other:   Continued therapy: [x]HHC OT  []OUTPATIENT  OT   [] No Further OT  Equipment needs: Pt requires a shower chair to be able to successfully complete ADLs due LE weakness and reduced endurance.  Pt is at a great fall risk and would benefit from a shower chair to reduce risk of falls.      ADLs:    Eating: Eating  Assistance Needed: Independent  Comment: Pt able to bring food to mouth with utensil and open containers  CARE Score: 6  Discharge Goal: Independent       Oral Hygiene: Oral Hygiene  Assistance Needed: Independent  Comment: Completed sitting at sink  CARE Score: 6  Discharge Goal: Independent    UB/LB Bathing: Shower/Bathe Self  Assistance Needed: Independent  Comment: Completed full with IND sitting on shower bench. CARE Score: 6  Discharge Goal: Independent    UB Dressing: Upper Body Dressing  Assistance Needed: Independent  Comment: Pt completed UB dressing without assistance  CARE Score: 6  Discharge Goal: Independent         LB Dressing: Lower Body Dressing  Assistance Needed: Independent  Comment: Pt completed threading briefs and pants sitting and  standing to complete clothing managment  CARE Score: 6  Discharge Goal: Independent    Donning and [place] Footwear: Putting On/Taking Off Footwear  Assistance Needed: Independent  Comment: Donned and doffed slip on slip on shoes with no assistance  CARE Score: 6  Discharge Goal: Independent      Toileting: Toileting Hygiene  Assistance Needed: Supervision or touching assistance  Comment: completed clothing managment with SUP and complete toilet hygiene sitting with IND. Reason if not Attempted: Patient refused  CARE Score: 4  Discharge Goal: Independent      Toilet Transfers: Toilet Transfer  Assistance Needed: Supervision or touching assistance  Comment: With use of Bilateral grab bars and RW; SUP due to patient being unsteady at first  CARE Score: 4  Discharge Goal: Independent  Device Used:    []   Standard Toilet         []   Clementeen Ratel           []  Bedside Commode       []   Elevated Toilet          []   Other:             Toilet Transfers:   Use of RW- SUP due to patient being unsteady at first; pt required bilateral grab bars   Device Used:    [x]   Standard Toilet         [x]   Clementeen Ratel           []  Bedside Commode       []   Elevated Toilet          []   Other:        Bed Mobility:           []   Pt received out of bed   Rolling R/L:  MOD I use of bed rails   Scooting:  Ind   Supine --> Sit:  MOD I use of bed rails to assist   Sit --> Supine:  Pt left sitting up in recliner - use of chair alarm     Transfers:    Sit--> Stand:  SUP use of RW to assist lift off -  verbal cues to for hand placement when standing   Stand --> Sit:   SUP   Assistive device required for transfer:   RW       Functional Mobility:    Assistance:  RW use to bathroom; Pt used SPC from bathroom to recliner   To/from bathroom ; To recliner  Device:   [x]   Rolling Walker     []   Standard Valentino Sings []   Wheelchair        [x]   [place]       []   4-Wheeled Valentino Sings         []   Cardiac Valentino Sings       []   Other:             Additional Therapeutic activities/exercises completed this date:     [x]   ADL Training   []   Balance/Postural training     [x]   Bed/Transfer Training   [x]   Endurance Training   []   Neuromuscular Re-ed   []   Nu-step:  Time:        Level:         #Steps:       []   Rebounder:    []  Seated     []  Standing        []   Supine Ther Ex (reps/sets):     []   Seated Ther Ex (reps/sets):     []   Standing Ther Ex (reps/sets):     []   Other:      Comments: From bed to bathroom use of RW with IND. Pt completed toilet transfer with use of bilateral grab bars SUP of safety. Pt transferred into shower with use of grab bars- SUP. Pt completed shower sitting on bench due to reduced endurance. No assistance for bathing in shower. Pt completed grooming sitting at sink; with intermittent periods of standing. Pt stood at sink for 3.20 min/sec without UE support. Pt completed LB dressing sitting at sink; pt stood to complete clothing management with IND. Pt ambulated from bathroom to recliner with use SPC. Pt was left in recliner with chair alarm activated.     Patient/Caregiver Education and Training:   [x]   Adaptive Equipment Use  [x]   Bed Mobility/Transfer Technique/Safety  [x]   Energy Conservation Tips  []   Family training  []   Postural Awareness  [x]   Safety During Functional Activities  []   Reinforced Patient's Precautions   []   Progress was updated and reviewed in Rehabtracker with patient and/or family this         date. Treatment Plan for Next Session: Continue POC - caregiver/ family education     Assessment: This pt demonstrated a positive response to today's treatment as evidenced by patient reporting reduced discomfort. The patient is making good progress toward established goals as evidenced by QI scores. Ongoing deficits are observed in the areas of endurance and continued focus on endurance and caregiver/ patient education is recommended.        Treatment/Activity Tolerance:   [x] Tolerated treatment with no adverse effects    [] Patient limited by fatigue  [] Patient limited by pain   [] Patient limited by medical complications:    [] Adverse reaction to Tx:   [] Significant change in status    Safety:       [x]  bed alarm set    [x]  chair alarm set    []  Pt refused alarms                []  Telesitter activated      [x]  Gait belt used during tx session      []other:       Number of Minutes/Billable Intervention  Therapeutic Exercise    ADL Self-care 60   Neuro Re-Ed    Therapeutic Activity    Group    Other:    TOTAL 60       Social History  Social/Functional History  Lives With: Spouse  Type of Home: House  Home Layout: One level, Laundry in basement  Home Access: Stairs to enter without rails  Entrance Stairs - Number of Steps: 3  Bathroom Shower/Tub: Tub/Shower unit  Bathroom Toilet: Standard  Bathroom Accessibility: Walker accessible  Home Equipment: Cane (Cane available, pt was not using AD PTA)  ADL Assistance: Independent  Homemaking Assistance: Independent  Homemaking Responsibilities: Yes  Ambulation Assistance: Independent  Transfer Assistance: Independent  Active : Yes  Mode of Transportation: Truck, Car  Education: Master's degree - political science  Occupation: Full time employment  Type of occupation:   Leisure & Hobbies: Reading, watching TV, working out at [employer] 3x/week  IADL Comments: Pt manages own medications with pill box. Additional Comments: Pt sleeps on flat bed at baseline. Pt reports no falls in the past 6 months. Objective                                                                                    Goals:  (Update in navigator)  Short term goals  Time Frame for Short term goals: STG=LTG:  Long term goals  Time Frame for Long term goals : 5-7 days or until discharge  Long term goal 1: Pt will perform all grooming tasks with Mod I.  Long term goal 2: Pt will perform UB dressing with Mod I.  Long term goal 3: Pt will perform LB dressing with Mod I.  Long term goal 4: Pt will don/doff footwear with Mod I.  Long term goal 5: Pt will perform bathing with Mod I.  Long term goals 6: Pt will perform toileting with Mod I.  Long term goal 7: Pt will perform all fxl transfers (toilet, shower, etc.) with Mod I, DME PRN. Long term goal 8: Pt will participate in therex/theract with emphasis on dynamic stance >10-12 min to increase endurance and dynamic standing tolerance needed for ADLs/IADLs:        Plan of Care                                                                              Times per week: 5 days per week for a minimum of 60 minutes/day plus group as appropriate for 60 minutes.   Treatment to include Plan  Times per week: 5x  Times per day: Daily  Plan weeks: 5-7 days or until discharge  Current Treatment Recommendations: Strengthening, Functional Mobility Training, Gait Training, Patient/Caregiver Education & Training, Home Management Training, Endurance Training, Stair training, Pain Management, Equipment Evaluation, Education, & procurement, Balance Training, Safety Education & Training, Self-Care / ADL    Electronically signed by   ADEBAYO Tucker, CHACHO/JON  [id number]  11/4/2021, 8:40 AM

## 2021-11-04 NOTE — PROGRESS NOTES
[facility] Liaison spoke with pt and pt is agreeable to [facility] at discharge.  Please place inpatient consult to home health needs order in [facility] [place].

## 2021-11-04 NOTE — PROGRESS NOTES
Physical Therapy      [] daily progress note       [x] discharge       Patient Name:  Cheryl Sams   :  1945 MRN: [id number]  Room:  [address] [place] [address] Date of Admission: 10/29/2021  Rehabilitation Diagnosis:   Critical illness myopathy [G72.81]  Critical illness myopathy [G72.81]       Date 2021       Day of ARU Week:  7   Time IN/OUT 2561-9514  0713-5799   Individual Tx Minutes 70+50   TOTAL Tx Time Mins 120   Variance Time    Variance Time []   Refusal due to:     []   Medical hold/reason:    []   Illness   []   Off Unit for test/procedure  []   Extra time needed to complete task  []   Therapeutic need  []   Other (specify):   Restrictions Restrictions/Precautions  Restrictions/Precautions: General Precautions, Fall Risk  Required Braces or Orthoses?: No      Communication with other providers: [x]   OK to see per nursing:     []   Spoke with team member regarding:      Subjective observations and cognitive status: Pt up in recliner, agreeable to session. C/o constipation which he is concerned about. Also reports concern about discharge date being hard on his wife, unsure if she will \"have everything in place\" by tomorrow. Pt with fatigue, req multiple rest breaks and extra time for ax's  PM: Pt in BR upon arrival with call light on. Pt willing to participate although c/o fatigue. Pain level/location:  Reports \"soreness\" only at abdomen   Discharge recommendations  Anticipated discharge date: 2021  Destination: []??home alone   []? ?home alone with assist PRN     [x]? ? home w/ family but alone during day     []? ? Continuous supervision  []? ?SNF    []? ? Assisted living     []? ? Other:  Continued therapy: [x]? ?[facility]  []??OUTPATIENT  PT   []? ? No Further PT  []? ?SNF PT  Caregiver training recommended: []? ?Yes  []? ? No   Equipment needs: Luis Eden requires the assistance of a wheeled walker to successfully ambulate from room to room at home to allow completion of daily living tasks such as: bathing, toileting, dressing and grooming.  A wheeled walker is necessary due to the patient's unsteady gait, upper body weakness, inability to  a standard walker.  This patient can ambulate only by pushing a walker instead of using a lesser assistive device such as a cane or crutch. PT QI     Bed Mobility:     Roll Left and Right  Assistance Needed: Independent  Comment: without bed features  CARE Score: 6  Discharge Goal: Independent    Sit to Lying  Assistance Needed: Independent  Comment: without bed features  CARE Score: 6  Discharge Goal: Independent    Lying to Sitting on Side of Bed  Assistance Needed: Independent  Comment: without bed features  CARE Score: 6  Discharge Goal: Independent    Transfers:    Sit to Stand  Assistance Needed: Independent  Comment: with RW  CARE Score: 6  Discharge Goal: Independent    Chair/Bed-to-Chair Transfer  Assistance Needed: Supervision or touching assistance  Comment: Supervision for balance heriberto with fatigue with use of RW  CARE Score: 4  Discharge Goal: Independent    Toilet Transfer  Assistance Needed: Supervision or touching assistance  Comment: Supervision with RW for approach  CARE Score: 4       Car Transfer  Assistance Needed: Supervision or touching assistance  Comment: Supervision for approach with RW for balance, lifts LEs in/out of car without A  CARE Score: 4  Discharge Goal: Independent        Object: Picking Up Object  Assistance Needed: Independent  Comment: at RW using AE  CARE Score: 6  Discharge Goal: Independent    Ambulation:    Walking Ability  Does the Patient Walk?: Yes     Walk 10 Feet  Assistance Needed: Independent  Comment: with RW, demos safety and balance  CARE Score: 6  Discharge Goal: Independent     Walk 50 Feet with Two Turns  Assistance Needed: Supervision or touching assistance  Comment: Supervision req cue to LOB with turning when crossing feet req safety cues.  Pt able to self correct balance  CARE Score: 4  Discharge Goal: Independent     Walk 150 Feet  Assistance Needed: Supervision or touching assistance  Comment: Supervision for balance with RW  CARE Score: 4  Discharge Goal: Independent   Other distances: 202'+20' x 3+167' with RW; Pt reports fatigue in PM, unable to trial cane. Walking 10 Feet on Uneven Surfaces  Assistance Needed: Supervision or touching assistance  Comment: Supervision for balance with use of RW,  CARE Score: 4  Discharge Goal: Independent     1 Step (Curb)  Assistance Needed: Supervision or touching assistance  Comment: SBA with RW, demos safety without cuein  CARE Score: 4  Discharge Goal: Independent     4 Steps  Assistance Needed: Independent  Comment: with B rails in recip pattern  CARE Score: 6  Discharge Goal: Independent     12 Steps  Assistance Needed: Supervision or touching assistance  Comment: SB-Supervision due to fatigue, needing to finsh amb last 4 steps in non-recip pattern, req extra time. CARE Score: 4  Discharge Goal: Independent      Wheelchair:  W/C Ability: Wheelchair Ability  Uses a Wheelchair and/or Scooter?: No        Additional Therapeutic activities/exercises completed this date:     []   Nu-step:  Time:        Level:         #Steps:       []   Rebounder:    []  Seated     []  Standing        [x]   Balance training: Functional standing task at sink for hand hygiene Supervision         []   Postural training    []   Supine ther ex (reps/sets):     []   Seated ther ex (reps/sets):     [x]   Standing ther ex (reps/sets): B LEs with light touch support for heel raises, marching, mini squats, hip abd, hip ext x 10-15; Pt with fatigue req seated rest break after each ex. []   Picking up object from floor (standing):                   []   Reacher used   [x]   Other: Propelled WC using B LEs only 215' Indep. Navigates turns and obstacles safely.     []   Other:      Patient/Caregiver Education and Training:   [x]   Bed Mobility/Transfer technique/safety  [x]   Gait technique/sequencing  [x]   Proper use of assistive device  [x]   Advanced mobility safety and technique  []   Reinforced patient's precautions/mobility while maintaining precautions  []   Postural awareness  []   Family training      Treatment Plan for Next Session: Pt to be discharged to home with support of spouse. [facility] and continued use of RW recommended. Treatment/Activity Tolerance:   [x] Tolerated treatment with no adverse effects    [x] Patient limited by fatigue  [] Patient limited by pain   [] Patient limited by medical complications:    [] Adverse reaction to Tx:   [] Significant change in status    Safety:       []  bed alarm set    [x]  chair alarm set    []  Pt refused alarms                []  Telesitter activated      [x]  Gait belt used during tx session      []other:         Number of Minutes/Billable Intervention  Gait Training 45   Therapeutic Exercise 20   Neuro Re-Ed    Therapeutic Activity 40   Wheelchair Propulsion 15   Group    Other:    TOTAL 120         Social History  Social/Functional History  Lives With: Spouse  Type of Home: House  Home Layout: One level, Laundry in basement  Home Access: Stairs to enter without rails  Entrance Stairs - Number of Steps: 3  Bathroom Shower/Tub: Tub/Shower unit  Bathroom Toilet: Standard  Bathroom Accessibility: Walker accessible  Home Equipment: Cane (Cane available, pt was not using AD PTA)  ADL Assistance: Independent  Homemaking Assistance: Independent  Homemaking Responsibilities: Yes  Ambulation Assistance: Independent  Transfer Assistance: Independent  Active : Yes  Mode of Transportation: Truck, Car  Education: Master's degree - political science  Occupation: Full time employment  Type of occupation:   Leisure & Hobbies: Reading, watching TV, working out at [address]: Pt manages own medications with pill box. Additional Comments: Pt sleeps on flat bed at baseline.  Pt reports no falls in the past 6 months. Objective                                                                                    Goals:  (Update in navigator)  Short term goals  Time Frame for Short term goals: about one week  Short term goal 1: indep all bed mobility and bed xfers  Short term goal 2: indep all basic sit-stand xfers from bed/chair/car, supervision for floor xfer  Short term goal 3: amb 150 feet without device, with good balance, independently  Short term goal 4: up and down 12 stairs with modified independence  Short term goal 5: indep with HEP for general conditioning and endurance.:  Long term goals  Time Frame for Long term goals : anticipate 7 days.:        Plan of Care                                                                              Times per week: 5 days per week for a minimum of 60 minutes/day plus group as appropriate for 60 minutes.   Treatment to include      Electronically signed by   Popeye Agarwal, [facility]END[facility] #[id number]  11/4/2021, 4:53 PM

## 2021-11-05 PROCEDURE — 97116 GAIT TRAINING THERAPY: CPT

## 2021-11-05 PROCEDURE — 97530 THERAPEUTIC ACTIVITIES: CPT

## 2021-11-05 PROCEDURE — 99232 SBSQ HOSP IP/OBS MODERATE 35: CPT | Performed by: PHYSICAL MEDICINE & REHABILITATION

## 2021-11-05 PROCEDURE — 1280000000 HC REHAB R&B

## 2021-11-05 PROCEDURE — 6370000000 HC RX 637 (ALT 250 FOR IP): Performed by: PHYSICAL MEDICINE & REHABILITATION

## 2021-11-05 PROCEDURE — 6360000002 HC RX W HCPCS: Performed by: PHYSICAL MEDICINE & REHABILITATION

## 2021-11-05 PROCEDURE — 97535 SELF CARE MNGMENT TRAINING: CPT

## 2021-11-05 PROCEDURE — 94761 N-INVAS EAR/PLS OXIMETRY MLT: CPT

## 2021-11-05 PROCEDURE — 94150 VITAL CAPACITY TEST: CPT

## 2021-11-05 PROCEDURE — 97110 THERAPEUTIC EXERCISES: CPT

## 2021-11-05 RX ADMIN — ASPIRIN 81 MG: 81 TABLET, CHEWABLE ORAL at 09:50

## 2021-11-05 RX ADMIN — SENNOSIDES 17.2 MG: 8.6 TABLET, FILM COATED ORAL at 21:26

## 2021-11-05 RX ADMIN — CLOPIDOGREL BISULFATE 75 MG: 75 TABLET ORAL at 09:50

## 2021-11-05 RX ADMIN — DOCUSATE SODIUM 100 MG: 100 CAPSULE ORAL at 09:51

## 2021-11-05 RX ADMIN — TAMSULOSIN HYDROCHLORIDE 0.4 MG: 0.4 CAPSULE ORAL at 09:51

## 2021-11-05 RX ADMIN — CARVEDILOL 3.12 MG: 6.25 TABLET, FILM COATED ORAL at 09:51

## 2021-11-05 RX ADMIN — CARVEDILOL 3.12 MG: 6.25 TABLET, FILM COATED ORAL at 16:22

## 2021-11-05 RX ADMIN — DOCUSATE SODIUM 100 MG: 100 CAPSULE ORAL at 21:26

## 2021-11-05 RX ADMIN — ATORVASTATIN CALCIUM 80 MG: 40 TABLET, FILM COATED ORAL at 21:26

## 2021-11-05 ASSESSMENT — PAIN DESCRIPTION - DESCRIPTORS: DESCRIPTORS: CONSTANT;TENDER

## 2021-11-05 ASSESSMENT — PAIN SCALES - GENERAL: PAINLEVEL_OUTOF10: 3

## 2021-11-05 ASSESSMENT — PAIN DESCRIPTION - FREQUENCY: FREQUENCY: CONTINUOUS

## 2021-11-05 ASSESSMENT — PAIN DESCRIPTION - ORIENTATION: ORIENTATION: LOWER

## 2021-11-05 ASSESSMENT — PAIN DESCRIPTION - PAIN TYPE: TYPE: ACUTE PAIN

## 2021-11-05 ASSESSMENT — PAIN DESCRIPTION - LOCATION: LOCATION: BACK

## 2021-11-05 ASSESSMENT — PAIN - FUNCTIONAL ASSESSMENT: PAIN_FUNCTIONAL_ASSESSMENT: PREVENTS OR INTERFERES SOME ACTIVE ACTIVITIES AND ADLS

## 2021-11-05 NOTE — CARE COORDINATION
Urology follow-up set with Dr. Nancie Kyle ([phone]) for 01/04/22 at 1:15 PM.  Surgical follow-up set with Dr. Mary Abrams ([phone]) for 11/11/21 at 12:30 PM.  Location details added to discharge instructions. 4:00 PM  Patient follow-ups set. DME order sent to [facility] and RW has been delivered to patient room. HHC order sent to [facility] for PT, OT, and Nursing. Caregiver training was not recommended prior to discharge. Patient to discharge to home with wife Alexandra. Patient's spouse to provide transport home and Nursing is aware. Patient is set for discharge from case management standpoint.

## 2021-11-05 NOTE — PROGRESS NOTES
Physical Therapy  . [x] daily progress note       [x] discharge       Patient Name:  Meri Wooten   :  1945 MRN: [id number]  Room:  [address] [place] [address] Date of Admission: 10/29/2021  Rehabilitation Diagnosis:   Critical illness myopathy [G72.81]  Critical illness myopathy [G72.81]       Date 2021       Day of ARU Week:  1   Time IN/OUT 1101/1201   Individual Tx Minutes    Group Tx Minutes    Co-Treat Minutes    Concurrent Tx Minutes    TOTAL Tx Time Mins    Variance Time    Variance Time []   Refusal due to:     []   Medical hold/reason:    []   Illness   []   Off Unit for test/procedure  []   Extra time needed to complete task  []   Therapeutic need  []   Other (specify):   Restrictions Restrictions/Precautions  Restrictions/Precautions: General Precautions, Fall Risk  Required Braces or Orthoses?: No      Interdisciplinary communication [x]   Cleared for therapy per nursing     []   RN notified about issues during session  []   RN updated on pt performance  []   Spoke with   []   Spoke with OT  []   Spoke with MD  []   Other:    Subjective observations and cognitive status: Pt in recliner, agreeable to therapy. /55, HR 85. Pt not feeling strong throughout session.  Allowed multiple rests and did not finish all components of QI today in one session   Pain level/location:   2 /10       Location: abdomen   Discharge recommendations  Anticipated discharge date:    Destination: []home alone   []home alone with assist PRN     [x] home w/ family      [] Continuous supervision  []SNF    [] Assisted living     [] Other:  Continued therapy: [x]HHC PT  []OUTPATIENT PT   [] No Further PT  []SNF PT  Caregiver training recommended: []Yes  [x] No   Equipment needs: 2ww: was delivered this am. PT assessed for fit and function with good performance      PT IRF-MAK scores and goals for discharge assessment:   Roll Left and Right  Assistance Needed: Independent  Comment: without bed features  CARE Score: 6  Discharge Goal: Independent    Sit to Lying  Assistance Needed: Independent  Comment: without bed features  CARE Score: 6  Discharge Goal: Independent    Lying to Sitting on Side of Bed  Assistance Needed: Independent  Physical Assistance Level: No physical assistance  CARE Score: 6  Discharge Goal: Independent    Sit to Stand  Assistance Needed: Independent  Comment: with RW  CARE Score: 6  Discharge Goal: Independent    Chair/Bed-to-Chair Transfer  Assistance Needed: Independent  CARE Score: 6  Discharge Goal: Independent        Car Transfer  Assistance Needed: Supervision or touching assistance  Comment: Supervision for approach with RW for balance, lifts LEs in/out of car without A  CARE Score: 4  Discharge Goal: Independent    Walk 10 Feet? Walk 10 Feet?: Yes    1 Step  1 Step?: Yes    Picking Up Object  Assistance Needed: Independent  Physical Assistance Level: No physical assistance  Comment: at RW using AE  CARE Score: 6  Discharge Goal: Independent    Wheelchair Ability  Uses a Wheelchair and/or Scooter?: No                        Walking Ability  Does the Patient Walk?: Yes    Walk 10 Feet  Assistance Needed: Supervision or touching assistance  Physical Assistance Level: No physical assistance  Comment: low BP today, pt not confident  CARE Score: 4  Discharge Goal: Independent    Walk 50 Feet with Two Turns  Assistance Needed: Supervision or touching assistance  Physical Assistance Level: No physical assistance  Comment: Supervision req cue to LOB with turning when crossing feet req safety cues.  Pt able to self correct balance  CARE Score: 4  Discharge Goal: Independent    Walk 150 Feet  Assistance Needed: Supervision or touching assistance  Physical Assistance Level: No physical assistance  Comment: Supervision for balance with RW  CARE Score: 4  Discharge Goal: Independent    Walking 10 Feet on Uneven Surfaces  Assistance Needed: Supervision or touching assistance  Physical Assistance Level: No physical assistance  Comment: Supervision for balance with use of RW,  CARE Score: 4  Discharge Goal: Independent    1 Step (Curb)  Assistance Needed: Supervision or touching assistance  Physical Assistance Level: No physical assistance  Comment: SBA with RW, demos safety without cuein  CARE Score: 4  Discharge Goal: Independent    4 Steps  Assistance Needed: Supervision or touching assistance  Physical Assistance Level: No physical assistance  Comment: with B rails in recip pattern  CARE Score: 4  Discharge Goal: Independent    12 Steps  Assistance Needed: Supervision or touching assistance  Physical Assistance Level: No physical assistance  Comment: SB-Supervision due to fatigue, needing to finsh amb last 4 steps in non-recip pattern, req extra time. CARE Score: 4  Discharge Goal: Independent      Additional Therapeutic activities/exercises completed this date:     []   Nu-step:  Time:        Level:         #Steps:       []   Rebounder:    []  Seated     []  Standing        []   Balance training         []   Postural training    []   Supine ther ex (reps/sets):     []   Seated ther ex (reps/sets):     []   Standing ther ex (reps/sets):     []   Other: Toileting activity completed with    []   Other:    Comments:      Patient/Caregiver Education and Training:   []   Role of PT  [x]   Education about Dx: did not perform ex, but reviewed HEP handouts and recommended pt alternate days for UE and LE ex for energy conservation.  Educated in energy conservation techniques for home and community with good understanding  [x]   Use of call light for assist   [x]   HEP provided and explained   [x]   Treatment plan reviewed  [x]   Home safety  []   Wheelchair mobility/management   []   Body mechanics  [x]   Bed Mobility/Transfer technique  [x]   Gait technique/sequencing  [x]   Proper use of assistive device/adaptive equipment  [x]   Stair training/Advanced mobility safety and technique  []   Reinforced patient's precautions/mobility while maintaining precautions  []   Postural awareness  []   Family/caregiver training  []   Progress was updated and reviewed in Rehabtracker with patient and/or family this date. []   Other:    Treatment Plan for Next Session: n/a      Assessment: This pt has been performing variably due to activity tolerance issues after major surgery. Pt overall is at a supervision to mod I level with need for maximum time to complete tasks and rests in between activities. Pt is recommended to use 2ww as energy expenditure with straight cane causes great increase in fatigue.  Pt is recommended to have supervision initially, Nhung Will PT to follow to progress activity     Treatment/Activity Tolerance:   [] Tolerated treatment with no adverse effects    [x] Patient limited by fatigue  [] Patient limited by pain   [] Patient limited by medical complications:    [] Adverse reaction to Tx:   [] Significant change in status    Safety:       []  bed alarm set    [x]  chair alarm set    []  Pt refused alarms                []  Telesitter activated      [x]  Gait belt used during tx session      []other:       Number of Minutes/Billable Intervention  Gait Training 20   Therapeutic Exercise    Neuro Re-Ed    Therapeutic Activity 40   Wheelchair Propulsion    Group    Other:    TOTAL 60     Social History  Social/Functional History  Lives With: Spouse  Type of Home: House  Home Layout: One level, Laundry in basement  Home Access: Stairs to enter without rails  Entrance Stairs - Number of Steps: 3  Bathroom Shower/Tub: Tub/Shower unit  Bathroom Toilet: Standard  Bathroom Accessibility: Walker accessible  Home Equipment: Cane (Cane available, pt was not using AD PTA)  ADL Assistance: Independent  Homemaking Assistance: Independent  Homemaking Responsibilities: Yes  Ambulation Assistance: Independent  Transfer Assistance: Independent  Active : Yes  Mode of Transportation: Truck, Car  Education: Master's degree - political science  Occupation: Full time employment  Type of occupation:   Leisure & Hobbies: Reading, watching TV, working out at [employer] 3x/week  IADL Comments: Pt manages own medications with pill box. Additional Comments: Pt sleeps on flat bed at baseline. Pt reports no falls in the past 6 months. Objective                                                                                    Goals:  (Update in navigator)  Short term goals  Time Frame for Short term goals: about one week  Short term goal 1: indep all bed mobility and bed xfers  Short term goal 2: indep all basic sit-stand xfers from bed/chair/car, supervision for floor xfer  Short term goal 3: amb 150 feet without device, with good balance, independently  Short term goal 4: up and down 12 stairs with modified independence  Short term goal 5: indep with HEP for general conditioning and endurance.:  Long term goals  Time Frame for Long term goals : anticipate 7 days.:        Plan of Care                                                                              Times per week: 5 days per week for a minimum of 60 minutes/day plus group as appropriate for 60 minutes.   Treatment to include      Electronically signed by   Karly Manley PT,   11/5/2021, 12:54 PM

## 2021-11-05 NOTE — PROGRESS NOTES
Occupational Therapy    Physical Rehabilitation: OCCUPATIONAL THERAPY     [x] daily progress note       [x] discharge       Patient Name:  Davian Guerrero   :  1945 MRN: [id number]  Room:  [address] [place] [address] Date of Admission: 10/29/2021  Rehabilitation Diagnosis:   Critical illness myopathy [G72.81]  Critical illness myopathy [G72.81]       Date 2021       Day of ARU Week:  1   Time IN//940  1400/1455   Individual Tx Minutes 65+55   Group Tx Minutes    Co-Treat Minutes    Concurrent Tx Minutes    TOTAL Tx Time Mins 120   Variance Time    Variance Time []   Refusal due to:     []   Medical hold/reason:    []   Illness   []   Off Unit for test/procedure  []   Extra time needed to complete task  []   Therapeutic need  []   Other (specify):   Restrictions Restrictions/Precautions: General Precautions, Fall Risk         Communication with other providers: [x]   OK to see per nursing:     []   Spoke with team member regarding:      Subjective observations and cognitive status: AM: Pt seated EOB finishing breakfast on approach, agreeable to tx session    PM: Pt in bed with call light on, reporting need to use r/r     Pain level/location:    /10       Location: Denied   Discharge recommendations  Anticipated discharge date:  Updated to 2021  Destination: []?home alone   []?home alone w assist prn   [x]? home w/ family    []? Continuous supervision       []? SNF    []? Assisted living     []? Other:   Continued therapy: [x]? [facility] OT  []? OUTPATIENT  OT   []? No Further OT  Equipment needs: Shower chair  Pt requires a shower chair to be able to successfully complete ADLs due LE weakness and reduced endurance. Pt is at a great fall risk and would benefit from a shower chair to reduce risk of falls.        ADLs:      Eating: Eating  Assistance Needed: Independent  Comment: x  CARE Score: 6  Discharge Goal: Independent       Oral Hygiene: Oral Hygiene  Assistance Needed: Independent  Comment: in stance at sink  CARE Score: 6  Discharge Goal: Independent    UB/LB Bathing: Shower/Bathe Self  Assistance Needed: Independent  Comment: full shower seated  CARE Score: 6  Discharge Goal: Independent    UB Dressing: Upper Body Dressing  Assistance Needed: Independent  Comment: to don pullover sweatshirt  CARE Score: 6  Discharge Goal: Independent         LB Dressing: Lower Body Dressing  Assistance Needed: Independent  Comment: to don brief and pants  CARE Score: 6  Discharge Goal: Independent    Donning and [place] Footwear: Putting On/Taking Off Footwear  Assistance Needed: Independent  Comment: to doff/don slip on shoes  CARE Score: 6  Discharge Goal: Independent      Toileting, performed in AM and PM: [address] Needed: Independent  Comment: mod I  CARE Score: 6  Discharge Goal: Independent      Toilet Transfers: Toilet Transfer  Assistance Needed: Independent  Comment: mod I c RW, one grab bar to simulate home setup  CARE Score: 6  Discharge Goal: Independent  Device Used:    []   Standard Toilet         [x]   Grab Bars           []  Bedside Commode       []   Elevated Toilet          []   Other:        Bed Mobility:           []   Pt received out of bed   Supine --> Sit:  Mod I   Sit --> Supine: Mod I    Transfers:    Sit--> Stand: Mod I  Stand --> Sit:   Mod I   Stand-Pivot: Mod I   Other:    Assistive device required for transfer:   RW      Functional Mobility:    Assistance: Mod I within room in AM, ~150 ft x2 in hallway in PM  Device:   [x]   Rolling Walker     []   Standard Mary Haynes []   Wheelchair        []   Merribeth Meuse       []   4-Wheeled Mary Haynes         []   Cardiac Mary Haynes       []   Other:         Additional Therapeutic activities/exercises completed this date:     [x]   ADL Training   [x]   Balance/Postural training     [x]   Bed/Transfer Training: As pt has a tub/shower combo at home, educated pt on technique to perform transfer at home and educated on recommendation for shower chair.   Pt completed at a supervision level, still requiring VC for where to position RW and for body mechanics after visual demonstration   [x]   Endurance Training   []   Neuromuscular Re-ed   []   Nu-step:  Time:        Level:         #Steps:       []   Rebounder:    []  Seated     []  Standing        []   Supine Ther Ex (reps/sets):     [x]   Seated Ther Ex (reps/sets): To increase BUE endurance for ADLs and transfers, pt completed 6 sets x10 reps on rickshaw c 20#     []   Standing Ther Ex (reps/sets):     []   Other:      Comments: All intervention performed to increase pt's endurance, ax tolerance, balance,  and I c ADLs/IADLs and functional transfers/mobility. Patient/Caregiver Education and Training:   []   [employer] Equipment Use  [x]   Bed Mobility/Transfer Technique/Safety  []   Energy Conservation Tips  []   Family training  [x]   Postural Awareness  [x]   Safety During Functional Activities  []   Reinforced Patient's Precautions   []   Progress was updated and reviewed in Rehabtracker with patient and/or family this         date. Treatment Plan for Next Session: Planned d/c from ARU next date     Assessment: This pt demonstrated a positive  response to today's treatment as evidenced by meeting OT goals. Ongoing deficits are observed in the areas of endurance and continued focus on this is recommended.        Treatment/Activity Tolerance:   [x] Tolerated treatment with no adverse effects    [] Patient limited by fatigue  [] Patient limited by pain   [] Patient limited by medical complications:    [] Adverse reaction to Tx:   [] Significant change in status    Safety:       [x]  bed alarm set    []  chair alarm set    []  Pt refused alarms                []  Telesitter activated      [x]  Gait belt used during tx session      []other:       Number of Minutes/Billable Intervention  Therapeutic Exercise 20   ADL Self-care 60   Neuro Re-Ed    Therapeutic Activity 40   Group    Other:    TOTAL 120       Social History  Social/Functional History  Lives With: Spouse  Type of Home: House  Home Layout: One level, Laundry in basement  Home Access: Stairs to enter without rails  Entrance Stairs - Number of Steps: 3  Bathroom Shower/Tub: Tub/Shower unit  Bathroom Toilet: Standard  Bathroom Accessibility: Walker accessible  Home Equipment: Cane (Cane available, pt was not using AD PTA)  ADL Assistance: Independent  Homemaking Assistance: Independent  Homemaking Responsibilities: Yes  Ambulation Assistance: Independent  Transfer Assistance: Independent  Active : Yes  Mode of Transportation: Truck, Car  Education: Master's degree - political science  Occupation: Full time employment  Type of occupation:   Leisure & Hobbies: Reading, watching TV, working out at [employer] 3x/week  IADL Comments: Pt manages own medications with pill box. Additional Comments: Pt sleeps on flat bed at baseline. Pt reports no falls in the past 6 months. Objective                                                                                    Goals:  (Update in navigator)  Short term goals  Time Frame for Short term goals: STG=LTG:  Long term goals  Time Frame for Long term goals : 5-7 days or until discharge  Long term goal 1: Pt will perform all grooming tasks with Mod I. MET  Long term goal 2: Pt will perform UB dressing with Mod I. MET  Long term goal 3: Pt will perform LB dressing with Mod I. MET  Long term goal 4: Pt will don/doff footwear with Mod I. MET  Long term goal 5: Pt will perform bathing with Mod I. MET  Long term goals 6: Pt will perform toileting with Mod I. MET  Long term goal 7: Pt will perform all fxl transfers (toilet, shower, etc.) with Mod I, DME PRN.  MET  Long term goal 8: Pt will participate in therex/theract with emphasis on dynamic stance >10-12 min to increase endurance and dynamic standing tolerance needed for ADLs/IADLs:     MET    Plan of Care Times per week: 5 days per week for a minimum of 60 minutes/day plus group as appropriate for 60 minutes. Treatment to include Plan  Times per week: 5x  Times per day: Daily  Plan weeks: 5-7 days or until discharge  Current Treatment Recommendations: Strengthening, Functional Mobility Training, Gait Training, Patient/Caregiver Education & Training, Home Management Training, Endurance Training, Stair training, Pain Management, Equipment Evaluation, Education, & procurement, Balance Training, Safety Education & Training, Self-Care / ADL    Electronically signed by   Fortino Munson MS, OTR/L  License #[id number]  11/5/2021, 9:19 AM

## 2021-11-05 NOTE — PLAN OF CARE
Problem: Falls - Risk of:  Goal: Will remain free from falls  Description: Will remain free from falls  11/5/2021 1115 by Oumou Monsalve RN  Outcome: Ongoing  11/4/2021 2320 by Carroll Lacy  Outcome: Ongoing  Goal: Absence of physical injury  Description: Absence of physical injury  11/5/2021 1115 by Oumou Monsalve RN  Outcome: Ongoing  11/4/2021 2320 by Carroll Lacy  Outcome: Ongoing     Problem: Pain:  Goal: Pain level will decrease  Description: Pain level will decrease  11/5/2021 1115 by Oumou Monsalve RN  Outcome: Ongoing  11/4/2021 2320 by Carroll Lacy  Outcome: Ongoing  Goal: Control of acute pain  Description: Control of acute pain  11/5/2021 1115 by Oumou Monsalve RN  Outcome: Ongoing  11/4/2021 2320 by Carroll Lacy  Outcome: Ongoing  Goal: Control of chronic pain  Description: Control of chronic pain  11/5/2021 1115 by Oumou Monsalve RN  Outcome: Ongoing  11/4/2021 2320 by Carroll Lacy  Outcome: Ongoing  Goal: Patient's pain/discomfort is manageable  Description: Patient's pain/discomfort is manageable  Outcome: Ongoing     Problem: Infection:  Goal: Will remain free from infection  Description: Will remain free from infection  Outcome: Ongoing     Problem: Safety:  Goal: Free from accidental physical injury  Description: Free from accidental physical injury  Outcome: Ongoing  Goal: Free from intentional harm  Description: Free from intentional harm  Outcome: Ongoing     Problem: Daily Care:  Goal: Daily care needs are met  Description: Daily care needs are met  Outcome: Ongoing     Problem: Skin Integrity:  Goal: Skin integrity will stabilize  Description: Skin integrity will stabilize  Outcome: Ongoing     Problem: Discharge Planning:  Goal: Patients continuum of care needs are met  Description: Patients continuum of care needs are met  Outcome: Ongoing

## 2021-11-05 NOTE — PROGRESS NOTES
Lita Failing    : 1945  Acct #: [de-identified]  MRN: [id number]              PM&R Progress Note      Admitting diagnosis: Critical illness myopathy ( [address])     Comorbid diagnoses impacting rehabilitation: Generalized weakness, gait disturbance, uncontrolled pain, prostate cancer with radical prostatectomy on 10/11/2021, small bowel perforation with resection on 10/16/2021, paroxysmal atrial fibrillation, acute kidney injury, essential hypertension, diverticulitis, history of DVT     Chief complaint: Anxious about discharge. No significant abdominal pain now. Prior (baseline) level of function: Independent. Current level of function:         Current  IRF-MAK and Goals:   Occupational Therapy:    Short term goals  Time Frame for Short term goals: STG=LTG :   Long term goals  Time Frame for Long term goals : 5-7 days or until discharge  Long term goal 1: Pt will perform all grooming tasks with Mod I.  Long term goal 2: Pt will perform UB dressing with Mod I.  Long term goal 3: Pt will perform LB dressing with Mod I.  Long term goal 4: Pt will don/doff footwear with Mod I.  Long term goal 5: Pt will perform bathing with Mod I.  Long term goals 6: Pt will perform toileting with Mod I.  Long term goal 7: Pt will perform all fxl transfers (toilet, shower, etc.) with Mod I, DME PRN.   Long term goal 8: Pt will participate in therex/theract with emphasis on dynamic stance >10-12 min to increase endurance and dynamic standing tolerance needed for ADLs/IADLs :                                       Eating: Eating  Assistance Needed: Independent  Comment: Pt able to bring food to mouth with utensil and open containers  CARE Score: 6  Discharge Goal: Independent       Oral Hygiene: Oral Hygiene  Assistance Needed: Independent  Comment: Completed sitting at sink  CARE Score: 6  Discharge Goal: Independent    UB/LB Bathing: Shower/Bathe Self  Assistance Needed: Independent  Comment: Completed full with IND sitting on shower bench. CARE Score: 6  Discharge Goal: Independent    UB Dressing: Upper Body Dressing  Assistance Needed: Independent  Comment: Pt completed UB dressing without assistance  CARE Score: 6  Discharge Goal: Independent         LB Dressing: Lower Body Dressing  Assistance Needed: Independent  Comment: Pt completed threading briefs and pants sitting and  standing to complete clothing managment  CARE Score: 6  Discharge Goal: Independent    Donning and [place] Footwear: Putting On/Taking Off Footwear  Assistance Needed: Independent  Comment: Donned and doffed slip on slip on shoes with no assistance  CARE Score: 6  Discharge Goal: Independent      Toileting: Toileting Hygiene  Assistance Needed: Supervision or touching assistance  Comment: completed clothing managment with SUP and complete toilet hygiene sitting with IND. Reason if not Attempted: Patient refused  CARE Score: 4  Discharge Goal: Independent      Toilet Transfers: Toilet Transfer  Assistance Needed: Supervision or touching assistance  Comment: With use of Bilateral grab bars and RW; SUP due to patient being unsteady at first  CARE Score: 4  Discharge Goal: Independent    Physical Therapy:   Short term goals  Time Frame for Short term goals: about one week  Short term goal 1: indep all bed mobility and bed xfers  Short term goal 2: indep all basic sit-stand xfers from bed/chair/car, supervision for floor xfer  Short term goal 3: amb 150 feet without device, with good balance, independently  Short term goal 4: up and down 12 stairs with modified independence  Short term goal 5: indep with HEP for general conditioning and endurance. Long term goals  Time Frame for Long term goals : anticipate 7 days.       Bed Mobility:   Sit to Lying  Assistance Needed: Independent  Comment: without bed features  CARE Score: 6  Discharge Goal: Independent  Roll Left and Right  Assistance Needed: Independent  Comment: without bed features  CARE Score: 6  Discharge Goal: Independent  Lying to Sitting on Side of Bed  Assistance Needed: Independent  Physical Assistance Level: No physical assistance  Comment: without bed features  CARE Score: 6  Discharge Goal: Independent    Transfers:    Sit to Stand  Assistance Needed: Independent  Comment: with RW  CARE Score: 6  Discharge Goal: Independent  Chair/Bed-to-Chair Transfer  Assistance Needed: Supervision or touching assistance  Comment: Supervision for balance heriberto with fatigue with use of RW  CARE Score: 4  Discharge Goal: Independent  Toilet Transfer  Assistance Needed: Supervision or touching assistance  Comment: Supervision with RW for approach  CARE Score: 4  Car Transfer  Assistance Needed: Supervision or touching assistance  Comment: Supervision for approach with RW for balance, lifts LEs in/out of car without A  CARE Score: 4  Discharge Goal: Independent    Ambulation:    Walking Ability  Does the Patient Walk?: Yes     Walk 10 Feet  Assistance Needed: Independent  Physical Assistance Level: No physical assistance  Comment: with RW, demos safety and balance  CARE Score: 6  Discharge Goal: Independent     Walk 50 Feet with Two Turns  Assistance Needed: Supervision or touching assistance  Physical Assistance Level: No physical assistance  Comment: Supervision req cue to LOB with turning when crossing feet req safety cues.  Pt able to self correct balance  CARE Score: 4  Discharge Goal: Independent     Walk 150 Feet  Assistance Needed: Supervision or touching assistance  Physical Assistance Level: No physical assistance  Comment: Supervision for balance with RW  Reason if not Attempted: Not attempted due to medical condition or safety concerns  CARE Score: 4  Discharge Goal: Independent     Walking 10 Feet on Uneven Surfaces  Assistance Needed: Supervision or touching assistance  Physical Assistance Level: No physical assistance  Comment: Supervision for balance with use of RW,  CARE Score: 4  Discharge Goal: Independent     1 Step (Curb)  Assistance Needed: Supervision or touching assistance  Physical Assistance Level: No physical assistance  Comment: SBA with RW, demos safety without cuein  CARE Score: 4  Discharge Goal: Independent     4 Steps  Assistance Needed: Independent  Physical Assistance Level: No physical assistance  Comment: with B rails in recip pattern  CARE Score: 6  Discharge Goal: Independent     12 Steps  Assistance Needed: Supervision or touching assistance  Physical Assistance Level: No physical assistance  Comment: SB-Supervision due to fatigue, needing to finsh amb last 4 steps in non-recip pattern, req extra time. Reason if not Attempted: Not attempted due to medical condition or safety concerns  CARE Score: 4  Discharge Goal: Independent       Wheelchair:  w/c Ability: Wheelchair Ability  Uses a Wheelchair and/or Scooter?: No                Balance:        Object: Picking Up Object  Assistance Needed: Independent  Physical Assistance Level: No physical assistance  Comment: at RW using AE  CARE Score: 6  Discharge Goal: Independent    I      Exam:    Blood pressure 120/71, pulse 99, temperature 98.6 °F (37 °C), temperature source Oral, resp. rate 16, height 6' 1\" (1.854 m), weight 193 lb 5.5 oz (87.7 kg), SpO2 94 %. General: Sitting up in a wheelchair. Using his arms to propel it some. Alert. HEENT: Neck supple. No JVD.  MMM. Pulmonary: Clear and unlabored. Cardiac: Occasional premature beat. Rate controlled. Abdomen: Patient's abdomen is soft and nondistended. Bowel sounds were present throughout. There was no rebound, guarding or masses noted. Incision nontender and dry. Upper extremities: Better dexterity and strength. Lower extremities: 4 -/5 strength throughout. Sitting balance was good.   Standing balance was fair-.    Lab Results   Component Value Date    WBC 6.6 10/31/2021    HGB 9.5 (L) 10/31/2021    HCT 29.9 (L) 10/31/2021    MCV 91.2 10/31/2021     10/31/2021 Lab Results   Component Value Date    INR 1.11 08/17/2021    INR 1.15 05/05/2021    INR 1.20 10/14/2017    PROTIME 14.3 08/17/2021    PROTIME 13.9 05/05/2021    PROTIME 13.7 (H) 10/14/2017     Lab Results   Component Value Date    CREATININE 0.7 (L) 10/31/2021    BUN 10 10/31/2021     (L) 10/31/2021    K 4.2 10/31/2021     10/31/2021    CO2 24 10/31/2021     Lab Results   Component Value Date    ALT 26 05/06/2021    AST 24 05/06/2021    ALKPHOS 51 05/06/2021    BILITOT 0.8 05/06/2021       Expected length of stay  prior to a supervised level of function for discharge home with a walker and [address] OT/PT is 11/6/2021. Recommendations:    1. Critical illness myopathy with gait disturbance:    Continues to engage well in the daily occupational and physical therapy.    Good attention to the adaptive equipment training and diet education. Continent of bowel and bladder. No signs of wound infection. Some flatus is present. Verbal cues and SBA for transfers today. 2. DVT prophylaxis: Lovenox 40 mg subcu daily up until now. I will stop the Lovenox as his walking distances are quite protective now. 3. Uncontrolled pain: Acetaminophen, muscle relaxants and occasional oxycodone use.  Bowel intervention while on the narcotics.  Progressive mobilization. 4. Radical prostatectomy: Tolerating the Flomax.   So far he has demonstrated steady urinary output without dysuria.  Urology follow-up as an outpatient. 5. Small bowel perforation with moderate protein calorie malnutrition: Drain sites and wound seem to be healing.  Increasing his oral intake.  Outpatient follow-up with general surgery. 6. Paroxysmal atrial fibrillation: Coreg for rate control.  Daily weights do not reveal any decompensation of CHF.  Vital signs are checked at rest and with activity.   7.   Hypertension: His Coreg also helps manage his systolic blood pressure.  Target systolic blood pressure ranges 120-140.  Vital signs are checked at rest and with activity.  Blood pressure is above the target range.  Monitoring closely. 8. History of DVT: Progressive mobilization.  No long-term anticoagulation given. We will have him follow-up with his PCP. .

## 2021-11-06 VITALS
RESPIRATION RATE: 18 BRPM | SYSTOLIC BLOOD PRESSURE: 97 MMHG | BODY MASS INDEX: 24.78 KG/M2 | WEIGHT: 186.95 LBS | DIASTOLIC BLOOD PRESSURE: 58 MMHG | HEART RATE: 98 BPM | TEMPERATURE: 98.2 F | OXYGEN SATURATION: 98 % | HEIGHT: 73 IN

## 2021-11-06 PROCEDURE — 6370000000 HC RX 637 (ALT 250 FOR IP): Performed by: PHYSICAL MEDICINE & REHABILITATION

## 2021-11-06 PROCEDURE — 99239 HOSP IP/OBS DSCHRG MGMT >30: CPT | Performed by: PHYSICAL MEDICINE & REHABILITATION

## 2021-11-06 RX ORDER — SENNA PLUS 8.6 MG/1
2 TABLET ORAL NIGHTLY PRN
Qty: 60 TABLET | Refills: 0 | COMMUNITY
Start: 2021-11-06 | End: 2021-12-06

## 2021-11-06 RX ORDER — DOCUSATE SODIUM 100 MG/1
100 CAPSULE, LIQUID FILLED ORAL 2 TIMES DAILY
COMMUNITY
Start: 2021-11-06

## 2021-11-06 RX ADMIN — CARVEDILOL 3.12 MG: 6.25 TABLET, FILM COATED ORAL at 09:38

## 2021-11-06 RX ADMIN — ASPIRIN 81 MG: 81 TABLET, CHEWABLE ORAL at 09:38

## 2021-11-06 RX ADMIN — CYCLOBENZAPRINE HYDROCHLORIDE 5 MG: 10 TABLET, FILM COATED ORAL at 01:10

## 2021-11-06 RX ADMIN — ONDANSETRON 4 MG: 4 TABLET, ORALLY DISINTEGRATING ORAL at 00:07

## 2021-11-06 RX ADMIN — TAMSULOSIN HYDROCHLORIDE 0.4 MG: 0.4 CAPSULE ORAL at 09:39

## 2021-11-06 RX ADMIN — DOCUSATE SODIUM 100 MG: 100 CAPSULE ORAL at 09:38

## 2021-11-06 RX ADMIN — CLOPIDOGREL BISULFATE 75 MG: 75 TABLET ORAL at 09:38

## 2021-11-06 RX ADMIN — ACETAMINOPHEN 650 MG: 325 TABLET ORAL at 12:30

## 2021-11-06 ASSESSMENT — PAIN SCALES - GENERAL
PAINLEVEL_OUTOF10: 2
PAINLEVEL_OUTOF10: 0
PAINLEVEL_OUTOF10: 5

## 2021-11-06 NOTE — DISCHARGE INSTR - COC
Continuity of Care Form    Patient Name: Hari Orona   :  1945  MRN:  [id number]    Admit date:  10/29/2021  Discharge date:  ***    Code Status Order: Full Code   Advance Directives:      Admitting Physician:  Lin Hernandez MD  PCP: Samanta Coleman MD    Discharging Nurse: [facility] Unit/Room#: 1020/1020-A  Discharging Unit Phone Number: ***    Emergency Contact:   Extended Emergency Contact Information  Primary Emergency Contact: Lady Blue  Address: [address] [place] [address], [address] [place] [address] [address] Phone: [phone]  Mobile Phone: [phone]  Relation: Spouse    Past Surgical History:  Past Surgical History:   Procedure Laterality Date    CARDIAC CATHETERIZATION      COLONOSCOPY  Last Done In     Polyps Removed In Past    DENTAL SURGERY      Teeth Extracted In Past    [facility] Right 2018    PROSTATE BIOPSY  ,     \"Prostate Cancer Dx In 2016\"    [address]       Immunization History:   Immunization History   Administered Date(s) Administered    COVID-19, Pfizer, PF, 30mcg/0.3mL 2021, 2021, 10/02/2021    Pneumococcal Polysaccharide (Dathwepvq51) 2015       Active Problems:  Patient Active Problem List   Diagnosis Code    Chest pain at rest R07.9    DVT of upper extremity (deep vein thrombosis) (HCC) I82.629    Prostate cancer ([address] C61    Neck pain on left side M54.2    Essential hypertension I10    Hyperlipemia E78.5    Idiopathic parathyroidism ([facility]) E20.9    Hyperparathyroidism ([address] E21.3    Angina pectoris ([address] I20.9    CAD in native artery I25.10    Chest pain R07.9    Critical illness myopathy G72.81    Generalized weakness R53.1    Uncontrolled pain R52    Small bowel perforation ([address] K63.1    Acute kidney injury (SUSY) with acute tubular necrosis (ATN) ([facility]) N17.0    Paroxysmal atrial fibrillation ([facility]) I48.0    Diverticulitis K57.92    History of DVT (deep vein thrombosis) Z86.718    Mild protein-calorie malnutrition (HCC) E44.1    Moderate malnutrition (HCC) E44.0       Isolation/Infection:   Isolation            No Isolation          Patient Infection Status       Infection Onset Added Last Indicated Last Indicated By Review Planned Expiration Resolved Resolved By    None active    Resolved    C-diff Rule Out 21 C difficile Molecular/PCR (Ordered)   21 Tanya Fernandez LPN            Nurse Assessment:  Last Vital Signs: BP (!) 97/58   Pulse 98   Temp 98.2 °F (36.8 °C) (Oral)   Resp 18   Ht 6' 1\" (1.854 m)   Wt 186 lb 15.2 oz (84.8 kg)   SpO2 98%   BMI 24.67 kg/m²     Last documented pain score (0-10 scale): Pain Level: 5  Last Weight:   Wt Readings from Last 1 Encounters:   21 186 lb 15.2 oz (84.8 kg)     Mental Status:  {IP PT MENTAL STATUS:}    IV Access:  { SHERINE IV ACCESS:927843350}    Nursing Mobility/ADLs:  Walking   {CHP DME XNCM:963014443}  Transfer  {CHP DME BBHQ:193940254}  Bathing  {CHP DME YH}  Dressing  {CHP DME DRFK:671779772}  Toileting  {CHP DME KGJZ:819675100}  Feeding  {P DME EINI:183474322}  Med Admin  {[facility] DME DFNA:182495343}  Med Delivery   { SHERINE MED Delivery:698694735}    Wound Care Documentation and Therapy:        Elimination:  Continence: Bowel: {YES / WX:10662}  Bladder: {YES / IZ:79863}  Urinary Catheter: {Urinary Catheter:975187420}   Colostomy/Ileostomy/Ileal Conduit: {YES / ON:56465}       Date of Last BM: ***    Intake/Output Summary (Last 24 hours) at 2021 1238  Last data filed at 2021 0828  Gross per 24 hour   Intake --   Output 150 ml   Net -150 ml     I/O last 3 completed shifts:   In: 240 [P.O.:240]  Out: 150 [Urine:150]    Safety Concerns:     [address] SHERINE Safety Concerns:631837712}    Impairments/Disabilities:      [address] Impairments/Disabilities:244796121}    Nutrition Therapy:  Current Nutrition Therapy:   [address] BASURTO Diet List:857232522}    Routes of Feeding: {CHP DME Other Feedings:810236647}  Liquids: {Slp liquid thickness:97986}  Daily Fluid Restriction: {CHP DME Yes amt example:418070869}  Last Modified Barium Swallow with Video (Video Swallowing Test): {Done Not Done HTPR:152905682}    Treatments at the Time of Hospital Discharge:   Respiratory Treatments: ***  Oxygen Therapy:  {Therapy; copd oxygen:55885}  Ventilator:    {[facility] Vent XEVR:507726658}    Rehab Therapies: {THERAPEUTIC INTERVENTION:7785118203}  Weight Bearing Status/Restrictions: { CC Weight Bearin}  Other Medical Equipment (for information only, NOT a DME order):  {EQUIPMENT:955998611}  Other Treatments: ***    Patient's personal belongings (please select all that are sent with patient):  {CHP DME Belongings:549478203}    RN SIGNATURE:  {Esignature:227304756}    CASE MANAGEMENT/SOCIAL WORK SECTION    Inpatient Status Date: ***    Readmission Risk Assessment Score:  Readmission Risk              Risk of Unplanned Readmission:  9           Discharging to Facility/ Agency   Name:   Address:  Phone:  Fax:    Dialysis Facility (if applicable)   Name:  Address:  Dialysis Schedule:  Phone:  Fax:    / signature: {Esignature:207327931}    PHYSICIAN SECTION    Prognosis: {Prognosis:2480464810}    Condition at Discharge: [facility] Patient Condition:079950551}    Rehab Potential (if transferring to Rehab): {Prognosis:1771331916}    Recommended Labs or Other Treatments After Discharge: ***    Physician Certification: I certify the above information and transfer of Kennedi Hurtado  is necessary for the continuing treatment of the diagnosis listed and that he requires {Admit to Appropriate Level of Care:33445} for {GREATER/LESS:000533338} 30 days.      Update Admission H&P: {CHP DME Changes in LSQJD:280686268}    PHYSICIAN SIGNATURE:  {Esignature:250970518}

## 2021-11-06 NOTE — PROGRESS NOTES
Score: 6  Discharge Goal: Independent    UB Dressing: Upper Body Dressing  Assistance Needed: Independent  Comment: to don pullover sweatshirt  CARE Score: 6  Discharge Goal: Independent         LB Dressing: Lower Body Dressing  Assistance Needed: Independent  Comment: to don brief and pants  CARE Score: 6  Discharge Goal: Independent    Donning and Merlin Footwear: Putting On/Taking Off Footwear  Assistance Needed: Independent  Comment: to doff/don slip on shoes  CARE Score: 6  Discharge Goal: Independent      Toileting: Toileting Hygiene  Assistance Needed: Independent  Comment: mod I  Reason if not Attempted: Patient refused  CARE Score: 6  Discharge Goal: Independent      Toilet Transfers: Toilet Transfer  Assistance Needed: Independent  Comment: mod I c RW, one grab bar to simulate home setup  CARE Score: 6  Discharge Goal: Independent    Physical Therapy:   Short term goals  Time Frame for Short term goals: about one week  Short term goal 1: indep all bed mobility and bed xfers  Short term goal 2: indep all basic sit-stand xfers from bed/chair/car, supervision for floor xfer  Short term goal 3: amb 150 feet without device, with good balance, independently  Short term goal 4: up and down 12 stairs with modified independence  Short term goal 5: indep with HEP for general conditioning and endurance. Long term goals  Time Frame for Long term goals : anticipate 7 days.       Bed Mobility:   Sit to Lying  Assistance Needed: Independent  Comment: without bed features  CARE Score: 6  Discharge Goal: Independent  Roll Left and Right  Assistance Needed: Independent  Comment: without bed features  CARE Score: 6  Discharge Goal: Independent  Lying to Sitting on Side of Bed  Assistance Needed: Independent  Physical Assistance Level: No physical assistance  Comment: without bed features  CARE Score: 6  Discharge Goal: Independent    Transfers:    Sit to Stand  Assistance Needed: Independent  Comment: with RW  CARE Score: 6  Discharge Goal: Independent  Chair/Bed-to-Chair Transfer  Assistance Needed: Independent  Comment: Supervision for balance heriberto with fatigue with use of RW  CARE Score: 6  Discharge Goal: Independent  Toilet Transfer  Assistance Needed: Supervision or touching assistance  Comment: Supervision with RW for approach  CARE Score: 4  Car Transfer  Assistance Needed: Supervision or touching assistance  Comment: Supervision for approach with RW for balance, lifts LEs in/out of car without A  CARE Score: 4  Discharge Goal: Independent    Ambulation:    Walking Ability  Does the Patient Walk?: Yes     Walk 10 Feet  Assistance Needed: Supervision or touching assistance  Physical Assistance Level: No physical assistance  Comment: low BP today, pt not confident  CARE Score: 4  Discharge Goal: Independent     Walk 50 Feet with Two Turns  Assistance Needed: Supervision or touching assistance  Physical Assistance Level: No physical assistance  Comment: Supervision req cue to LOB with turning when crossing feet req safety cues.  Pt able to self correct balance  CARE Score: 4  Discharge Goal: Independent     Walk 150 Feet  Assistance Needed: Supervision or touching assistance  Physical Assistance Level: No physical assistance  Comment: Supervision for balance with RW  Reason if not Attempted: Not attempted due to medical condition or safety concerns  CARE Score: 4  Discharge Goal: Independent     Walking 10 Feet on Uneven Surfaces  Assistance Needed: Supervision or touching assistance  Physical Assistance Level: No physical assistance  Comment: Supervision for balance with use of RW,  CARE Score: 4  Discharge Goal: Independent     1 Step (Curb)  Assistance Needed: Supervision or touching assistance  Physical Assistance Level: No physical assistance  Comment: SBA with RW, demos safety without cuein  CARE Score: 4  Discharge Goal: Independent     4 Steps  Assistance Needed: Supervision or touching assistance  Physical Assistance Level: No physical assistance  Comment: with B rails in recip pattern  CARE Score: 4  Discharge Goal: Independent     12 Steps  Assistance Needed: Supervision or touching assistance  Physical Assistance Level: No physical assistance  Comment: SB-Supervision due to fatigue, needing to finsh amb last 4 steps in non-recip pattern, req extra time. Reason if not Attempted: Not attempted due to medical condition or safety concerns  CARE Score: 4  Discharge Goal: Independent       Wheelchair:  w/c Ability: Wheelchair Ability  Uses a Wheelchair and/or Scooter?: No                Balance:        Object: Picking Up Object  Assistance Needed: Independent  Physical Assistance Level: No physical assistance  Comment: at RW using AE  CARE Score: 6  Discharge Goal: Independent    I      Exam:    Blood pressure 110/68, pulse 88, temperature 96.4 °F (35.8 °C), temperature source Oral, resp. rate 16, height 6' 1\" (1.854 m), weight 188 lb 7.9 oz (85.5 kg), SpO2 96 %. General: Resting in bed. Quiet. Oriented. HEENT: MMM. Neck supple. Pulmonary: Clear. Cardiac: Controlled rate. Abdomen: Patient's abdomen is soft and nondistended. Bowel sounds were present throughout. There was no rebound, guarding or masses noted. Upper extremities: No new bruising or swelling. Lower extremities: No signs of DVT. Sitting balance was good.   Standing balance was fair-.    Lab Results   Component Value Date    WBC 6.6 10/31/2021    HGB 9.5 (L) 10/31/2021    HCT 29.9 (L) 10/31/2021    MCV 91.2 10/31/2021     10/31/2021     Lab Results   Component Value Date    INR 1.11 08/17/2021    INR 1.15 05/05/2021    INR 1.20 10/14/2017    PROTIME 14.3 08/17/2021    PROTIME 13.9 05/05/2021    PROTIME 13.7 (H) 10/14/2017     Lab Results   Component Value Date    CREATININE 0.7 (L) 10/31/2021    BUN 10 10/31/2021     (L) 10/31/2021    K 4.2 10/31/2021     10/31/2021    CO2 24 10/31/2021     Lab Results Component Value Date    ALT 26 05/06/2021    AST 24 05/06/2021    ALKPHOS 51 05/06/2021    BILITOT 0.8 05/06/2021       Expected length of stay  prior to a supervised level of function for discharge home with a walker and [facility] OT/PT is 11/6/2021. Recommendations:    1. Critical illness myopathy with gait disturbance:    Continues to engage well in the daily occupational and physical therapy.    Good attention to the adaptive equipment training and diet education.  Continent of bowel and bladder.  No signs of wound infection. Some flatus is present. Verbal cues and SBA for transfers today. 2. DVT prophylaxis: Lovenox 40 mg subcu daily up until now. I will stop the Lovenox as his walking distances are quite protective now.   3. Uncontrolled pain: Acetaminophen, muscle relaxants and occasional oxycodone use.  Bowel intervention while on the narcotics.  Progressive mobilization. 4. Radical prostatectomy: Tolerating the Flomax.   So far he has demonstrated steady urinary output without dysuria.  Urology follow-up as an outpatient. 5. Small bowel perforation with moderate protein calorie malnutrition: Drain sites and wound have healed well. He is now consistent with his oral intake.  Outpatient follow-up with general surgery. 6. Paroxysmal atrial fibrillation: Coreg for rate control.  Daily weights do not reveal any decompensation of CHF.  Vital signs are checked at rest and with activity. 7.   Hypertension: His Coreg also helps manage his systolic blood pressure.  Target systolic blood pressure ranges 120-140.  Vital signs are checked at rest and with activity.  Blood pressure is just below the target range.  Monitoring closely. 8. History of DVT: Progressive mobilization.  No long-term anticoagulation given. We will have him follow-up with his PCP. .                 This is a late entry note for service provided 11/5/2021.

## 2021-11-06 NOTE — PROGRESS NOTES
Discharge instructions provided and reviewed with pt and wife. Wife asked a few questions and this RN provided appropriate answers and review. Wife states she will notify Nhung Will upon arrival at home. Meds reviewed with pt and wife. Nathalie Cabral in room sent from [facility] and will go home with pt. 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 Sandrita was diagnosed with PANDAS at 9.5 years of age and was treated with antibiotics for over 6 months. During this course, Sandrita reportedly had a very poor appetite and did not gain weight between the ages of 10 and 11 years. Her weight had declined from the 5th-10th to below the 3rd percentile. Her height was near the 10th percentile, but then declined gradually to below the curve. When her weight improved, her growth started to improve but height was along a lower percentile. A bone age performed on 1/16/15 was read as almost two years delayed. All laboratory results were normal. On examination her height was at the 2%, BMI was at the 27%. She was prepubertal. \par \par Sandrita was subsequently hospitalized at [facility] for malnutrition due to dietary restriction (diagnosed with ARFID, OCD, anxiety) and has been followed closely in the eating disorder program as an outpatient with successful weight gain. Repeat bone age was delayed at 10-11 years. Growth screening labs were normal.\par \par In July, 2016 she was noted to be early pubertal and growth velocity improved. Bone age then advanced to closer to her chronological age and was read as 12<13 years. Growth hormone stimulation test to evaluate for growth hormone deficiency showed her to be deficient with a peak GH level of 8 ng/mL. She had a normal brain MRI. GH was started in 8/17. She was last seen in 5/18 at which time growth velocity was good 6.3 cm/yr and her growth hormone dose was increased to 2.3 mg daily. Results of laboratory testing were normal in January 2018; she was noted to have a goiter on exam and TFTs were normal, Ab negative.\par \par She is taking Nutropin 2.3 mg daily (0.32 mg/kg/wk) with rare missed doses. She continues to have regular periods. She presents with mother at today's visit doing well without any complaints. At last visit, she was growing at an excellent velocity of 9.65 cm/year and she was maintained on the same dose of GH 0.32 mg/kg/week. Surveillance blood work completed in 9/18/2018 showed an HbA1c 5.1%, CMP normal, TSH 3.25 uIU/ml, T4 6.3 ug/dl, Cortisol 12.5 ug/dl, IGF-1 590 ng/ml. [FreeTextEntry1] : LMP: 9/11/2018

## 2021-11-08 ENCOUNTER — HOSPITAL ENCOUNTER (EMERGENCY)
Age: 76
Discharge: HOME | DRG: 312 | End: 2021-11-08
Attending: EMERGENCY MEDICINE
Payer: MEDICARE

## 2021-11-08 ENCOUNTER — APPOINTMENT (OUTPATIENT)
Dept: CT IMAGING | Age: 76
DRG: 312 | End: 2021-11-08
Payer: MEDICARE

## 2021-11-08 ENCOUNTER — APPOINTMENT (OUTPATIENT)
Dept: GENERAL RADIOLOGY | Age: 76
DRG: 312 | End: 2021-11-08
Payer: MEDICARE

## 2021-11-08 VITALS
DIASTOLIC BLOOD PRESSURE: 72 MMHG | HEART RATE: 85 BPM | RESPIRATION RATE: 16 BRPM | BODY MASS INDEX: 24.65 KG/M2 | HEIGHT: 73 IN | SYSTOLIC BLOOD PRESSURE: 123 MMHG | TEMPERATURE: 99.1 F | WEIGHT: 186 LBS | OXYGEN SATURATION: 100 %

## 2021-11-08 DIAGNOSIS — R18.8 ABDOMINAL FLUID COLLECTION: ICD-10-CM

## 2021-11-08 DIAGNOSIS — R55 SYNCOPE AND COLLAPSE: Primary | ICD-10-CM

## 2021-11-08 LAB
ALBUMIN SERPL-MCNC: 3.1 GM/DL (ref 3.4–5)
ALP BLD-CCNC: 202 IU/L (ref 40–129)
ALT SERPL-CCNC: 39 U/L (ref 10–40)
ANION GAP SERPL CALCULATED.3IONS-SCNC: 15 MMOL/L (ref 4–16)
AST SERPL-CCNC: 29 IU/L (ref 15–37)
BASOPHILS ABSOLUTE: 0 K/CU MM
BASOPHILS RELATIVE PERCENT: 0.2 % (ref 0–1)
BILIRUB SERPL-MCNC: 0.7 MG/DL (ref 0–1)
BUN BLDV-MCNC: 16 MG/DL (ref 6–23)
CALCIUM SERPL-MCNC: 9.1 MG/DL (ref 8.3–10.6)
CHLORIDE BLD-SCNC: 98 MMOL/L (ref 99–110)
CHP ED QC CHECK: YES
CO2: 22 MMOL/L (ref 21–32)
CREAT SERPL-MCNC: 0.9 MG/DL (ref 0.9–1.3)
DIFFERENTIAL TYPE: ABNORMAL
EKG ATRIAL RATE: 84 BPM
EKG DIAGNOSIS: NORMAL
EKG P AXIS: 54 DEGREES
EKG P-R INTERVAL: 176 MS
EKG Q-T INTERVAL: 382 MS
EKG QRS DURATION: 98 MS
EKG QTC CALCULATION (BAZETT): 451 MS
EKG R AXIS: -24 DEGREES
EKG T AXIS: 27 DEGREES
EKG VENTRICULAR RATE: 84 BPM
EOSINOPHILS ABSOLUTE: 0.1 K/CU MM
EOSINOPHILS RELATIVE PERCENT: 0.5 % (ref 0–3)
GFR AFRICAN AMERICAN: >60 ML/MIN/1.73M2
GFR NON-AFRICAN AMERICAN: >60 ML/MIN/1.73M2
GLUCOSE BLD-MCNC: 113 MG/DL (ref 70–99)
GLUCOSE BLD-MCNC: 92 MG/DL
GLUCOSE BLD-MCNC: 92 MG/DL (ref 70–99)
HCT VFR BLD CALC: 34.2 % (ref 42–52)
HEMOGLOBIN: 10.8 GM/DL (ref 13.5–18)
IMMATURE NEUTROPHIL %: 0.3 % (ref 0–0.43)
LYMPHOCYTES ABSOLUTE: 1.4 K/CU MM
LYMPHOCYTES RELATIVE PERCENT: 11.2 % (ref 24–44)
MCH RBC QN AUTO: 28.7 PG (ref 27–31)
MCHC RBC AUTO-ENTMCNC: 31.6 % (ref 32–36)
MCV RBC AUTO: 91 FL (ref 78–100)
MONOCYTES ABSOLUTE: 0.9 K/CU MM
MONOCYTES RELATIVE PERCENT: 7.7 % (ref 0–4)
NUCLEATED RBC %: 0 %
PDW BLD-RTO: 14.4 % (ref 11.7–14.9)
PLATELET # BLD: 501 K/CU MM (ref 140–440)
PMV BLD AUTO: 9.3 FL (ref 7.5–11.1)
POTASSIUM SERPL-SCNC: 4.6 MMOL/L (ref 3.5–5.1)
PRO-BNP: 188.7 PG/ML
RBC # BLD: 3.76 M/CU MM (ref 4.6–6.2)
SEGMENTED NEUTROPHILS ABSOLUTE COUNT: 9.8 K/CU MM
SEGMENTED NEUTROPHILS RELATIVE PERCENT: 80.1 % (ref 36–66)
SODIUM BLD-SCNC: 135 MMOL/L (ref 135–145)
TOTAL IMMATURE NEUTOROPHIL: 0.04 K/CU MM
TOTAL NUCLEATED RBC: 0 K/CU MM
TOTAL PROTEIN: 7.3 GM/DL (ref 6.4–8.2)
TROPONIN T: <0.01 NG/ML
WBC # BLD: 12.3 K/CU MM (ref 4–10.5)

## 2021-11-08 PROCEDURE — 99285 EMERGENCY DEPT VISIT HI MDM: CPT

## 2021-11-08 PROCEDURE — 85379 FIBRIN DEGRADATION QUANT: CPT

## 2021-11-08 PROCEDURE — 6360000004 HC RX CONTRAST MEDICATION: Performed by: EMERGENCY MEDICINE

## 2021-11-08 PROCEDURE — 93005 ELECTROCARDIOGRAM TRACING: CPT | Performed by: EMERGENCY MEDICINE

## 2021-11-08 PROCEDURE — 93010 ELECTROCARDIOGRAM REPORT: CPT | Performed by: INTERNAL MEDICINE

## 2021-11-08 PROCEDURE — 71046 X-RAY EXAM CHEST 2 VIEWS: CPT

## 2021-11-08 PROCEDURE — 2580000003 HC RX 258: Performed by: EMERGENCY MEDICINE

## 2021-11-08 PROCEDURE — 85025 COMPLETE CBC W/AUTO DIFF WBC: CPT

## 2021-11-08 PROCEDURE — 82962 GLUCOSE BLOOD TEST: CPT

## 2021-11-08 PROCEDURE — 36415 COLL VENOUS BLD VENIPUNCTURE: CPT

## 2021-11-08 PROCEDURE — 84484 ASSAY OF TROPONIN QUANT: CPT

## 2021-11-08 PROCEDURE — 80053 COMPREHEN METABOLIC PANEL: CPT

## 2021-11-08 PROCEDURE — 83880 ASSAY OF NATRIURETIC PEPTIDE: CPT

## 2021-11-08 PROCEDURE — 71275 CT ANGIOGRAPHY CHEST: CPT

## 2021-11-08 RX ORDER — SODIUM CHLORIDE 0.9 % (FLUSH) 0.9 %
10 SYRINGE (ML) INJECTION
Status: COMPLETED | OUTPATIENT
Start: 2021-11-08 | End: 2021-11-08

## 2021-11-08 RX ADMIN — SODIUM CHLORIDE, PRESERVATIVE FREE 10 ML: 5 INJECTION INTRAVENOUS at 15:13

## 2021-11-08 RX ADMIN — IOPAMIDOL 75 ML: 755 INJECTION, SOLUTION INTRAVENOUS at 15:13

## 2021-11-08 ASSESSMENT — PAIN SCALES - GENERAL: PAINLEVEL_OUTOF10: 0

## 2021-11-08 NOTE — ED PROVIDER NOTES
CHIEF COMPLAINT  Chief Complaint   Patient presents with    Loss of Consciousness       HPI  Jayesh Eastman is a 77 y.o. male with history of recent radical proctectomy with complication resulting in exploratory laparotomy who presents with an episode of loss of consciousness in the doctor's office this morning. He was sitting in the chair, he went unconscious for a period of 5 seconds and then woke up and was back to baseline. No seizure-like activity. Patient denies any current symptoms. He states \"I feel pretty good\". He denies any chest pain, palpitation, shortness of breath, abdominal pain, vomiting or diarrhea. He states that \"my belly is feeling really good today, I think the healing has been going well and the surgeon is happy with it\". He denies any other recent syncope, he denies any known history of abnormal cardiac disease causing syncope. He denies any abnormal bleeding or bruising recently. He denies any recent DVT, he does a history of DVT in the left upper extremity after IV access several years ago.       REVIEW OF SYSTEMS  Review of Systems   History obtained from chart review and the patient  General ROS: negative for - chills or fever  Ophthalmic ROS: negative for - decreased vision or double vision  ENT ROS: negative for - headaches  Hematological and Lymphatic ROS: negative for - weight loss  Endocrine ROS: negative for - unexpected weight changes  Respiratory ROS: no cough, shortness of breath, or wheezing  Cardiovascular ROS: no chest pain or dyspnea on exertion  Gastrointestinal ROS: no abdominal pain, change in bowel habits, or black or bloody stools  Genito-Urinary ROS: no dysuria, trouble voiding, or hematuria  Musculoskeletal ROS: negative for - joint pain or joint stiffness  Neurological ROS: no TIA or stroke symptoms      PAST MEDICAL HISTORY  Past Medical History:   Diagnosis Date    Acid reflux     Diverticulitis Last Episode In 2013    Hx of blood clots Dx 2013    \"Left Arm\"    Hyperlipidemia     Hypertension     Parathyroid adenoma     Prostate Cancer Dx 2016    2 Prostate Biopsies Done, Last Done In 2017    Teeth missing     Upper And Lower    Wears glasses     Wears partial dentures     Upper       FAMILY HISTORY  Family History   Problem Relation Age of Onset    Stroke Mother     Mental Illness Mother     Obesity Mother        SOCIAL HISTORY  Social History     Socioeconomic History    Marital status:      Spouse name: None    Number of children: None    Years of education: None    Highest education level: None   Occupational History    None   Tobacco Use    Smoking status: Never Smoker    Smokeless tobacco: Never Used   Vaping Use    Vaping Use: Never used   Substance and Sexual Activity    Alcohol use: Yes     Comment: \"A Couple Times A Week\"    Drug use: No    Sexual activity: Yes     Partners: Female   Other Topics Concern    None   Social History Narrative    None     Social Determinants of Health     Financial Resource Strain:     Difficulty of Paying Living Expenses: Not on file   Food Insecurity:     Worried About Running Out of Food in the Last Year: Not on file    Imani of Food in the Last Year: Not on file   Transportation Needs:     Lack of Transportation (Medical): Not on file    Lack of Transportation (Non-Medical):  Not on file   Physical Activity:     Days of Exercise per Week: Not on file    Minutes of Exercise per Session: Not on file   Stress:     Feeling of Stress : Not on file   Social Connections:     Frequency of Communication with Friends and Family: Not on file    Frequency of Social Gatherings with Friends and Family: Not on file    Attends Yazdanism Services: Not on file    Active Member of Clubs or Organizations: Not on file    Attends Club or Organization Meetings: Not on file    Marital Status: Not on file   Intimate Partner Violence:     Fear of Current or Ex-Partner: Not on file   [employer] Abused: Not on file    Physically Abused: Not on file    Sexually Abused: Not on file   Housing Stability:     Unable to Pay for Housing in the Last Year: Not on file    Number of Places Lived in the Last Year: Not on file    Unstable Housing in the Last Year: Not on file       SURGICAL HISTORY  Past Surgical History:   Procedure Laterality Date   [address]  2015    COLONOSCOPY  Last Done In 2000's    Polyps Removed In Past    DENTAL SURGERY      Teeth Extracted In Past   [facility] E Juan C Pham 3 Right 09/04/2018    PROSTATE BIOPSY  2016, 2017    \"Prostate Cancer Dx In 2016\"   [address]  No current facility-administered medications on file prior to encounter. Current Outpatient Medications on File Prior to Encounter   Medication Sig Dispense Refill    docusate sodium (COLACE) 100 MG capsule Take 1 capsule by mouth 2 times daily      senna (SENOKOT) 8.6 MG tablet Take 2 tablets by mouth nightly as needed for Constipation 60 tablet 0    carvedilol (COREG) 3.125 MG tablet Take 3.125 mg by mouth 2 times daily (with meals)      aspirin 81 MG chewable tablet Take 1 tablet by mouth daily 90 tablet 0    atorvastatin (LIPITOR) 80 MG tablet Take 1 tablet by mouth nightly 90 tablet 0    clopidogrel (PLAVIX) 75 MG tablet Take 1 tablet by mouth daily 90 tablet 0    nitroGLYCERIN (NITROSTAT) 0.4 MG SL tablet Place 1 tablet under tongue upon chest pain, wait 5 minutes and may repeat up to 3 doses in 15 minutes. Do not crush or break.  30 tablet 0    Mineral Oil OIL Take by mouth daily Over The Counter      tamsulosin (FLOMAX) 0.4 MG capsule Take 0.8 mg by mouth nightly            ALLERGIES  Allergies   Allergen Reactions    Sulfa Antibiotics      \"Tongue Coats\"       PHYSICAL EXAM  VITAL SIGNS: /72   Pulse 85   Temp 99.1 °F (37.3 °C) (Oral)   Resp 16   Ht 6' 1\" (1.854 m)   Wt 186 lb (84.4 kg)   SpO2 100%   BMI 24.54 kg/m² Constitutional: Well developed, Well nourished, resting in bed, active, pleasant  HENT: Normocephalic, Atraumatic, Bilateral external ears normal, Oropharynx moist, No oral exudates, Nose normal.   Eyes: PERRL, EOMI, Conjunctiva normal, No discharge. Neck: Normal range of motion, Supple, No stridor. Cardiovascular: Normal heart rate, Normal rhythm, No murmurs, No rubs, No gallops. Thorax & Lungs: Normal breath sounds, No respiratory distress, No wheezing, No chest tenderness. Abdomen: Bowel sounds normal, Soft, No tenderness, no guarding, no rebound, No masses, No pulsatile masses. Healing lower abdominal midline incision, no dehiscence, no expression on palpation, no significant tenderness  Skin: Warm, Dry, No erythema, No rash. Extremities: Intact distal pulses, No edema, No tenderness, No cyanosis, No clubbing. Musculoskeletal: Good gross range of motion in all major joints. No major deformities noted. Neurologic: Alert & oriented x 3, Normal gross motor function, Normal gross sensory function, No focal deficits noted. Cranial nerves II through XII intact, no pronator drift, no ataxia   psychiatric: Affect normal    EKG  EKG Interpretation    Interpreted by emergency department physician from November 8 at 1142    Rhythm: normal sinus   Rate: normal  Axis: normal  Ectopy: PACs  Conduction: normal  ST Segments: no acute change  T Waves: no acute change  Q Waves: none    Clinical Impression: Normal sinus rhythm with a rate of 86, no ischemia or ectopy, some motion artifact and PAC present    Canelo Leiva MD      RADIOLOGY/PROCEDURES/LABS  Last Imaging results   CTA PULMONARY W CONTRAST   Final Result   1. No CT evidence for acute pulmonary embolus. 2. Small bilateral pleural effusions with lower lobe atelectasis right   greater than left. 3. Interval development of a subcapsular fluid collection within the dome of   the liver measuring 5.2 x 3.2 x 8.5 cm.   The etiology of this is uncertain and clinical correlation is recommended. XR CHEST (2 VW)   Final Result   Bibasilar pulmonary consolidations and/or pleural effusions worse on the   right. Correlation with clinical evidence of pneumonia or atelectasis with   associated pleural effusion. Underlying malignancy cannot be excluded. Otherwise, radiographically nonacute chest.             Imaging reviewed by myself    Labs Reviewed   CBC WITH AUTO DIFFERENTIAL - Abnormal; Notable for the following components:       Result Value    WBC 12.3 (*)     RBC 3.76 (*)     Hemoglobin 10.8 (*)     Hematocrit 34.2 (*)     MCHC 31.6 (*)     Platelets 539 (*)     Segs Relative 80.1 (*)     Lymphocytes % 11.2 (*)     Monocytes % 7.7 (*)     All other components within normal limits   COMPREHENSIVE METABOLIC PANEL - Abnormal; Notable for the following components:    Chloride 98 (*)     Glucose 113 (*)     Albumin 3.1 (*)     Alkaline Phosphatase 202 (*)     All other components within normal limits   POCT GLUCOSE - Normal   TROPONIN   BRAIN NATRIURETIC PEPTIDE   POCT GLUCOSE         Medications   iopamidol (ISOVUE-370) 76 % injection 75 mL (75 mLs IntraVENous Given 11/8/21 1513)   sodium chloride flush 0.9 % injection 10 mL (10 mLs IntraVENous Given 11/8/21 1513)       COURSE & MEDICAL DECISION MAKING  Pertinent Labs & Imaging studies reviewed. (See chart for details)    78-year-old male had an episode where he syncopized for several seconds when sitting in the chair at his doctor's office. It is uncertain if this was a syncope, if he transiently nodded off and fell asleep but it did not appear to be a seizure-like episode and he was back to baseline without any postictal period. Here he is denying any signs or symptoms, however based on his age and his recent surgical intervention I was concerned for cardiac dysrhythmia, electrolyte abnormality, anemia or other acute abnormalities to metabolic work-up was obtained.   His EKG and troponin are not room.  Electronically signed by: Guero Chau MD, 11/8/2021  MD Guero Floyd MD  11/08/21 1950

## 2021-11-08 NOTE — ED NOTES
Patient given discharge instructions at this time. Verbalizes understanding and denies any further needs, questions, concerns or complaints. Patient ambulatory to front of ER in standing, up position in stable condition.       Chandrakant Serna RN  11/08/21 1804

## 2021-11-08 NOTE — ED NOTES
Bed: H-02  Expected date:   Expected time:   Means of arrival:   Comments:  Syncopal episode     Rachel Salguero RN  11/08/21 [id number]

## 2021-11-08 NOTE — ED TRIAGE NOTES
Pt arrives via ems after a syncope episode at his doctors office today, pt states he was only out for a couple seconds, witnessed by family and doctor. Pt was seeing his physician for check up after a prostectomy 3 weeks ago and bowel resection. Pt currently alert and oriented and has no complaints.

## 2021-11-09 ENCOUNTER — HOSPITAL ENCOUNTER (INPATIENT)
Age: 76
LOS: 1 days | Discharge: HOME | DRG: 312 | End: 2021-11-10
Attending: EMERGENCY MEDICINE | Admitting: INTERNAL MEDICINE
Payer: MEDICARE

## 2021-11-09 ENCOUNTER — APPOINTMENT (OUTPATIENT)
Dept: ULTRASOUND IMAGING | Age: 76
DRG: 312 | End: 2021-11-09
Payer: MEDICARE

## 2021-11-09 DIAGNOSIS — R55 RECURRENT SYNCOPE: Primary | ICD-10-CM

## 2021-11-09 PROBLEM — I95.1 SYNCOPE DUE TO ORTHOSTATIC HYPOTENSION: Status: ACTIVE | Noted: 2021-11-09

## 2021-11-09 LAB
ALBUMIN SERPL-MCNC: 2.8 GM/DL (ref 3.4–5)
ALP BLD-CCNC: 182 IU/L (ref 40–129)
ALT SERPL-CCNC: 37 U/L (ref 10–40)
ANION GAP SERPL CALCULATED.3IONS-SCNC: 12 MMOL/L (ref 4–16)
APTT: 26.5 SECONDS (ref 25.1–37.1)
AST SERPL-CCNC: 30 IU/L (ref 15–37)
BASOPHILS ABSOLUTE: 0 K/CU MM
BASOPHILS RELATIVE PERCENT: 0.3 % (ref 0–1)
BILIRUB SERPL-MCNC: 0.7 MG/DL (ref 0–1)
BUN BLDV-MCNC: 17 MG/DL (ref 6–23)
CALCIUM SERPL-MCNC: 8.9 MG/DL (ref 8.3–10.6)
CHLORIDE BLD-SCNC: 100 MMOL/L (ref 99–110)
CO2: 22 MMOL/L (ref 21–32)
CREAT SERPL-MCNC: 1 MG/DL (ref 0.9–1.3)
DIFFERENTIAL TYPE: ABNORMAL
EOSINOPHILS ABSOLUTE: 0 K/CU MM
EOSINOPHILS RELATIVE PERCENT: 0.2 % (ref 0–3)
GFR AFRICAN AMERICAN: >60 ML/MIN/1.73M2
GFR NON-AFRICAN AMERICAN: >60 ML/MIN/1.73M2
GLUCOSE BLD-MCNC: 110 MG/DL (ref 70–99)
HCT VFR BLD CALC: 34.9 % (ref 42–52)
HEMOGLOBIN: 11.1 GM/DL (ref 13.5–18)
IMMATURE NEUTROPHIL %: 0.3 % (ref 0–0.43)
INR BLD: 1.28 INDEX
LYMPHOCYTES ABSOLUTE: 1.4 K/CU MM
LYMPHOCYTES RELATIVE PERCENT: 10.8 % (ref 24–44)
MAGNESIUM: 1.7 MG/DL (ref 1.8–2.4)
MCH RBC QN AUTO: 28.5 PG (ref 27–31)
MCHC RBC AUTO-ENTMCNC: 31.8 % (ref 32–36)
MCV RBC AUTO: 89.5 FL (ref 78–100)
MONOCYTES ABSOLUTE: 1 K/CU MM
MONOCYTES RELATIVE PERCENT: 8.1 % (ref 0–4)
NUCLEATED RBC %: 0 %
PDW BLD-RTO: 14.3 % (ref 11.7–14.9)
PLATELET # BLD: 507 K/CU MM (ref 140–440)
PMV BLD AUTO: 9.3 FL (ref 7.5–11.1)
POTASSIUM SERPL-SCNC: 3.8 MMOL/L (ref 3.5–5.1)
PROTHROMBIN TIME: 16.6 SECONDS (ref 11.7–14.5)
RBC # BLD: 3.9 M/CU MM (ref 4.6–6.2)
SEGMENTED NEUTROPHILS ABSOLUTE COUNT: 10.1 K/CU MM
SEGMENTED NEUTROPHILS RELATIVE PERCENT: 80.3 % (ref 36–66)
SODIUM BLD-SCNC: 134 MMOL/L (ref 135–145)
TOTAL IMMATURE NEUTOROPHIL: 0.04 K/CU MM
TOTAL NUCLEATED RBC: 0 K/CU MM
TOTAL PROTEIN: 7.3 GM/DL (ref 6.4–8.2)
TROPONIN T: <0.01 NG/ML
TROPONIN T: <0.01 NG/ML
WBC # BLD: 12.6 K/CU MM (ref 4–10.5)

## 2021-11-09 PROCEDURE — 2140000000 HC CCU INTERMEDIATE R&B

## 2021-11-09 PROCEDURE — 36415 COLL VENOUS BLD VENIPUNCTURE: CPT

## 2021-11-09 PROCEDURE — 85025 COMPLETE CBC W/AUTO DIFF WBC: CPT

## 2021-11-09 PROCEDURE — 96372 THER/PROPH/DIAG INJ SC/IM: CPT

## 2021-11-09 PROCEDURE — 83735 ASSAY OF MAGNESIUM: CPT

## 2021-11-09 PROCEDURE — 2580000003 HC RX 258: Performed by: EMERGENCY MEDICINE

## 2021-11-09 PROCEDURE — 85610 PROTHROMBIN TIME: CPT

## 2021-11-09 PROCEDURE — 84484 ASSAY OF TROPONIN QUANT: CPT

## 2021-11-09 PROCEDURE — 6370000000 HC RX 637 (ALT 250 FOR IP): Performed by: NURSE PRACTITIONER

## 2021-11-09 PROCEDURE — 93005 ELECTROCARDIOGRAM TRACING: CPT | Performed by: EMERGENCY MEDICINE

## 2021-11-09 PROCEDURE — 93880 EXTRACRANIAL BILAT STUDY: CPT

## 2021-11-09 PROCEDURE — 99285 EMERGENCY DEPT VISIT HI MDM: CPT

## 2021-11-09 PROCEDURE — 85730 THROMBOPLASTIN TIME PARTIAL: CPT

## 2021-11-09 PROCEDURE — 80053 COMPREHEN METABOLIC PANEL: CPT

## 2021-11-09 PROCEDURE — 6360000002 HC RX W HCPCS: Performed by: NURSE PRACTITIONER

## 2021-11-09 RX ORDER — PAROXETINE HYDROCHLORIDE 20 MG/1
20 TABLET, FILM COATED ORAL DAILY
COMMUNITY
Start: 2021-11-08 | End: 2022-08-17

## 2021-11-09 RX ORDER — SODIUM CHLORIDE 9 MG/ML
INJECTION, SOLUTION INTRAVENOUS CONTINUOUS
Status: DISCONTINUED | OUTPATIENT
Start: 2021-11-09 | End: 2021-11-10

## 2021-11-09 RX ORDER — DOCUSATE SODIUM 100 MG/1
100 CAPSULE, LIQUID FILLED ORAL 2 TIMES DAILY
Status: DISCONTINUED | OUTPATIENT
Start: 2021-11-09 | End: 2021-11-11 | Stop reason: HOSPADM

## 2021-11-09 RX ORDER — MAGNESIUM SULFATE IN WATER 40 MG/ML
2000 INJECTION, SOLUTION INTRAVENOUS PRN
Status: DISCONTINUED | OUTPATIENT
Start: 2021-11-09 | End: 2021-11-11 | Stop reason: HOSPADM

## 2021-11-09 RX ORDER — ONDANSETRON 4 MG/1
4 TABLET, ORALLY DISINTEGRATING ORAL EVERY 8 HOURS PRN
Status: DISCONTINUED | OUTPATIENT
Start: 2021-11-09 | End: 2021-11-11 | Stop reason: HOSPADM

## 2021-11-09 RX ORDER — MIDODRINE HYDROCHLORIDE 5 MG/1
2.5-5 TABLET ORAL 3 TIMES DAILY
Status: ON HOLD | COMMUNITY
Start: 2021-11-08 | End: 2021-11-10 | Stop reason: HOSPADM

## 2021-11-09 RX ORDER — ONDANSETRON 2 MG/ML
4 INJECTION INTRAMUSCULAR; INTRAVENOUS EVERY 6 HOURS PRN
Status: DISCONTINUED | OUTPATIENT
Start: 2021-11-09 | End: 2021-11-11 | Stop reason: HOSPADM

## 2021-11-09 RX ORDER — ACETAMINOPHEN 325 MG/1
650 TABLET ORAL EVERY 6 HOURS PRN
Status: DISCONTINUED | OUTPATIENT
Start: 2021-11-09 | End: 2021-11-11 | Stop reason: HOSPADM

## 2021-11-09 RX ORDER — MIRTAZAPINE 15 MG/1
15 TABLET, FILM COATED ORAL NIGHTLY
COMMUNITY
Start: 2021-11-08 | End: 2022-08-17

## 2021-11-09 RX ORDER — POTASSIUM CHLORIDE 7.45 MG/ML
10 INJECTION INTRAVENOUS PRN
Status: DISCONTINUED | OUTPATIENT
Start: 2021-11-09 | End: 2021-11-11 | Stop reason: HOSPADM

## 2021-11-09 RX ORDER — MIDODRINE HYDROCHLORIDE 5 MG/1
5 TABLET ORAL
Status: DISCONTINUED | OUTPATIENT
Start: 2021-11-09 | End: 2021-11-11 | Stop reason: HOSPADM

## 2021-11-09 RX ORDER — POLYETHYLENE GLYCOL 3350 17 G/17G
17 POWDER, FOR SOLUTION ORAL DAILY
Status: DISCONTINUED | OUTPATIENT
Start: 2021-11-09 | End: 2021-11-11 | Stop reason: HOSPADM

## 2021-11-09 RX ORDER — ROSUVASTATIN CALCIUM 20 MG/1
20 TABLET, COATED ORAL NIGHTLY
Status: ON HOLD | COMMUNITY
Start: 2021-07-13 | End: 2021-11-10 | Stop reason: HOSPADM

## 2021-11-09 RX ORDER — SODIUM CHLORIDE 0.9 % (FLUSH) 0.9 %
5-40 SYRINGE (ML) INJECTION PRN
Status: DISCONTINUED | OUTPATIENT
Start: 2021-11-09 | End: 2021-11-11 | Stop reason: HOSPADM

## 2021-11-09 RX ORDER — POTASSIUM CHLORIDE 20 MEQ/1
40 TABLET, EXTENDED RELEASE ORAL PRN
Status: DISCONTINUED | OUTPATIENT
Start: 2021-11-09 | End: 2021-11-11 | Stop reason: HOSPADM

## 2021-11-09 RX ORDER — TAMSULOSIN HYDROCHLORIDE 0.4 MG/1
0.8 CAPSULE ORAL NIGHTLY
Status: DISCONTINUED | OUTPATIENT
Start: 2021-11-09 | End: 2021-11-11 | Stop reason: HOSPADM

## 2021-11-09 RX ORDER — 0.9 % SODIUM CHLORIDE 0.9 %
1000 INTRAVENOUS SOLUTION INTRAVENOUS ONCE
Status: COMPLETED | OUTPATIENT
Start: 2021-11-09 | End: 2021-11-09

## 2021-11-09 RX ORDER — POLYETHYLENE GLYCOL 3350 17 G/17G
17 POWDER, FOR SOLUTION ORAL DAILY
COMMUNITY
Start: 2021-10-29 | End: 2021-11-13

## 2021-11-09 RX ORDER — SODIUM CHLORIDE 0.9 % (FLUSH) 0.9 %
5-40 SYRINGE (ML) INJECTION EVERY 12 HOURS SCHEDULED
Status: DISCONTINUED | OUTPATIENT
Start: 2021-11-09 | End: 2021-11-11 | Stop reason: HOSPADM

## 2021-11-09 RX ORDER — ASPIRIN 81 MG/1
81 TABLET, CHEWABLE ORAL DAILY
Status: DISCONTINUED | OUTPATIENT
Start: 2021-11-09 | End: 2021-11-11 | Stop reason: HOSPADM

## 2021-11-09 RX ORDER — SODIUM CHLORIDE 9 MG/ML
25 INJECTION, SOLUTION INTRAVENOUS PRN
Status: DISCONTINUED | OUTPATIENT
Start: 2021-11-09 | End: 2021-11-11 | Stop reason: HOSPADM

## 2021-11-09 RX ORDER — OXYBUTYNIN CHLORIDE 5 MG/1
5 TABLET ORAL 3 TIMES DAILY PRN
COMMUNITY
Start: 2021-10-29 | End: 2022-08-17

## 2021-11-09 RX ORDER — ATORVASTATIN CALCIUM 40 MG/1
80 TABLET, FILM COATED ORAL NIGHTLY
Status: DISCONTINUED | OUTPATIENT
Start: 2021-11-09 | End: 2021-11-10

## 2021-11-09 RX ORDER — POLYETHYLENE GLYCOL 3350 17 G/17G
17 POWDER, FOR SOLUTION ORAL DAILY
Status: DISCONTINUED | OUTPATIENT
Start: 2021-11-09 | End: 2021-11-09

## 2021-11-09 RX ORDER — CLOPIDOGREL BISULFATE 75 MG/1
75 TABLET ORAL DAILY
Status: DISCONTINUED | OUTPATIENT
Start: 2021-11-09 | End: 2021-11-11 | Stop reason: HOSPADM

## 2021-11-09 RX ORDER — ACETAMINOPHEN 650 MG/1
650 SUPPOSITORY RECTAL EVERY 6 HOURS PRN
Status: DISCONTINUED | OUTPATIENT
Start: 2021-11-09 | End: 2021-11-11 | Stop reason: HOSPADM

## 2021-11-09 RX ORDER — CYCLOBENZAPRINE HCL 10 MG
5 TABLET ORAL 3 TIMES DAILY PRN
COMMUNITY
Start: 2021-10-29 | End: 2022-08-17

## 2021-11-09 RX ORDER — SENNA PLUS 8.6 MG/1
2 TABLET ORAL NIGHTLY PRN
Status: DISCONTINUED | OUTPATIENT
Start: 2021-11-09 | End: 2021-11-11 | Stop reason: HOSPADM

## 2021-11-09 RX ORDER — OXYCODONE HYDROCHLORIDE 5 MG/1
5 TABLET ORAL EVERY 6 HOURS PRN
COMMUNITY
Start: 2021-10-29 | End: 2022-08-17

## 2021-11-09 RX ADMIN — TAMSULOSIN HYDROCHLORIDE 0.8 MG: 0.4 CAPSULE ORAL at 21:12

## 2021-11-09 RX ADMIN — ATORVASTATIN CALCIUM 80 MG: 40 TABLET, FILM COATED ORAL at 21:12

## 2021-11-09 RX ADMIN — ENOXAPARIN SODIUM 40 MG: 100 INJECTION SUBCUTANEOUS at 21:11

## 2021-11-09 RX ADMIN — DOCUSATE SODIUM 100 MG: 100 CAPSULE ORAL at 21:11

## 2021-11-09 RX ADMIN — SODIUM CHLORIDE 1000 ML: 9 INJECTION, SOLUTION INTRAVENOUS at 17:42

## 2021-11-09 RX ADMIN — ASPIRIN 81 MG: 81 TABLET, CHEWABLE ORAL at 21:12

## 2021-11-09 RX ADMIN — CLOPIDOGREL BISULFATE 75 MG: 75 TABLET ORAL at 21:11

## 2021-11-09 RX ADMIN — MIDODRINE HYDROCHLORIDE 5 MG: 5 TABLET ORAL at 21:11

## 2021-11-09 ASSESSMENT — ENCOUNTER SYMPTOMS
NAUSEA: 0
ABDOMINAL PAIN: 0
SORE THROAT: 0
VOMITING: 0
SHORTNESS OF BREATH: 0
EYES NEGATIVE: 1
CONSTIPATION: 0
COUGH: 0

## 2021-11-09 NOTE — CARE COORDINATION
CM review of pt chart for readmission risk, last admission 10/11-29/21 [facility], discharged to ARU 10/29-11/6/21. Pt seen at Dr Ana Paula Whalen office yesterday, syncope while there, seen in ER was discharged home. Pt returned to ER from home with wife/Neisha [phone],  today via EMS for AMS, Dr Ranjit Ackerman provider. Spoke with Dr Alvaro Brothers states he continues to have syncopal episodes, witnessed at Dr Andres Velez yesterday presented like seizure activity. Dr Alvaro Brothers confirms no alternative to admission at this time, states pt wants to go home. Dr Alvaro Brothers not recommending d/c going to talk with pt.     Pt agreeable to admission AANLIARN/BRIGETTE

## 2021-11-09 NOTE — CONSULTS
Dictated-[id number]  Near syncope  Positive ortho BP  Start on proamatine  Hx of CAD and PCI    Electronically signed by Marizol Blake MD on 11/9/21 at 6:50 PM EST

## 2021-11-09 NOTE — ED PROVIDER NOTES
Triage Chief Complaint:   Altered Mental Status (seen yesterday for same per ems, pt having intermittent nonresponsive(slouching in chair, protecting airway, will track people with eyes, but not responsive to commands) episodes witnessed by ems and PT at pt home lasting approx 15 mins today, alert and oriented immediately upon becoming alert, )    [place]:  Shannan Benton is a 77 y.o. male that presents with another episode of unresponsiveness. Patient has had a few episodes recently where he is lost consciousness including in his primary care physician office yesterday prompting ED visit yesterday. Patient has been worked up broadly with overall very reassuring work-ups including negative CTA imaging of his chest just yesterday. Patient did feel improved throughout ED visit yesterday and requested home-going. Today patient had another episode where he slouched in the chair but was not responsive to commands. Patient reports that he does not recall being unresponsive. Patient presently tells me that he has \"problems getting in the fluids\" and gets lightheaded and passes out with standing up. Patient details long-term issues since his prostatectomy secondary to prostate cancer where he had a bowel injury and subsequent surgery and prolonged hospitalization and rehabilitation.     ROS:  General:  No fevers, no chills, no weakness  Eyes:  No recent vison changes, no discharge  ENT:  No sore throat, no nasal congestion, no hearing changes  Cardiovascular:  No chest pain, no palpitations, + passing out/lightheaded with standing  Respiratory:  No shortness of breath, no cough, no wheezing  Gastrointestinal:  No pain, no nausea, no vomiting, no diarrhea  Musculoskeletal:  No muscle pain, no joint pain  Skin:  No rash, no pruritis, no easy bruising  Neurologic:  No speech problems, no headache, no extremity numbness, no extremity tingling, no extremity weakness  Psychiatric:  No anxiety  Genitourinary:  No dysuria, no hematuria  Endocrine:  No unexpected weight gain, no unexpected weight loss  Extremities:  no edema, no pain    Past Medical History:   Diagnosis Date    Acid reflux     Diverticulitis Last Episode In 2013    Hx of blood clots Dx 2013    \"Left Arm\"    Hyperlipidemia     Hypertension     Parathyroid adenoma     Prostate Cancer Dx 2016    2 Prostate Biopsies Done, Last Done In 2017    Teeth missing     Upper And Lower    Wears glasses     Wears partial dentures     Upper     Past Surgical History:   Procedure Laterality Date    CARDIAC CATHETERIZATION  2015    COLONOSCOPY  Last Done In 2000's    Polyps Removed In Past    DENTAL SURGERY      Teeth Extracted In Past    [facility]    PARATHYROIDECTOMY Right 09/04/2018    PROSTATE BIOPSY  2016, 2017    \"Prostate Cancer Dx In 2016\"    VASECTOMY  1986     Family History   Problem Relation Age of Onset    Stroke Mother     Mental Illness Mother     Obesity Mother      Social History     Socioeconomic History    Marital status:      Spouse name: Not on file    Number of children: Not on file    Years of education: Not on file    Highest education level: Not on file   Occupational History    Not on file   Tobacco Use    Smoking status: Never Smoker    Smokeless tobacco: Never Used   Vaping Use    Vaping Use: Never used   Substance and Sexual Activity    Alcohol use: Yes     Comment: \"A Couple Times A Week\"    Drug use: No    Sexual activity: Yes     Partners: Female   Other Topics Concern    Not on file   Social History Narrative    Not on file     Social Determinants of Health     Financial Resource Strain:     Difficulty of Paying Living Expenses: Not on file   Food Insecurity:     Worried About Running Out of Food in the Last Year: Not on file    Imani of Food in the Last Year: Not on file   Transportation Needs:     Lack of Transportation (Medical): Not on file    Lack of Transportation (Non-Medical):  Not on file Physical Activity:     Days of Exercise per Week: Not on file    Minutes of Exercise per Session: Not on file   Stress:     Feeling of Stress : Not on file   Social Connections:     Frequency of Communication with Friends and Family: Not on file    Frequency of Social Gatherings with Friends and Family: Not on file    Attends Holiness Services: Not on file    Active Member of [address] [place] [address] or Organizations: Not on file    Attends Club or Organization Meetings: Not on file    Marital Status: Not on file   Intimate Partner Violence:     Fear of Current or Ex-Partner: Not on file    Emotionally Abused: Not on file    Physically Abused: Not on file    Sexually Abused: Not on file   Housing Stability:     Unable to Pay for Housing in the Last Year: Not on file    Number of [place] in the Last Year: Not on file    Unstable Housing in the Last Year: Not on file     Current Facility-Administered Medications   Medication Dose Route Frequency Provider Last Rate Last Admin    midodrine (PROAMATINE) tablet 5 mg  5 mg Oral TID  DAYNA Kern - CNP   5 mg at 11/09/21 2111    tamsulosin (FLOMAX) capsule 0.8 mg  0.8 mg Oral Nightly Ramu Ro, APRN - CNP   0.8 mg at 11/09/21 2112    aspirin chewable tablet 81 mg  81 mg Oral Daily DAYNA Kern - CNP   81 mg at 11/09/21 2112    atorvastatin (LIPITOR) tablet 80 mg  80 mg Oral Nightly Ramu Ro, APRN - CNP   80 mg at 11/09/21 2112    clopidogrel (PLAVIX) tablet 75 mg  75 mg Oral Daily DAYNA Kern - CNP   75 mg at 11/09/21 2111    docusate sodium (COLACE) capsule 100 mg  100 mg Oral BID Ramu Ro, APRN - CNP   100 mg at 11/09/21 2111    senna (SENOKOT) tablet 17.2 mg  2 tablet Oral Nightly PRN DAYNA Kern CNP        polyethylene glycol (GLYCOLAX) packet 17 g  17 g Oral Daily DAYNA Kern CNP        sodium chloride flush 0.9 % injection 5-40 mL  5-40 mL IntraVENous 2 times per day DAYNA Kern - CNP        sodium chloride flush 0.9 % injection 5-40 mL  5-40 mL IntraVENous PRN Hillary Caba APRN - CNP        0.9 % sodium chloride infusion  25 mL IntraVENous PRN Hillary I DAYNA Caba - CNP        potassium chloride (KLOR-CON M) extended release tablet 40 mEq  40 mEq Oral PRN Hillary I DENNIS CabaN - CNP        Or    potassium bicarb-citric acid (EFFER-K) effervescent tablet 40 mEq  40 mEq Oral PRN Hillary I DAYNA Caba - CNP        Or    potassium chloride 10 mEq/100 mL IVPB (Peripheral Line)  10 mEq IntraVENous PRN DAYNA Kern - CNP        magnesium sulfate 2000 mg in 50 mL IVPB premix  2,000 mg IntraVENous PRN Hillary I DAYNA Caba - CNP        enoxaparin (LOVENOX) injection 40 mg  40 mg SubCUTAneous Daily DAYNA Kern - CNP   40 mg at 11/09/21 2111    ondansetron (ZOFRAN-ODT) disintegrating tablet 4 mg  4 mg Oral Q8H PRN DAYNA Kern CNP        Or    ondansetron (ZOFRAN) injection 4 mg  4 mg IntraVENous Q6H PRN Hillary I DAYNA Caba - CNP        acetaminophen (TYLENOL) tablet 650 mg  650 mg Oral Q6H PRN Hillary I DAYNA Caba - CNP        Or    acetaminophen (TYLENOL) suppository 650 mg  650 mg Rectal Q6H PRN Hillary I DAYNA Caba - CNP        0.9 % sodium chloride infusion   IntraVENous Continuous DAYNA Kern CNP         Current Outpatient Medications   Medication Sig Dispense Refill    cyclobenzaprine (FLEXERIL) 10 MG tablet Take 5 mg by mouth 3 times daily as needed      midodrine (PROAMATINE) 5 MG tablet Take 2.5-5 mg by mouth 3 times daily      mirtazapine (REMERON) 15 MG tablet Take 15 mg by mouth nightly      oxybutynin (DITROPAN) 5 MG tablet Take 5 mg by mouth 3 times daily as needed      oxyCODONE (ROXICODONE) 5 MG immediate release tablet Take 5 mg by mouth every 6 hours as needed.       PARoxetine (PAXIL) 20 MG tablet Take 20 mg by mouth daily      rosuvastatin (CRESTOR) 20 MG tablet Take 20 mg by mouth nightly      polyethylene glycol (GLYCOLAX) 17 GM/SCOOP powder Take 17 g by mouth daily      docusate sodium (COLACE) 100 MG capsule Take 1 capsule by mouth 2 times daily      senna (SENOKOT) 8.6 MG tablet Take 2 tablets by mouth nightly as needed for Constipation 60 tablet 0    carvedilol (COREG) 3.125 MG tablet Take 3.125 mg by mouth 2 times daily (with meals)      aspirin 81 MG chewable tablet Take 1 tablet by mouth daily 90 tablet 0    atorvastatin (LIPITOR) 80 MG tablet Take 1 tablet by mouth nightly 90 tablet 0    clopidogrel (PLAVIX) 75 MG tablet Take 1 tablet by mouth daily 90 tablet 0    nitroGLYCERIN (NITROSTAT) 0.4 MG SL tablet Place 1 tablet under tongue upon chest pain, wait 5 minutes and may repeat up to 3 doses in 15 minutes. Do not crush or break. 30 tablet 0    Mineral Oil OIL Take by mouth daily Over The Counter      tamsulosin (FLOMAX) 0.4 MG capsule Take 0.8 mg by mouth nightly        Allergies   Allergen Reactions    Sulfa Antibiotics      \"Tongue Coats\"       Nursing Notes Reviewed    Physical Exam:  ED Triage Vitals [11/09/21 1416]   Enc Vitals Group      BP 98/69      Pulse 78      Resp 16      Temp 98.2 °F (36.8 °C)      Temp Source Oral      SpO2 96 %      Weight 186 lb (84.4 kg)      Height 6' 1\" (1.854 m)      Head Circumference       Peak Flow       Pain Score       Pain Loc       Pain Edu? Excl. in [address]? My pulse ox interpretation is - normal    General appearance:  No acute distress. Appears nontoxic laying comfortably in bed. Skin:  Warm. Dry. No diaphoresis. Eye:  Extraocular movements intact. Pupils are midrange and equally reactive to light and accommodation. Ears, nose, mouth and throat: Slightly tacky mucous membranes. Neck:  Trachea midline. No meningismus or rigidity. Speaking clearly in full sentences. Extremity:  No swelling.   Normal ROM     Heart:  Regular rate and rhythm, normal S1 & S2, no extra heart sounds. Perfusion:  Intact   Respiratory:  Lungs clear to auscultation bilaterally. Respirations nonlabored. Abdominal:  Normal bowel sounds. Soft. Nontender. Non distended. Back:  No CVA tenderness to palpation     Neurological:  Alert and oriented times 3. No focal neuro deficits.              Psychiatric:  Appropriate    I have reviewed and interpreted all of the currently available lab results from this visit (if applicable):  Results for orders placed or performed during the hospital encounter of 11/09/21   CBC auto diff   Result Value Ref Range    WBC 12.6 (H) 4.0 - 10.5 K/CU MM    RBC 3.90 (L) 4.6 - 6.2 M/CU MM    Hemoglobin 11.1 (L) 13.5 - 18.0 GM/DL    Hematocrit 34.9 (L) 42 - 52 %    MCV 89.5 78 - 100 FL    MCH 28.5 27 - 31 PG    MCHC 31.8 (L) 32.0 - 36.0 %    RDW 14.3 11.7 - 14.9 %    Platelets 710 (H) 286 - 440 K/CU MM    MPV 9.3 7.5 - 11.1 FL    Differential Type AUTOMATED DIFFERENTIAL     Segs Relative 80.3 (H) 36 - 66 %    Lymphocytes % 10.8 (L) 24 - 44 %    Monocytes % 8.1 (H) 0 - 4 %    Eosinophils % 0.2 0 - 3 %    Basophils % 0.3 0 - 1 %    Segs Absolute 10.1 K/CU MM    Lymphocytes Absolute 1.4 K/CU MM    Monocytes Absolute 1.0 K/CU MM    Eosinophils Absolute 0.0 K/CU MM    Basophils Absolute 0.0 K/CU MM    Nucleated RBC % 0.0 %    Total Nucleated RBC 0.0 K/CU MM    Total Immature Neutrophil 0.04 K/CU MM    Immature Neutrophil % 0.3 0 - 0.43 %   CMP   Result Value Ref Range    Sodium 134 (L) 135 - 145 MMOL/L    Potassium 3.8 3.5 - 5.1 MMOL/L    Chloride 100 99 - 110 mMol/L    CO2 22 21 - 32 MMOL/L    BUN 17 6 - 23 MG/DL    CREATININE 1.0 0.9 - 1.3 MG/DL    Glucose 110 (H) 70 - 99 MG/DL    Calcium 8.9 8.3 - 10.6 MG/DL    Albumin 2.8 (L) 3.4 - 5.0 GM/DL    Total Protein 7.3 6.4 - 8.2 GM/DL    Total Bilirubin 0.7 0.0 - 1.0 MG/DL    ALT 37 10 - 40 U/L    AST 30 15 - 37 IU/L    Alkaline Phosphatase 182 (H) 40 - 129 IU/L    GFR Non- American >60 >60 mL/min/1.73m2    GFR African American >60 >60 mL/min/1.73m2    Anion Gap 12 4 - 16   Troponin   Result Value Ref Range    Troponin T <0.010 <0.01 NG/ML   Magnesium   Result Value Ref Range    Magnesium 1.7 (L) 1.8 - 2.4 mg/dl   Protime/INR & PTT   Result Value Ref Range    Protime 16.6 (H) 11.7 - 14.5 SECONDS    INR 1.28 INDEX    aPTT 26.5 25.1 - 37.1 SECONDS   Troponin   Result Value Ref Range    Troponin T <0.010 <0.01 NG/ML      Radiographs (if obtained):  [] The following radiograph was interpreted by myself in the absence of a radiologist:   [x] Radiologist's Report Reviewed:  VL DUP CAROTID BILATERAL   Final Result   The right internal carotid artery demonstrates 0-50% stenosis. The left internal carotid artery demonstrates 0-50% stenosis. Bilateral vertebral arteries are patent with flow in the normal direction. EKG (if obtained): (All EKG's are interpreted by myself in the absence of a cardiologist)        Chart review shows recent radiographs:  XR CHEST (2 VW)    Result Date: 11/8/2021  EXAMINATION: TWO XRAY VIEWS OF THE CHEST 11/8/2021 12:48 pm COMPARISON: 05/05/2021 HISTORY: ORDERING SYSTEM PROVIDED HISTORY: Chest pain TECHNOLOGIST PROVIDED HISTORY: Reason for exam:->Chest pain Reason for Exam: Chest pain FINDINGS: Increased density in the posterior basilar regions bilaterally worse on the right consistent with bibasilar pulmonary consolidations and/or pleural effusions worse on the right. Otherwise, lung fields are clear. No pneumothorax, pulmonary edema, cardiomegaly or mediastinal widening. Bibasilar pulmonary consolidations and/or pleural effusions worse on the right. Correlation with clinical evidence of pneumonia or atelectasis with associated pleural effusion. Underlying malignancy cannot be excluded.  Otherwise, radiographically nonacute chest.     CTA PULMONARY W CONTRAST    Result Date: 11/8/2021  EXAMINATION: CTA OF THE CHEST 11/8/2021 2:55 pm TECHNIQUE: CTA of the chest was performed after the administration of intravenous contrast.  Multiplanar reformatted images are provided for review. MIP images are provided for review. Dose modulation, iterative reconstruction, and/or weight based adjustment of the mA/kV was utilized to reduce the radiation dose to as low as reasonably achievable. COMPARISON: 05/05/2021. HISTORY: ORDERING SYSTEM PROVIDED HISTORY: syncope, new pleural effusions TECHNOLOGIST PROVIDED HISTORY: Reason for exam:->syncope, new pleural effusions Decision Support Exception - unselect if not a suspected or confirmed emergency medical condition->Emergency Medical Condition (MA) Reason for Exam: syncope, new pleural effusions Acuity: Acute Type of Exam: Initial Additional signs and symptoms: NONE Relevant Medical/Surgical History: 75ML SIOVUE 370 /GFR>60 FINDINGS: Pulmonary Arteries: Pulmonary arteries are adequately opacified for evaluation. No evidence of intraluminal filling defect to suggest pulmonary embolism. Main pulmonary artery is normal in caliber. Mediastinum: No evidence of mediastinal lymphadenopathy. The heart and pericardium demonstrate no acute abnormality. There is no acute abnormality of the thoracic aorta. Lungs/pleura: The lung parenchyma demonstrates small bilateral pleural effusions right greater than left. There is lower lobe atelectasis right greater than left. No pneumothoraces are seen. Upper Abdomen: The visualized portion of the upper abdomen demonstrates interval development of a subcapsular fluid collection involving the dome of the liver measuring 5.2 x 3.2 x 8.5 cm. Soft Tissues/Bones: No acute bone or soft tissue abnormality. 1. No CT evidence for acute pulmonary embolus. 2. Small bilateral pleural effusions with lower lobe atelectasis right greater than left. 3. Interval development of a subcapsular fluid collection within the dome of the liver measuring 5.2 x 3.2 x 8.5 cm.   The etiology of this is uncertain and clinical correlation is recommended. MDM:  Pt presents as above. Emergent conditions considered. Presentation prompted initial labs and EKG. IVs established IV fluids are initiated. Labs are reassuring including negative troponin. CBC and CMP without clinically significant derangement. Patient with extensive workup just yesterday for the same, which was reviewed. Given recurrent syncope admission is warranted and this is discussed with patient who is agreeable. Patient's cardiologist, Dr. Caroline Peacock, is consulted and did see the patient in the ED. Patient is discussed with hospitalist team and patient is admitted for further evaluation and treatment with suspicion for an orthostatic syncope process. Questions sought and answered with the patient. They voice understanding and agree with plan. Care of this patient occurred during the COVID-19 pandemic. Clinical Impression:  1. Recurrent syncope      Disposition referral (if applicable):  No follow-up provider specified. Disposition medications (if applicable):  New Prescriptions    No medications on file       Comment: Please note this report has been produced using speech recognition software and may contain errors related to that system including errors in grammar, punctuation, and spelling, as well as words and phrases that may be inappropriate. If there are any questions or concerns please feel free to contact the dictating provider for clarification.        Mark Tee MD  11/10/21 [id number]

## 2021-11-10 VITALS
RESPIRATION RATE: 18 BRPM | HEIGHT: 73 IN | SYSTOLIC BLOOD PRESSURE: 150 MMHG | TEMPERATURE: 98.5 F | BODY MASS INDEX: 24.65 KG/M2 | OXYGEN SATURATION: 100 % | HEART RATE: 97 BPM | DIASTOLIC BLOOD PRESSURE: 94 MMHG | WEIGHT: 186 LBS

## 2021-11-10 PROBLEM — I95.1 ORTHOSTATIC HYPOTENSION: Status: ACTIVE | Noted: 2021-11-10

## 2021-11-10 LAB
ANION GAP SERPL CALCULATED.3IONS-SCNC: 10 MMOL/L (ref 4–16)
BUN BLDV-MCNC: 13 MG/DL (ref 6–23)
CALCIUM SERPL-MCNC: 8.8 MG/DL (ref 8.3–10.6)
CHLORIDE BLD-SCNC: 101 MMOL/L (ref 99–110)
CO2: 22 MMOL/L (ref 21–32)
CREAT SERPL-MCNC: 0.7 MG/DL (ref 0.9–1.3)
EKG ATRIAL RATE: 90 BPM
EKG DIAGNOSIS: NORMAL
EKG P AXIS: 49 DEGREES
EKG P-R INTERVAL: 180 MS
EKG Q-T INTERVAL: 362 MS
EKG QRS DURATION: 82 MS
EKG QTC CALCULATION (BAZETT): 442 MS
EKG R AXIS: -24 DEGREES
EKG T AXIS: 15 DEGREES
EKG VENTRICULAR RATE: 90 BPM
GFR AFRICAN AMERICAN: >60 ML/MIN/1.73M2
GFR NON-AFRICAN AMERICAN: >60 ML/MIN/1.73M2
GLUCOSE BLD-MCNC: 110 MG/DL (ref 70–99)
HCT VFR BLD CALC: 31 % (ref 42–52)
HEMOGLOBIN: 10 GM/DL (ref 13.5–18)
LV EF: 53 %
LVEF MODALITY: NORMAL
MAGNESIUM: 1.6 MG/DL (ref 1.8–2.4)
MCH RBC QN AUTO: 28.6 PG (ref 27–31)
MCHC RBC AUTO-ENTMCNC: 32.3 % (ref 32–36)
MCV RBC AUTO: 88.6 FL (ref 78–100)
PDW BLD-RTO: 14.2 % (ref 11.7–14.9)
PLATELET # BLD: 452 K/CU MM (ref 140–440)
PMV BLD AUTO: 9.4 FL (ref 7.5–11.1)
POTASSIUM SERPL-SCNC: 3.5 MMOL/L (ref 3.5–5.1)
RBC # BLD: 3.5 M/CU MM (ref 4.6–6.2)
SODIUM BLD-SCNC: 133 MMOL/L (ref 135–145)
TROPONIN T: <0.01 NG/ML
WBC # BLD: 10.3 K/CU MM (ref 4–10.5)

## 2021-11-10 PROCEDURE — 6370000000 HC RX 637 (ALT 250 FOR IP): Performed by: INTERNAL MEDICINE

## 2021-11-10 PROCEDURE — 6370000000 HC RX 637 (ALT 250 FOR IP): Performed by: NURSE PRACTITIONER

## 2021-11-10 PROCEDURE — 84484 ASSAY OF TROPONIN QUANT: CPT

## 2021-11-10 PROCEDURE — 93306 TTE W/DOPPLER COMPLETE: CPT

## 2021-11-10 PROCEDURE — 96372 THER/PROPH/DIAG INJ SC/IM: CPT

## 2021-11-10 PROCEDURE — 2580000003 HC RX 258: Performed by: NURSE PRACTITIONER

## 2021-11-10 PROCEDURE — 96365 THER/PROPH/DIAG IV INF INIT: CPT

## 2021-11-10 PROCEDURE — 80048 BASIC METABOLIC PNL TOTAL CA: CPT

## 2021-11-10 PROCEDURE — G0378 HOSPITAL OBSERVATION PER HR: HCPCS

## 2021-11-10 PROCEDURE — 83735 ASSAY OF MAGNESIUM: CPT

## 2021-11-10 PROCEDURE — 93010 ELECTROCARDIOGRAM REPORT: CPT | Performed by: INTERNAL MEDICINE

## 2021-11-10 PROCEDURE — 96366 THER/PROPH/DIAG IV INF ADDON: CPT

## 2021-11-10 PROCEDURE — 85027 COMPLETE CBC AUTOMATED: CPT

## 2021-11-10 PROCEDURE — 2500000003 HC RX 250 WO HCPCS: Performed by: NURSE PRACTITIONER

## 2021-11-10 PROCEDURE — 36415 COLL VENOUS BLD VENIPUNCTURE: CPT

## 2021-11-10 PROCEDURE — 6360000002 HC RX W HCPCS: Performed by: NURSE PRACTITIONER

## 2021-11-10 RX ORDER — MAGNESIUM SULFATE HEPTAHYDRATE 40 MG/ML
2000 INJECTION, SOLUTION INTRAVENOUS ONCE
Status: COMPLETED | OUTPATIENT
Start: 2021-11-10 | End: 2021-11-10

## 2021-11-10 RX ORDER — ATORVASTATIN CALCIUM 20 MG/1
20 TABLET, FILM COATED ORAL NIGHTLY
Status: DISCONTINUED | OUTPATIENT
Start: 2021-11-10 | End: 2021-11-11 | Stop reason: HOSPADM

## 2021-11-10 RX ORDER — MAGNESIUM OXIDE 400 MG/1
400 TABLET ORAL DAILY
Qty: 30 TABLET | Refills: 0 | Status: SHIPPED | OUTPATIENT
Start: 2021-11-11 | End: 2022-08-17

## 2021-11-10 RX ORDER — MAGNESIUM OXIDE 400 MG/1
400 TABLET ORAL DAILY
Status: DISCONTINUED | OUTPATIENT
Start: 2021-11-10 | End: 2021-11-11 | Stop reason: HOSPADM

## 2021-11-10 RX ADMIN — ASPIRIN 81 MG: 81 TABLET, CHEWABLE ORAL at 11:38

## 2021-11-10 RX ADMIN — MAGNESIUM OXIDE 400 MG: 400 TABLET ORAL at 11:38

## 2021-11-10 RX ADMIN — DOCUSATE SODIUM 100 MG: 100 CAPSULE ORAL at 11:38

## 2021-11-10 RX ADMIN — ENOXAPARIN SODIUM 40 MG: 100 INJECTION SUBCUTANEOUS at 11:38

## 2021-11-10 RX ADMIN — MIDODRINE HYDROCHLORIDE 5 MG: 5 TABLET ORAL at 07:59

## 2021-11-10 RX ADMIN — MAGNESIUM SULFATE HEPTAHYDRATE 2000 MG: 2 INJECTION, SOLUTION INTRAVENOUS at 08:12

## 2021-11-10 RX ADMIN — MIDODRINE HYDROCHLORIDE 5 MG: 5 TABLET ORAL at 16:17

## 2021-11-10 RX ADMIN — SODIUM CHLORIDE, PRESERVATIVE FREE 10 ML: 5 INJECTION INTRAVENOUS at 07:58

## 2021-11-10 RX ADMIN — CLOPIDOGREL BISULFATE 75 MG: 75 TABLET ORAL at 11:38

## 2021-11-10 ASSESSMENT — PAIN SCALES - GENERAL: PAINLEVEL_OUTOF10: 0

## 2021-11-10 NOTE — DISCHARGE INSTR - DIET
Good nutrition is important when healing from an illness, injury, or surgery. Follow any nutrition recommendations given to you during your hospital stay. If you were given an oral nutrition supplement while in the hospital, continue to take this supplement at home. You can take it with meals, in-between meals, and/or before bedtime. These supplements can be purchased at most local grocery stores, pharmacies, and chain [employer]-stores. If you have any questions about your diet or nutrition, call the hospital and ask for the dietitian.       Resume previous home diet

## 2021-11-10 NOTE — H&P
HISTORY AND PHYSICAL             Date: 11/9/2021        Patient Name: Teresa Zamora     YOB: 1945      Age:  77 y.o. Chief Complaint     Chief Complaint   Patient presents with    Altered Mental Status     seen yesterday for same per ems, pt having intermittent nonresponsive(slouching in chair, protecting airway, will track people with eyes, but not responsive to commands) episodes witnessed by ems and PT at pt home lasting approx 15 mins today, alert and oriented immediately upon becoming alert,            History Obtained From   patient, electronic medical record    History of Present Illness   46-year-old male status post radical prostatectomy 3 weeks ago who presents with reports of another episode of unconsciousness. Patient has had a few episodes recently where he lost consciousness including an episode in his primary care physician's office yesterday which prompted a visit to the ED yesterday. Today patient reports there was another episode where he slouched in the chair was not responsive to commands. Patient reports he gets lightheaded and passes out with standing. Reports he has had a recurring issue since his prostatectomy approximately 1 month ago.   Additional review of symptoms as noted below    Past Medical History     Past Medical History:   Diagnosis Date    Acid reflux     Diverticulitis Last Episode In 2013    Hx of blood clots Dx 2013    \"Left Arm\"    Hyperlipidemia     Hypertension     Parathyroid adenoma     Prostate Cancer Dx 2016    2 Prostate Biopsies Done, Last Done In 2017    Teeth missing     Upper And Lower    Wears glasses     Wears partial dentures     Upper        Past Surgical History     Past Surgical History:   Procedure Laterality Date    CARDIAC CATHETERIZATION  2015    COLONOSCOPY  Last Done In 2000's    Polyps Removed In Past    DENTAL SURGERY      Teeth Extracted In Past    [address] Right 09/04/2018    PROSTATE BIOPSY  2016, 2017    \"Prostate Cancer Dx In 2016\"   [address]        Medications Prior to Admission     Prior to Admission medications    Medication Sig Start Date End Date Taking? Authorizing Provider   cyclobenzaprine (FLEXERIL) 10 MG tablet Take 5 mg by mouth 3 times daily as needed 10/29/21  Yes Historical Provider, MD   midodrine (PROAMATINE) 5 MG tablet Take 2.5-5 mg by mouth 3 times daily 11/8/21  Yes Historical Provider, MD   mirtazapine (REMERON) 15 MG tablet Take 15 mg by mouth nightly 11/8/21 11/8/22 Yes Historical Provider, MD   oxybutynin (DITROPAN) 5 MG tablet Take 5 mg by mouth 3 times daily as needed 10/29/21  Yes Historical Provider, MD   oxyCODONE (ROXICODONE) 5 MG immediate release tablet Take 5 mg by mouth every 6 hours as needed. 10/29/21  Yes Historical Provider, MD   PARoxetine (PAXIL) 20 MG tablet Take 20 mg by mouth daily 11/8/21 11/8/22 Yes Historical Provider, MD   rosuvastatin (CRESTOR) 20 MG tablet Take 20 mg by mouth nightly 7/13/21 7/13/22 Yes Historical Provider, MD   polyethylene glycol (GLYCOLAX) 17 GM/SCOOP powder Take 17 g by mouth daily 10/29/21 11/13/21 Yes Historical Provider, MD   docusate sodium (COLACE) 100 MG capsule Take 1 capsule by mouth 2 times daily 11/6/21   MONIKA Lemon MD   senna (SENOKOT) 8.6 MG tablet Take 2 tablets by mouth nightly as needed for Constipation 11/6/21 12/6/21  Annalise Mcmahon MD   carvedilol (COREG) 3.125 MG tablet Take 3.125 mg by mouth 2 times daily (with meals)    Historical Provider, MD   aspirin 81 MG chewable tablet Take 1 tablet by mouth daily 3/7/19   Lance Stewart MD   atorvastatin (LIPITOR) 80 MG tablet Take 1 tablet by mouth nightly 3/6/19   Lance Stewart MD   clopidogrel (PLAVIX) 75 MG tablet Take 1 tablet by mouth daily 3/7/19   Lance Stewart MD   nitroGLYCERIN (NITROSTAT) 0.4 MG SL tablet Place 1 tablet under tongue upon chest pain, wait 5 minutes and may repeat up to 3 doses in 15 minutes.  Do not crush or break. 3/6/19   Helen Vergara MD   Mineral Oil OIL Take by mouth daily Over The Counter    Historical Provider, MD   tamsulosin (FLOMAX) 0.4 MG capsule Take 0.8 mg by mouth nightly     Historical Provider, MD        Allergies   Sulfa antibiotics    Social History     Social History     Tobacco History     Smoking Status  Never Smoker    Smokeless Tobacco Use  Never Used          Alcohol History     Alcohol Use Status  Yes Comment  \"A Couple Times A Week\"          Drug Use     Drug Use Status  No          Sexual Activity     Sexually Active  Yes Partners  Female                Family History     Family History   Problem Relation Age of Onset    Stroke Mother     Mental Illness Mother     Obesity Mother        Review of Systems   Review of Systems   Constitutional: Negative. HENT: Negative for congestion and sore throat. Eyes: Negative. Respiratory: Negative for cough and shortness of breath. Cardiovascular: Negative for chest pain and leg swelling. Gastrointestinal: Negative for abdominal pain, constipation, nausea and vomiting. Endocrine: Negative. Genitourinary: Negative for dysuria and hematuria. Musculoskeletal: Negative for neck pain. Skin: Negative for wound. Neurological: Positive for dizziness, syncope and light-headedness. All other systems reviewed and are negative. Physical Exam   /78   Pulse 78   Temp 98.2 °F (36.8 °C) (Oral)   Resp 16   Ht 6' 1\" (1.854 m)   Wt 186 lb (84.4 kg)   SpO2 96%   BMI 24.54 kg/m²     Physical Exam  Vitals and nursing note reviewed. Constitutional:       General: He is not in acute distress. Appearance: Normal appearance. HENT:      Head: Normocephalic. Cardiovascular:      Pulses: Normal pulses. Pulmonary:      Effort: Pulmonary effort is normal. No respiratory distress. Breath sounds: No wheezing or rhonchi. Abdominal:      General: Abdomen is flat. Bowel sounds are normal. There is no distension. Musculoskeletal:         General: Normal range of motion. Skin:     General: Skin is warm and dry. Capillary Refill: Capillary refill takes 2 to 3 seconds. Neurological:      General: No focal deficit present. Mental Status: He is alert and oriented to person, place, and time.          Labs      Recent Results (from the past 24 hour(s))   CBC auto diff    Collection Time: 11/09/21  5:20 PM   Result Value Ref Range    WBC 12.6 (H) 4.0 - 10.5 K/CU MM    RBC 3.90 (L) 4.6 - 6.2 M/CU MM    Hemoglobin 11.1 (L) 13.5 - 18.0 GM/DL    Hematocrit 34.9 (L) 42 - 52 %    MCV 89.5 78 - 100 FL    MCH 28.5 27 - 31 PG    MCHC 31.8 (L) 32.0 - 36.0 %    RDW 14.3 11.7 - 14.9 %    Platelets 484 (H) 714 - 440 K/CU MM    MPV 9.3 7.5 - 11.1 FL    Differential Type AUTOMATED DIFFERENTIAL     Segs Relative 80.3 (H) 36 - 66 %    Lymphocytes % 10.8 (L) 24 - 44 %    Monocytes % 8.1 (H) 0 - 4 %    Eosinophils % 0.2 0 - 3 %    Basophils % 0.3 0 - 1 %    Segs Absolute 10.1 K/CU MM    Lymphocytes Absolute 1.4 K/CU MM    Monocytes Absolute 1.0 K/CU MM    Eosinophils Absolute 0.0 K/CU MM    Basophils Absolute 0.0 K/CU MM    Nucleated RBC % 0.0 %    Total Nucleated RBC 0.0 K/CU MM    Total Immature Neutrophil 0.04 K/CU MM    Immature Neutrophil % 0.3 0 - 0.43 %   CMP    Collection Time: 11/09/21  5:20 PM   Result Value Ref Range    Sodium 134 (L) 135 - 145 MMOL/L    Potassium 3.8 3.5 - 5.1 MMOL/L    Chloride 100 99 - 110 mMol/L    CO2 22 21 - 32 MMOL/L    BUN 17 6 - 23 MG/DL    CREATININE 1.0 0.9 - 1.3 MG/DL    Glucose 110 (H) 70 - 99 MG/DL    Calcium 8.9 8.3 - 10.6 MG/DL    Albumin 2.8 (L) 3.4 - 5.0 GM/DL    Total Protein 7.3 6.4 - 8.2 GM/DL    Total Bilirubin 0.7 0.0 - 1.0 MG/DL    ALT 37 10 - 40 U/L    AST 30 15 - 37 IU/L    Alkaline Phosphatase 182 (H) 40 - 129 IU/L    GFR Non-African American >60 >60 mL/min/1.73m2    GFR African American >60 >60 mL/min/1.73m2    Anion Gap 12 4 - 16   Troponin    Collection Time: 11/09/21  5:20 PM Result Value Ref Range    Troponin T <0.010 <0.01 NG/ML   Magnesium    Collection Time: 11/09/21  5:20 PM   Result Value Ref Range    Magnesium 1.7 (L) 1.8 - 2.4 mg/dl   Protime/INR & PTT    Collection Time: 11/09/21  5:20 PM   Result Value Ref Range    Protime 16.6 (H) 11.7 - 14.5 SECONDS    INR 1.28 INDEX    aPTT 26.5 25.1 - 37.1 SECONDS        Imaging/Diagnostics Last 24 Hours   XR CHEST (2 VW)    Result Date: 11/8/2021  EXAMINATION: TWO XRAY VIEWS OF THE CHEST 11/8/2021 12:48 pm COMPARISON: 05/05/2021 HISTORY: ORDERING SYSTEM PROVIDED HISTORY: Chest pain TECHNOLOGIST PROVIDED HISTORY: Reason for exam:->Chest pain Reason for Exam: Chest pain FINDINGS: Increased density in the posterior basilar regions bilaterally worse on the right consistent with bibasilar pulmonary consolidations and/or pleural effusions worse on the right. Otherwise, lung fields are clear. No pneumothorax, pulmonary edema, cardiomegaly or mediastinal widening. Bibasilar pulmonary consolidations and/or pleural effusions worse on the right. Correlation with clinical evidence of pneumonia or atelectasis with associated pleural effusion. Underlying malignancy cannot be excluded. Otherwise, radiographically nonacute chest.     CTA PULMONARY W CONTRAST    Result Date: 11/8/2021  EXAMINATION: CTA OF THE CHEST 11/8/2021 2:55 pm TECHNIQUE: CTA of the chest was performed after the administration of intravenous contrast.  Multiplanar reformatted images are provided for review. MIP images are provided for review. Dose modulation, iterative reconstruction, and/or weight based adjustment of the mA/kV was utilized to reduce the radiation dose to as low as reasonably achievable. COMPARISON: 05/05/2021.  HISTORY: ORDERING SYSTEM PROVIDED HISTORY: syncope, new pleural effusions TECHNOLOGIST PROVIDED HISTORY: Reason for exam:->syncope, new pleural effusions Decision Support Exception - unselect if not a suspected or confirmed emergency medical condition->Emergency Medical Condition (MA) Reason for Exam: syncope, new pleural effusions Acuity: Acute Type of Exam: Initial Additional signs and symptoms: NONE Relevant Medical/Surgical History: 75ML SIOVUE 370 /GFR>60 FINDINGS: Pulmonary Arteries: Pulmonary arteries are adequately opacified for evaluation. No evidence of intraluminal filling defect to suggest pulmonary embolism. Main pulmonary artery is normal in caliber. Mediastinum: No evidence of mediastinal lymphadenopathy. The heart and pericardium demonstrate no acute abnormality. There is no acute abnormality of the thoracic aorta. Lungs/pleura: The lung parenchyma demonstrates small bilateral pleural effusions right greater than left. There is lower lobe atelectasis right greater than left. No pneumothoraces are seen. Upper Abdomen: The visualized portion of the upper abdomen demonstrates interval development of a subcapsular fluid collection involving the dome of the liver measuring 5.2 x 3.2 x 8.5 cm. Soft Tissues/Bones: No acute bone or soft tissue abnormality. 1. No CT evidence for acute pulmonary embolus. 2. Small bilateral pleural effusions with lower lobe atelectasis right greater than left. 3. Interval development of a subcapsular fluid collection within the dome of the liver measuring 5.2 x 3.2 x 8.5 cm. The etiology of this is uncertain and clinical correlation is recommended. Assessment      Hospital Problems           Last Modified POA    Syncope due to orthostatic hypotension 11/9/2021 Yes          Plan   #Syncope, recurrent  #Orthostatic hypotension  Witnessed event without loss of consciousness today, noted LOC yesterday   Telemetry monitoring  EKG with no heart blocks , NSR   Cardiology consult  Denies any seizure-like activity  Positive orthostatic blood pressure 104/68-> 71/53   Received 1 L fluid resuscitation in ED with positive response. Will give one additional liter overnight  Recommend MRI and neurology consult if midodrine and fluids fail to improve BP     #Coronary artery disease with history of PCI   -Consult to cardiology as noted above, follows with Dr. Terry Sargent home aspirin, statin, Plavix. Hold home carvedilol secondary to positive orthostatic hypotension. Midodrine initiated per cardiology   -Echocardiogram ordered, pending   -Vascular ultrasound carotids ordered, pending    #Subcapsilar abdominal  fluid collection on CT  #Prostate cancer s/p Radical prostectomy @ [facility] with bowel perforation   -LFTs not elevated, patient without anemia and is non-toxic appearing on admission. Low suspicion for abscess of the liver    -Status post radical process prostatectomy 3 weeks ago and bowel resection with ex lap at [facility]. Follow up with surgeon Dr Aubrey Crisostomo scheduled and patient to discuss results at that visit with surgeon. Chart review from that hospitalization showed patient had a drain placed via IR forCT A/P 10/25/21 with fluid collection lower aspect liver along right paracolic gutter, large pocket fluid in left abdomen to right and upper pelvis.     -Trend labs    Continue home medications once otherwise noted above for chronic medical conditions including but not limited to diverticulitis, hypertension, hyperlipidemia, idiopathic parathyroidism, paroxysmal A. fib    Diet ADULT DIET; Regular     DVT Prophylaxis [x] Lovenox, []  Heparin, [] SCDs, [] Ambulation   GI Prophylaxis [] PPI,  [] H2 Blocker,  [] Carafate,  [x] Diet/Tube Feeds   Code Status Full Code   Disposition Patient requires continued admission due to Syncope     MDM [] Low, [x] Moderate,[x]  High  Patient's risk as above      Patient discussed with and evaluated by Dr Lavelle Dodson who agrees with the above diagnosis, treatment and disposition. All testing  and results reviewed with patient . All questions answered.  Patient and family voiced understanding             Consultations Ordered:  IP CONSULT TO CARDIOLOGY  IP CONSULT TO HOSPITALIST    Electronically signed by DAYNA Lindsay CNP on 11/9/21 at 7:59 PM EST

## 2021-11-10 NOTE — PROGRESS NOTES
Daily Progress Note    Awake and alert feeling better  Feeling better this am  BP is stable  Increase activity   Check echo   Hx of CAD on meds  Ortho BP noted   If clinically stable may be able to go home today   Will get zio patch as outpatient  Ambulate and seen how he does and my be able to d/c      No new episodes of dizziness, lightheadedness, or near syncope,  BP looking better this AM.  Replace mag  Increase activity  Will get another set of orthostatics since fluids and midodrine has been started. Syncopal episode    Happened on 11/8/2021 at PCP's office then again yesterday. These occur when he goes from sitting to standing. Orthostatic hypotension: positive; BP went from 104/68-71/53    Pt. Started on midodrine and fluids. BP this AM improved    Did stand up to go to the bathroom in the middle of the night and didn't have any episodes of dizziness, lightheadedness or near syncope    Carotids negative    Echo pending    Will need holter monitor outpatinet     Continue to increase activity. Hx of Prostate CA    S/p Radical prostatectomy 3 weeks ago at [facility]    Hx of CAD. Continue Asa, plavix, and lipitor    CT scan showed subcapsular fluid collection of the liver. Carotid duplex 11/9/2021   Impression   The right internal carotid artery demonstrates 0-50% stenosis.       The left internal carotid artery demonstrates 0-50% stenosis.       Bilateral vertebral arteries are patent with flow in the normal direction. CT chest 11/9/2021  Impression   1. No CT evidence for acute pulmonary embolus. 2. Small bilateral pleural effusions with lower lobe atelectasis right   greater than left. 3. Interval development of a subcapsular fluid collection within the dome of   the liver measuring 5.2 x 3.2 x 8.5 cm.  The etiology of this is uncertain   and clinical correlation is recommended.       Echo 5/5/2021-Summary   Left ventricular systolic function is normal.   Ejection fraction is visually estimated at 60%. Mild left ventricular hypertrophy. Mild to moderate mitral regurgitation is present. Moderate pulmonic regurgitation present. No evidence of any pericardial effusion. PAST MEDICAL HISTORY:  1. Coronary artery disease, had angioplasty done in 2019, repeat heart  cath in 2021, was patent stent. 2.  Hypertension. 3.  Hyperlipidemia. 4.  Prostate cancer, follows with urology. 5.  He has a left arm DVT in the remote past.  6.  Parathyroid adenoma present.     PAST SURGICAL HISTORY:  1. Vasectomy. 2.  Prostate biopsy done. 3.  Angioplasty to LAD in 2019.     SOCIAL HISTORY:  He does not smoke, he does not drink. 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     Medications: At home he is on,  1. Coreg, low-dose 3.125 b.i.d.  2.  Aspirin. 3.  Lipitor. 4.  Plavix.     Objective:   /68   Pulse 71   Temp 98.2 °F (36.8 °C) (Oral)   Resp 18   Ht 6' 1\" (1.854 m)   Wt 186 lb (84.4 kg)   SpO2 97%   BMI 24.54 kg/m²     Intake/Output Summary (Last 24 hours) at 11/10/2021 0739  Last data filed at 11/9/2021 2032  Gross per 24 hour   Intake 1000 ml   Output --   Net 1000 ml       Medications:   Scheduled Meds:   magnesium sulfate  2,000 mg IntraVENous Once    atorvastatin  20 mg Oral Nightly    midodrine  5 mg Oral TID WC    tamsulosin  0.8 mg Oral Nightly    aspirin  81 mg Oral Daily    clopidogrel  75 mg Oral Daily    docusate sodium  100 mg Oral BID    polyethylene glycol  17 g Oral Daily    sodium chloride flush  5-40 mL IntraVENous 2 times per day    enoxaparin  40 mg SubCUTAneous Daily      Infusions:   sodium chloride        PRN Meds:  senna, sodium chloride flush, sodium chloride, potassium chloride **OR** potassium alternative oral replacement **OR** potassium chloride, magnesium sulfate, ondansetron **OR** ondansetron, acetaminophen **OR** acetaminophen       Physical Exam:  Vitals:    11/10/21 0000   BP: 113/68   Pulse: 71   Resp: 18   Temp:    SpO2: 97%        General: AAO, NAD  Chest: Nontender  Cardiac: First and Second Heart Sounds are Normal, No Murmurs or Gallops noted  Lungs:Clear to auscultation and percussion. Abdomen: Soft, NT, ND, +BS  Extremities: No clubbing, no edema  Vascular:  Equal 2+ peripheral pulses. Lab Data:  CBC:   Recent Labs     11/08/21  1135 11/09/21  1720 11/10/21  0423   WBC 12.3* 12.6* 10.3   HGB 10.8* 11.1* 10.0*   HCT 34.2* 34.9* 31.0*   MCV 91.0 89.5 88.6   * 507* 452*     BMP:   Recent Labs     11/08/21  1135 11/09/21  1720 11/10/21  0423    134* 133*   K 4.6 3.8 3.5   CL 98* 100 101   CO2 22 22 22   BUN 16 17 13   CREATININE 0.9 1.0 0.7*     LIVER PROFILE:   Recent Labs     11/08/21  1135 11/09/21  1720   AST 29 30   ALT 39 37   BILITOT 0.7 0.7   ALKPHOS 202* 182*     PT/INR:   Recent Labs     11/09/21  1720   PROTIME 16.6*   INR 1.28     APTT:   Recent Labs     11/09/21  1720   APTT 26.5     BNP:  No results for input(s): BNP in the last 72 hours.       Assessment:  Patient Active Problem List    Diagnosis Date Noted    Syncope due to orthostatic hypotension 11/09/2021    Moderate malnutrition (HCC) 10/31/2021    Generalized weakness     Uncontrolled pain     Small bowel perforation (HCC)     Acute kidney injury (SUSY) with acute tubular necrosis (ATN) (HCC)     Paroxysmal atrial fibrillation (HCC)     Diverticulitis     History of DVT (deep vein thrombosis)     Mild protein-calorie malnutrition ([address]     Critical illness myopathy 10/29/2021    Chest pain 05/05/2021    CAD in native artery 03/05/2019    Angina pectoris ([address] 03/04/2019    Idiopathic parathyroidism ([address]     Hyperparathyroidism ([address]     Chest pain at rest 01/12/2014    DVT of upper extremity (deep vein thrombosis) ([address] 01/12/2014    Prostate cancer ([address] 01/12/2014    Neck pain on left side 01/12/2014    Essential hypertension 01/12/2014    Hyperlipemia 01/12/2014       Electronically signed by DAYNA Khanna - CNP on 11/10/2021 at 7:39 AM    Electronically signed by Wes Curran MD on 11/10/21 at 9:05 AM EST

## 2021-11-10 NOTE — ED NOTES
THE PATIENT IS ASSISTED INTO A STANDING POSITION AND IS USING THE URINAL. THIS NURSE DOES NOT LEAVE HIS SIDE.  HE REMAINS ALERT AND IS ABLE TO VOIDED 983 CC     [facility] Later, [facility]  11/10/21 1206

## 2021-11-10 NOTE — ED NOTES
Received report from [employer]. The patient is alert and currently on the [facility] per Sayda WALDEN.       Marco Shields RN  11/10/21 0948

## 2021-11-10 NOTE — ED NOTES
The patient is able to sit on the [facility] with assistance. He does state he is fine sitting on it and has a small BM. He is assisted in cleaning himself per the EDT. He remains on the telemetry as the cardiac tech has finished with his echo.       Melissa York RN  11/10/21 5467

## 2021-11-10 NOTE — DISCHARGE SUMMARY
Discharge Summary    Name:  Denise Rosa /Age/Sex: 1945  (77 y.o. male)   MRN & CSN:  [id number] & [id number] Admission Date/Time: 2021  2:10 PM   Attending:  Bonnie Reaves MD Discharging Physician: Bonnie Reaves MD     Hospital Course:   Denise Rosa is a 77 y.o.  male  who presents with <principal problem not specified>syncope and hypotension    Patient was admitted and monitored. Medication was held and BP monitored given IV fluids. Cardiology was consulted, echo was unremarkable, it was felt the issue was due to orthostatic hypotension. Orthostatics positive initially. He was feeling better, BP up and repeat orthostatics today were negative. In discussion today with patient he states that due to his recent surgery and illness he was not eating or drinking much, likely dehydrated in the face of being on carvedilol and became hypotensive, he is much better off med for now and properly hydrated. There were no issues and the remainder of his stay was uneventful    1. SYNCOPE SECONDARY TO ORTHOSTATIC HYPOTENSION  -resolved. BP up. DC home, patient was advised to hold carvedilol and midodrine. He has a BP monitor at home and will check, follow up PCP in 2 days for BP check    2. ABNORMAL CT ABDOMEN  -as noted, fluid collection. He will be following up with cancer specialist tomorrow, he will be given CT disc to take with him    3. CAD WITH H/O STENT  -no issues, likely will restart carvedilol once BP noted to be stable    4. PROSTATE CANCER  -S/P prostatectomy, following up tomorrow    I did speak with wife by phone to discuss his condition and DC recommendations        The patient expressed appropriate understanding of and agreement with the discharge recommendations, medications, and plan.      Consults this admission:  IP CONSULT TO CARDIOLOGY  IP CONSULT TO HOSPITALIST    Discharge Instruction:   Follow up appointments: cancer  tomorrow  Primary care physician:  within 2 days    Diet: cardiac diet   Activity: activity as tolerated  Disposition: Discharged to:   [x]Home, [][facility], []SNF, []Acute Rehab, []Hospice   Condition on discharge: Stable    Discharge Medications:        Medication List      START taking these medications    magnesium oxide 400 MG tablet  Commonly known as: MAG-OX  Take 1 tablet by mouth daily  Start taking on: November 11, 2021        Aide Greene taking these medications    aspirin 81 MG chewable tablet  Take 1 tablet by mouth daily     atorvastatin 80 MG tablet  Commonly known as: LIPITOR  Take 1 tablet by mouth nightly     clopidogrel 75 MG tablet  Commonly known as: PLAVIX  Take 1 tablet by mouth daily     cyclobenzaprine 10 MG tablet  Commonly known as: FLEXERIL     docusate sodium 100 MG capsule  Commonly known as: COLACE  Take 1 capsule by mouth 2 times daily     Mineral Oil Oil     mirtazapine 15 MG tablet  Commonly known as: REMERON     nitroGLYCERIN 0.4 MG SL tablet  Commonly known as: NITROSTAT  Place 1 tablet under tongue upon chest pain, wait 5 minutes and may repeat up to 3 doses in 15 minutes. Do not crush or break.      oxybutynin 5 MG tablet  Commonly known as: DITROPAN     oxyCODONE 5 MG immediate release tablet  Commonly known as: ROXICODONE     PARoxetine 20 MG tablet  Commonly known as: PAXIL     polyethylene glycol 17 GM/SCOOP powder  Commonly known as: GLYCOLAX     senna 8.6 MG tablet  Commonly known as: SENOKOT  Take 2 tablets by mouth nightly as needed for Constipation     tamsulosin 0.4 MG capsule  Commonly known as: FLOMAX        STOP taking these medications    carvedilol 3.125 MG tablet  Commonly known as: COREG     midodrine 5 MG tablet  Commonly known as: PROAMATINE     rosuvastatin 20 MG tablet  Commonly known as: CRESTOR           Where to Get Your Medications      These medications were sent to [address] [place] [address]. - P [phone] - F [phone]  [address] [place] [id number]    Phone: [phone] · magnesium oxide 400 MG tablet         Objective Findings at Discharge:   BP (!) 150/94   Pulse 97   Temp 98.5 °F (36.9 °C) (Oral)   Resp 18   Ht 6' 1\" (1.854 m)   Wt 186 lb (84.4 kg)   SpO2 100%   BMI 24.54 kg/m²            PHYSICAL EXAM   GEN Awake male, sitting upright in bed in no apparent distress. Appears given age. EYES Pupils are equally round. No scleral erythema, discharge, or conjunctivitis. HENT Mucous membranes are moist. Oral pharynx without exudates, no evidence of thrush. NECK Supple, no apparent thyromegaly or masses. RESP Clear to auscultation, no wheezes, rales or rhonchi. Symmetric chest movement while on room air. CARDIO/VASC S1/S2 auscultated. Regular rate without appreciable murmurs, rubs, or gallops. No JVD or carotid bruits. Peripheral pulses equal bilaterally and palpable. No peripheral edema. GI Abdomen is soft without significant tenderness, masses, or guarding. Bowel sounds are normoactive. Rectal exam deferred. HEME/LYMPH No palpable cervical lymphadenopathy and no hepatosplenomegaly. No petechiae or ecchymoses. MSK No gross joint deformities. SKIN Normal coloration, warm, dry. NEURO Cranial nerves appear grossly intact, normal speech, no lateralizing weakness. PSYCH Awake, alert, oriented x 4. Affect appropriate. BMP/CBC  Recent Labs     11/08/21  1135 11/09/21  1720 11/10/21  0423    134* 133*   K 4.6 3.8 3.5   CL 98* 100 101   CO2 22 22 22   BUN 16 17 13   CREATININE 0.9 1.0 0.7*   WBC 12.3* 12.6* 10.3   HCT 34.2* 34.9* 31.0*   * 507* 452*       IMAGING:  CTA OF THE CHEST 11/8/2021 2:55 pm       TECHNIQUE:   CTA of the chest was performed after the administration of intravenous   contrast.  Multiplanar reformatted images are provided for review.  MIP   images are provided for review.  Dose modulation, iterative reconstruction,   and/or weight based adjustment of the mA/kV was utilized to reduce the   radiation dose to as low as reasonably achievable.       COMPARISON:   05/05/2021.       HISTORY:   ORDERING SYSTEM PROVIDED HISTORY: syncope, new pleural effusions   TECHNOLOGIST PROVIDED HISTORY:   Reason for exam:->syncope, new pleural effusions   Decision Support Exception - unselect if not a suspected or confirmed   emergency medical condition->Emergency Medical Condition (MA)   Reason for Exam: syncope, new pleural effusions   Acuity: Acute   Type of Exam: Initial   Additional signs and symptoms: NONE   Relevant Medical/Surgical History: 75ML SIOVUE 370 /GFR>60       FINDINGS:   Pulmonary Arteries: Pulmonary arteries are adequately opacified for   evaluation.  No evidence of intraluminal filling defect to suggest pulmonary   embolism.  Main pulmonary artery is normal in caliber.       Mediastinum: No evidence of mediastinal lymphadenopathy.  The heart and   pericardium demonstrate no acute abnormality.  There is no acute abnormality   of the thoracic aorta.       Lungs/pleura: The lung parenchyma demonstrates small bilateral pleural   effusions right greater than left.  There is lower lobe atelectasis right   greater than left.  No pneumothoraces are seen.       Upper Abdomen: The visualized portion of the upper abdomen demonstrates   interval development of a subcapsular fluid collection involving the dome of   the liver measuring 5.2 x 3.2 x 8.5 cm.       Soft Tissues/Bones: No acute bone or soft tissue abnormality.           Impression   1. No CT evidence for acute pulmonary embolus. 2. Small bilateral pleural effusions with lower lobe atelectasis right   greater than left. 3. Interval development of a subcapsular fluid collection within the dome of   the liver measuring 5.2 x 3.2 x 8.5 cm.  The etiology of this is uncertain   and clinical correlation is recommended.          Discharge Time of 35 minutes    Electronically signed by Bonnie Reaves MD on 11/10/2021 at 6:00 PM

## 2021-11-10 NOTE — ED NOTES
Lying  BP: 124/79  Pulse: 86  Sitting  BP: 106/71  Pulse: 83  Standing  BP: 78/58  Pulse: 97     De Matter  11/10/21 0912

## 2021-11-10 NOTE — ED NOTES
Bed: ED-16  Expected date:   Expected time:   Means of arrival:   Comments:  [facility] bed      Luzmaria Crane, MARTHA  11/09/21 1911

## 2021-11-10 NOTE — CONSULTS
[address] [place] [address], [address] [facility]                                  CONSULTATION    PATIENT NAME: Jaycob Simeon                       :        1945  MED REC NO:   [id number]                          ROOM:       ED16  ACCOUNT NO:   [de-identified]                           ADMIT DATE: 2021  PROVIDER:     Marizol Blake MD    CONSULT DATE:  2021    INDICATION:  Syncope. HISTORY OF PRESENT ILLNESS:  This is a 78-year-old male patient who  comes to hospital having syncopal episode. He said he has been dizzy  and lightheaded. Every time he _____, that he is falling, and he was in  the ER, I think it was yesterday. He came in, yesterday he was here  with altered mental status, seen yesterday by EMS. The patient is  having intermittent nonresponsive slouching in chair, protecting airway  _____, and then today he comes with unresponsiveness present. He is  awake and alert right now, answering questions, not in distress. He is  orthostatic blood pressure positive. The patient has a history of coronary artery disease. He had a heart  catheterization done, angioplasty done, and I did a heart cath on him  back in 2021 and at that time, left main was patent. LAD mid-stent  was patent. Ramus mild disease. Circ to dominant system was patent. RCA is a nondominant was patent and he was placed on low-dose Coreg. He  had a heart cath back in  and underwent angioplasty to the LAD, and  repeat heart cath in , was patent stent present. PAST MEDICAL HISTORY:  1. Coronary artery disease, had angioplasty done in , repeat heart  cath in , was patent stent. 2.  Hypertension. 3.  Hyperlipidemia. 4.  Prostate cancer, follows with urology. 5.  He has a left arm DVT in the remote past.  6.  Parathyroid adenoma present. PAST SURGICAL HISTORY:  1. Vasectomy. 2.  Prostate biopsy done.   3.  Angioplasty to LAD in 2019. SOCIAL HISTORY:  He does not smoke, he does not drink. I think he is a  retired Felipe at [employer]. Medications: At home he is on,  1. Coreg, low-dose 3.125 b.i.d.  2.  Aspirin. 3.  Lipitor. 4.  Plavix. PHYSICAL EXAMINATION:  GENERAL:  The patient is awake and alert, answering questions, not in  acute distress. VITAL SIGNS:  Temperature afebrile and pulse is 78, blood pressure  124/78. HEENT:  Head is atraumatic. Pupils are equal and reactive. CHEST:  Equal expansion. LUNGS:  Clear to auscultation. No wheezing or rhonchi  HEART:  Regular rhythm. ABDOMEN:  Soft and nontender. Bowel sounds present. No  hepatosplenomegaly or guarding appreciated. EXTREMITIES:  No cyanosis or clubbing noted. NEUROLOGIC:  Cranial nerves II through XII are grossly intact. DIAGNOSTIC DATA: EKG, sinus rhythm. LABORATORY DATA:  BUN is 17,creatinine 1.0. Troponins are negative. LFTs are normal.  CBC within normal range. IMPRESSION:  This is a 27-year-old male patient who comes with  dizziness, lightheadedness, near syncopal episode present, and blood  pressures are positive for orthostatics. The patient had a CT done  yesterday of the chest.  No evidence of PE noted. Small bowel  perforation noted. Intraabdominal subcapsular fluid collection within  the dome of the liver measuring 5.2 x 3.2 x 8.5 cm. Etiology uncertain. I will watch him for overnight. I will hydrate him and I will get an  echo and possible Holter.           Juan Antonio Pena MD    D: 11/09/2021 18:50:21       T: 11/09/2021 18:53:06     [id number]  Job#: [id number]     Doc#: [id number]    CC:

## 2021-11-20 ENCOUNTER — HOSPITAL ENCOUNTER (OUTPATIENT)
Age: 76
Discharge: HOME | End: 2021-11-20
Payer: MEDICARE

## 2021-11-20 ENCOUNTER — HOSPITAL ENCOUNTER (OUTPATIENT)
Dept: GENERAL RADIOLOGY | Age: 76
Discharge: HOME | End: 2021-11-20
Payer: MEDICARE

## 2021-11-20 DIAGNOSIS — J90 PLEURAL EFFUSION: ICD-10-CM

## 2021-11-20 PROCEDURE — 71046 X-RAY EXAM CHEST 2 VIEWS: CPT

## 2021-11-22 NOTE — DISCHARGE SUMMARY
Prior (baseline) level of function: Independent. Current level of function:         Current  IRF-MAK and Goals:   Occupational Therapy:    Short term goals  Time Frame for Short term goals: STG=LTG :   Long term goals  Time Frame for Long term goals : 5-7 days or until discharge  Long term goal 1: Pt will perform all grooming tasks with Mod I.  Long term goal 2: Pt will perform UB dressing with Mod I.  Long term goal 3: Pt will perform LB dressing with Mod I.  Long term goal 4: Pt will don/doff footwear with Mod I.  Long term goal 5: Pt will perform bathing with Mod I.  Long term goals 6: Pt will perform toileting with Mod I.  Long term goal 7: Pt will perform all fxl transfers (toilet, shower, etc.) with Mod I, DME PRN. Long term goal 8: Pt will participate in therex/theract with emphasis on dynamic stance >10-12 min to increase endurance and dynamic standing tolerance needed for ADLs/IADLs :                                       Eating: Eating  Assistance Needed: Independent  Comment: x  CARE Score: 6  Discharge Goal: Independent       Oral Hygiene: Oral Hygiene  Assistance Needed: Independent  Comment: in stance at sink  CARE Score: 6  Discharge Goal: Independent    UB/LB Bathing: Shower/Bathe Self  Assistance Needed: Independent  Comment: full shower seated  CARE Score: 6  Discharge Goal: Independent    UB Dressing: Upper Body Dressing  Assistance Needed: Independent  Comment: to don pullover sweatshirt  CARE Score: 6  Discharge Goal: Independent         LB Dressing: Lower Body Dressing  Assistance Needed: Independent  Comment: to don brief and pants  CARE Score: 6  Discharge Goal: Independent    Donning and [place] Footwear: Putting On/Taking Off Footwear  Assistance Needed: Independent  Comment: to doff/don slip on shoes  CARE Score: 6  Discharge Goal: Independent      Toileting:  Toileting Hygiene  Assistance Needed: Independent  Comment: mod I  Reason if not Attempted: Patient refused  CARE Score: touching assistance  Physical Assistance Level: No physical assistance  Comment: 6 (deemed usual performance per team huddle)  CARE Score: 4  Discharge Goal: Independent     Walk 50 Feet with Two Turns  Assistance Needed: Supervision or touching assistance  Physical Assistance Level: No physical assistance  Comment: Supervision req cue to LOB with turning when crossing feet req safety cues. Pt able to self correct balance  CARE Score: 4  Discharge Goal: Independent     Walk 150 Feet  Assistance Needed: Supervision or touching assistance  Physical Assistance Level: No physical assistance  Comment: Supervision for balance with RW  Reason if not Attempted: Not attempted due to medical condition or safety concerns  CARE Score: 4  Discharge Goal: Independent     Walking 10 Feet on Uneven Surfaces  Assistance Needed: Supervision or touching assistance  Physical Assistance Level: No physical assistance  Comment: Supervision for balance with use of RW,  CARE Score: 4  Discharge Goal: Independent     1 Step (Curb)  Assistance Needed: Supervision or touching assistance  Physical Assistance Level: No physical assistance  Comment: SBA with RW, demos safety without cuein  CARE Score: 4  Discharge Goal: Independent     4 Steps  Assistance Needed: Supervision or touching assistance  Physical Assistance Level: No physical assistance  Comment: 4 (deemed usual performance per team huddle)  CARE Score: 4  Discharge Goal: Independent     12 Steps  Assistance Needed: Supervision or touching assistance  Physical Assistance Level: No physical assistance  Comment: SB-Supervision due to fatigue, needing to finsh amb last 4 steps in non-recip pattern, req extra time.   Reason if not Attempted: Not attempted due to medical condition or safety concerns  CARE Score: 4  Discharge Goal: Independent       Wheelchair:  w/c Ability: Wheelchair Ability  Uses a Wheelchair and/or Scooter?: No                Balance:        Object: Picking Up Object  Assistance Needed: Independent  Physical Assistance Level: No physical assistance  Comment: at RW using AE  CARE Score: 6  Discharge Goal: Independent    I      Exam:    Blood pressure (!) 97/58, pulse 98, temperature 98.2 °F (36.8 °C), temperature source Oral, resp. rate 18, height 6' 1\" (1.854 m), weight 186 lb 15.2 oz (84.8 kg), SpO2 98 %. General: Sitting up in a wheelchair. Oriented x3. In no distress. HEENT: MMM. Neck supple. No JVD. Pulmonary: Unlabored and clear. Cardiac: Controlled rate. Occasional premature beats. Abdomen: Patient's abdomen is soft and nondistended. Bowel sounds were present throughout. There was no rebound, guarding or masses noted. Incision clean and dry. Upper extremities: Fair  strength. No new bruising or swelling. Lower extremities: 4/5 strength proximally and distally. No signs of DVT. Sitting balance was good. Standing balance was fair-.    Lab Results   Component Value Date    WBC 10.3 11/10/2021    HGB 10.0 (L) 11/10/2021    HCT 31.0 (L) 11/10/2021    MCV 88.6 11/10/2021     (H) 11/10/2021     Lab Results   Component Value Date    INR 1.28 11/09/2021    INR 1.11 08/17/2021    INR 1.15 05/05/2021    PROTIME 16.6 (H) 11/09/2021    PROTIME 14.3 08/17/2021    PROTIME 13.9 05/05/2021     Lab Results   Component Value Date    CREATININE 0.7 (L) 11/10/2021    BUN 13 11/10/2021     (L) 11/10/2021    K 3.5 11/10/2021     11/10/2021    CO2 22 11/10/2021     Lab Results   Component Value Date    ALT 37 11/09/2021    AST 30 11/09/2021    ALKPHOS 182 (H) 11/09/2021    BILITOT 0.7 11/09/2021           The patient presented to the ARU with the above history requiring a multidisciplinary treatment plan including close medical supervision by the [employer] Director. The patient participated in the prescribed therapy treatment plan with reasonable compliance and progressive tolerance.  They avoided significant medical complications. By the time of discharge the patient had become safer with adaptive equipment to transfer and toilet with a FWW with the supervision of family/caregivers. The patient was tolerating an oral diet without choking/coughing and was back to their baseline with regards to bowel and bladder control. Discharge instructions were reviewed with the patient. The patient is to follow up with the surgeon, his urologist and the PCP in 1 week. No driving. [facility] PT/OT/RN will be arranged. Medication List      START taking these medications    docusate sodium 100 MG capsule  Commonly known as: COLACE  Take 1 capsule by mouth 2 times daily     senna 8.6 MG tablet  Commonly known as: SENOKOT  Take 2 tablets by mouth nightly as needed for Constipation        CONTINUE taking these medications    aspirin 81 MG chewable tablet  Take 1 tablet by mouth daily     atorvastatin 80 MG tablet  Commonly known as: LIPITOR  Take 1 tablet by mouth nightly     clopidogrel 75 MG tablet  Commonly known as: PLAVIX  Take 1 tablet by mouth daily     Mineral Oil Oil     nitroGLYCERIN 0.4 MG SL tablet  Commonly known as: NITROSTAT  Place 1 tablet under tongue upon chest pain, wait 5 minutes and may repeat up to 3 doses in 15 minutes. Do not crush or break. tamsulosin 0.4 MG capsule  Commonly known as: FLOMAX        STOP taking these medications    carvedilol 3.125 MG tablet  Commonly known as: COREG           Where to Get Your Medications      You can get these medications from any pharmacy    You don't need a prescription for these medications  · docusate sodium 100 MG capsule  · senna 8.6 MG tablet         CONDITION ON DISCHARGE: Stable. The prognosis is fair for further improvements in ADL's and safety with adapted gait/transfers. Record review, patient exam, discharge instructions, medication reconciliation and summary for this discharge visit took more than 30 minutes.

## 2022-01-17 ENCOUNTER — APPOINTMENT (OUTPATIENT)
Dept: GENERAL RADIOLOGY | Age: 77
End: 2022-01-17
Payer: MEDICARE

## 2022-01-17 ENCOUNTER — HOSPITAL ENCOUNTER (EMERGENCY)
Age: 77
Discharge: HOME | End: 2022-01-17
Attending: EMERGENCY MEDICINE
Payer: MEDICARE

## 2022-01-17 VITALS
HEART RATE: 91 BPM | DIASTOLIC BLOOD PRESSURE: 68 MMHG | WEIGHT: 180 LBS | HEIGHT: 73 IN | SYSTOLIC BLOOD PRESSURE: 125 MMHG | OXYGEN SATURATION: 97 % | RESPIRATION RATE: 16 BRPM | BODY MASS INDEX: 23.86 KG/M2 | TEMPERATURE: 100.1 F

## 2022-01-17 DIAGNOSIS — J40 BRONCHITIS: Primary | ICD-10-CM

## 2022-01-17 DIAGNOSIS — R05.9 COUGH: ICD-10-CM

## 2022-01-17 LAB
ALBUMIN SERPL-MCNC: 3.1 GM/DL (ref 3.4–5)
ALP BLD-CCNC: 122 IU/L (ref 40–129)
ALT SERPL-CCNC: 34 U/L (ref 10–40)
ANION GAP SERPL CALCULATED.3IONS-SCNC: 11 MMOL/L (ref 4–16)
AST SERPL-CCNC: 33 IU/L (ref 15–37)
BASOPHILS ABSOLUTE: 0 K/CU MM
BASOPHILS RELATIVE PERCENT: 0.2 % (ref 0–1)
BILIRUB SERPL-MCNC: 0.4 MG/DL (ref 0–1)
BUN BLDV-MCNC: 24 MG/DL (ref 6–23)
CALCIUM SERPL-MCNC: 9.3 MG/DL (ref 8.3–10.6)
CHLORIDE BLD-SCNC: 94 MMOL/L (ref 99–110)
CO2: 23 MMOL/L (ref 21–32)
CREAT SERPL-MCNC: 1 MG/DL (ref 0.9–1.3)
DIFFERENTIAL TYPE: ABNORMAL
EOSINOPHILS ABSOLUTE: 0 K/CU MM
EOSINOPHILS RELATIVE PERCENT: 0.1 % (ref 0–3)
GFR AFRICAN AMERICAN: >60 ML/MIN/1.73M2
GFR NON-AFRICAN AMERICAN: >60 ML/MIN/1.73M2
GLUCOSE BLD-MCNC: 102 MG/DL (ref 70–99)
HCT VFR BLD CALC: 33.2 % (ref 42–52)
HEMOGLOBIN: 10.1 GM/DL (ref 13.5–18)
IMMATURE NEUTROPHIL %: 0.4 % (ref 0–0.43)
LYMPHOCYTES ABSOLUTE: 2 K/CU MM
LYMPHOCYTES RELATIVE PERCENT: 12.5 % (ref 24–44)
MCH RBC QN AUTO: 25.9 PG (ref 27–31)
MCHC RBC AUTO-ENTMCNC: 30.4 % (ref 32–36)
MCV RBC AUTO: 85.1 FL (ref 78–100)
MONOCYTES ABSOLUTE: 1.5 K/CU MM
MONOCYTES RELATIVE PERCENT: 9.3 % (ref 0–4)
PDW BLD-RTO: 16.1 % (ref 11.7–14.9)
PLATELET # BLD: 370 K/CU MM (ref 140–440)
PMV BLD AUTO: 9.7 FL (ref 7.5–11.1)
POTASSIUM SERPL-SCNC: 4.4 MMOL/L (ref 3.5–5.1)
RBC # BLD: 3.9 M/CU MM (ref 4.6–6.2)
SEGMENTED NEUTROPHILS ABSOLUTE COUNT: 12.1 K/CU MM
SEGMENTED NEUTROPHILS RELATIVE PERCENT: 77.5 % (ref 36–66)
SODIUM BLD-SCNC: 128 MMOL/L (ref 135–145)
TOTAL IMMATURE NEUTOROPHIL: 0.07 K/CU MM
TOTAL PROTEIN: 7.8 GM/DL (ref 6.4–8.2)
WBC # BLD: 15.6 K/CU MM (ref 4–10.5)

## 2022-01-17 PROCEDURE — 99283 EMERGENCY DEPT VISIT LOW MDM: CPT

## 2022-01-17 PROCEDURE — 71046 X-RAY EXAM CHEST 2 VIEWS: CPT

## 2022-01-17 PROCEDURE — 6360000002 HC RX W HCPCS: Performed by: EMERGENCY MEDICINE

## 2022-01-17 PROCEDURE — 96367 TX/PROPH/DG ADDL SEQ IV INF: CPT

## 2022-01-17 PROCEDURE — 85025 COMPLETE CBC W/AUTO DIFF WBC: CPT

## 2022-01-17 PROCEDURE — 96365 THER/PROPH/DIAG IV INF INIT: CPT

## 2022-01-17 PROCEDURE — 80053 COMPREHEN METABOLIC PANEL: CPT

## 2022-01-17 PROCEDURE — 2580000003 HC RX 258: Performed by: EMERGENCY MEDICINE

## 2022-01-17 RX ORDER — ALBUTEROL SULFATE 90 UG/1
2 AEROSOL, METERED RESPIRATORY (INHALATION) EVERY 4 HOURS PRN
Qty: 18 G | Refills: 0 | Status: SHIPPED | OUTPATIENT
Start: 2022-01-17 | End: 2022-08-17

## 2022-01-17 RX ORDER — BENZONATATE 100 MG/1
100 CAPSULE ORAL 3 TIMES DAILY PRN
Qty: 20 CAPSULE | Refills: 0 | Status: SHIPPED | OUTPATIENT
Start: 2022-01-17 | End: 2022-01-24

## 2022-01-17 RX ORDER — PREDNISONE 20 MG/1
40 TABLET ORAL DAILY
Qty: 10 TABLET | Refills: 0 | Status: SHIPPED | OUTPATIENT
Start: 2022-01-17 | End: 2022-01-22

## 2022-01-17 RX ORDER — AZITHROMYCIN 250 MG/1
TABLET, FILM COATED ORAL
Qty: 1 PACKET | Refills: 0 | Status: SHIPPED | OUTPATIENT
Start: 2022-01-17 | End: 2022-01-21

## 2022-01-17 RX ADMIN — CEFTRIAXONE SODIUM 1000 MG: 1 INJECTION, POWDER, FOR SOLUTION INTRAMUSCULAR; INTRAVENOUS at 16:44

## 2022-01-17 RX ADMIN — AZITHROMYCIN MONOHYDRATE 500 MG: 500 INJECTION, POWDER, LYOPHILIZED, FOR SOLUTION INTRAVENOUS at 17:07

## 2022-01-17 NOTE — ED PROVIDER NOTES
Emergency Department Encounter  [address]    Patient: Davida Gilford  MRN: [id number]  : 1945  Date of Evaluation: 2022  ED Provider: Linda Giraldo MD    Chief Complaint       Chief Complaint   Patient presents with    Cough     reports a cough for 3 days reports that it is making him vomit    Emesis     HOPI Davida Gilford is a 77 y.o. male who presents to the emergency department for evaluation of cough fever and shortness of breath. Patient reports been usual state of health until 3 days ago when symptoms restarted. No sick contacts no recent travel no changes of diet or medications no new swelling of legs or feet or history of DVTs or PEs. Patient does report has been having a productive cough which he says sounds very deep. He says occasionally he has paroxysms of coughing which makes him vomit afterwards. He is fully immunized against COVID including booster and he also had his flu shot this year. Patient denies chest pain shortness of breath headache rashes or stiff neck. Patient reports he has been taking over-the-counter medications and needed Mucinex for symptoms with only minimal improvement    ROS:     At least 10 systems reviewed and otherwise acutely negative except as in the [address]     Past History     Past Medical History:   Diagnosis Date    Acid reflux     Diverticulitis Last Episode In     Hx of blood clots Dx     \"Left Arm\"    Hyperlipidemia     Hypertension     Parathyroid adenoma     Prostate Cancer Dx 2016    2 Prostate Biopsies Done, Last Done In 2017    Teeth missing     Upper And Lower    Wears glasses     Wears partial dentures     Upper     Past Surgical History:   Procedure Laterality Date    CARDIAC CATHETERIZATION      COLONOSCOPY  Last Done In 's    Polyps Removed In Past    DENTAL SURGERY      Teeth Extracted In Past   [address]    PARATHYROIDECTOMY Right 2018    PROSTATE BIOPSY 2016, 2017    \"Prostate Cancer Dx In 2016\"    VASECTOMY  1986     Social History     Socioeconomic History    Marital status:      Spouse name: None    Number of children: None    Years of education: None    Highest education level: None   Occupational History    None   Tobacco Use    Smoking status: Never Smoker    Smokeless tobacco: Never Used   Vaping Use    Vaping Use: Never used   Substance and Sexual Activity    Alcohol use: Yes     Comment: \"A Couple Times A Week\"    Drug use: No    Sexual activity: Yes     Partners: Female   Other Topics Concern    None   Social History Narrative    None     Social Determinants of Health     Financial Resource Strain:     Difficulty of Paying Living Expenses: Not on file   Food Insecurity:     Worried About Running Out of Food in the Last Year: Not on file    Imani of Food in the Last Year: Not on file   Transportation Needs:     Lack of Transportation (Medical): Not on file    Lack of Transportation (Non-Medical):  Not on file   Physical Activity:     Days of Exercise per Week: Not on file    Minutes of Exercise per Session: Not on file   Stress:     Feeling of Stress : Not on file   Social Connections:     Frequency of Communication with Friends and Family: Not on file    Frequency of Social Gatherings with Friends and Family: Not on file    Attends Taoist Services: Not on file    Active Member of [address] [place] [address] [employer] or Organizations: Not on file    Attends Club or Organization Meetings: Not on file    Marital Status: Not on file   Intimate Partner Violence:     Fear of Current or Ex-Partner: Not on file    Emotionally Abused: Not on file    Physically Abused: Not on file    Sexually Abused: Not on file   Housing Stability:     Unable to Pay for Housing in the Last Year: Not on file    Number of [place] in the Last Year: Not on file    Unstable Housing in the Last Year: Not on file       Medications/Allergies     Previous Medications ASPIRIN 81 MG CHEWABLE TABLET    Take 1 tablet by mouth daily    ATORVASTATIN (LIPITOR) 80 MG TABLET    Take 1 tablet by mouth nightly    CLOPIDOGREL (PLAVIX) 75 MG TABLET    Take 1 tablet by mouth daily    CYCLOBENZAPRINE (FLEXERIL) 10 MG TABLET    Take 5 mg by mouth 3 times daily as needed    DOCUSATE SODIUM (COLACE) 100 MG CAPSULE    Take 1 capsule by mouth 2 times daily    MAGNESIUM OXIDE (MAG-OX) 400 MG TABLET    Take 1 tablet by mouth daily    MINERAL OIL OIL    Take by mouth daily Over The Counter    MIRTAZAPINE (REMERON) 15 MG TABLET    Take 15 mg by mouth nightly    NITROGLYCERIN (NITROSTAT) 0.4 MG SL TABLET    Place 1 tablet under tongue upon chest pain, wait 5 minutes and may repeat up to 3 doses in 15 minutes. Do not crush or break. OXYBUTYNIN (DITROPAN) 5 MG TABLET    Take 5 mg by mouth 3 times daily as needed    OXYCODONE (ROXICODONE) 5 MG IMMEDIATE RELEASE TABLET    Take 5 mg by mouth every 6 hours as needed. PAROXETINE (PAXIL) 20 MG TABLET    Take 20 mg by mouth daily    TAMSULOSIN (FLOMAX) 0.4 MG CAPSULE    Take 0.8 mg by mouth nightly      Allergies   Allergen Reactions    Sulfa Antibiotics      \"Tongue Coats\"        Physical Exam       ED Triage Vitals [01/17/22 1357]   BP Temp Temp Source Pulse Resp SpO2 Height Weight   125/68 100.1 °F (37.8 °C) Oral 91 16 97 % 6' 1\" (1.854 m) 180 lb (81.6 kg)     GENERAL APPEARANCE: Awake and alert. Cooperative. No acute distress. HEAD: Normocephalic. Atraumatic. EYES: Sclera anicteric. Pupils equal round reactive to light extraocular movements are intact  ENT: Tolerates saliva. No trismus. Moist mucous membranes  NECK: Supple. Trachea midline. No meningismus  CARDIO: RRR. Radial pulse 2+. No murmurs rubs or gallops appreciated  LUNGS: Respirations unlabored. CTAB. No accessory muscle usage noted. No wheezes rales rhonchi or stridor. Occasional nonproductive cough noted  ABDOMEN: Soft. Non-distended. Non-tender.   No tenderness in right upper quadrant or right lower quadrant to deep palpation  EXTREMITIES: No acute deformities. No unilateral leg swelling or tenderness behind either one of calves  SKIN: Warm and dry. No erythema edema or rashes appreciated. Warm to touch  NEUROLOGICAL:  Cranial nerves II through XII grossly intact. No gross facial drooping. Moves all 4 extremities spontaneously. PSYCHIATRIC: Normal mood. Alert and oriented x3. No reported active suicidality or homicidality.     Diagnostics   Labs:  Results for orders placed or performed during the hospital encounter of 01/17/22   CBC Auto Differential   Result Value Ref Range    WBC 15.6 (H) 4.0 - 10.5 K/CU MM    RBC 3.90 (L) 4.6 - 6.2 M/CU MM    Hemoglobin 10.1 (L) 13.5 - 18.0 GM/DL    Hematocrit 33.2 (L) 42 - 52 %    MCV 85.1 78 - 100 FL    MCH 25.9 (L) 27 - 31 PG    MCHC 30.4 (L) 32.0 - 36.0 %    RDW 16.1 (H) 11.7 - 14.9 %    Platelets 206 305 - 847 K/CU MM    MPV 9.7 7.5 - 11.1 FL    Differential Type AUTOMATED DIFFERENTIAL     Segs Relative 77.5 (H) 36 - 66 %    Lymphocytes % 12.5 (L) 24 - 44 %    Monocytes % 9.3 (H) 0 - 4 %    Eosinophils % 0.1 0 - 3 %    Basophils % 0.2 0 - 1 %    Segs Absolute 12.1 K/CU MM    Lymphocytes Absolute 2.0 K/CU MM    Monocytes Absolute 1.5 K/CU MM    Eosinophils Absolute 0.0 K/CU MM    Basophils Absolute 0.0 K/CU MM    Immature Neutrophil % 0.4 0 - 0.43 %    Total Immature Neutrophil 0.07 K/CU MM   Comprehensive Metabolic Panel w/ Reflex to MG   Result Value Ref Range    Sodium 128 (L) 135 - 145 MMOL/L    Potassium 4.4 3.5 - 5.1 MMOL/L    Chloride 94 (L) 99 - 110 mMol/L    CO2 23 21 - 32 MMOL/L    BUN 24 (H) 6 - 23 MG/DL    CREATININE 1.0 0.9 - 1.3 MG/DL    Glucose 102 (H) 70 - 99 MG/DL    Calcium 9.3 8.3 - 10.6 MG/DL    Albumin 3.1 (L) 3.4 - 5.0 GM/DL    Total Protein 7.8 6.4 - 8.2 GM/DL    Total Bilirubin 0.4 0.0 - 1.0 MG/DL    ALT 34 10 - 40 U/L    AST 33 15 - 37 IU/L    Alkaline Phosphatase 122 40 - 129 IU/L    GFR Non- >60 >60 mL/min/1.73m2    GFR African American >60 >60 mL/min/1.73m2    Anion Gap 11 4 - 16     Radiographs:  XR CHEST (2 VW)    Result Date: 1/17/2022  EXAMINATION: TWO XRAY VIEWS OF THE CHEST 1/17/2022 3:42 pm COMPARISON: 11/20/2021 HISTORY: ORDERING SYSTEM PROVIDED HISTORY: fever, cough TECHNOLOGIST PROVIDED HISTORY: Reason for exam:->fever, cough FINDINGS: Cardiomediastinal silhouette is stable. Slight increase in volume of right pleural effusion, remains small. Nonspecific right basilar opacities favored to represent atelectasis. The left lung is clear. No pneumothorax. No gross bony abnormality. Slight increase in volume of right pleural effusion, remains small. Nonspecific right basilar opacities favored to represent atelectasis. Procedures/EKG:       ED Course and MDM   In brief, Babak Arevalo is a 77 y.o. male who presented to the emergency department for evaluation of cough and fever. Based on patient's history and physical would be most concerned about possible pneumonia. Other possibilities patient symptoms do include viral processes patient appears clinically quite well in no significant distress or discomfort he is not tachycardic tachypneic nor hypoxemic at this time. He is fully immunized against COVID and influenza this year    I did review patient's imaging studies and laboratory work as noted above. On reevaluation patient was resting comfortably. Patient does have a leukocytosis as well as a low-grade fever here in the emergency department. X-ray interpreted by radiology does show was favored to be atelectasis in the right lower lobe but given the patient's symptomology at would be concerned about overlying infectious etiology. Patient was given ceftriaxone and azithromycin for community-acquired pneumonia. Discussed the findings with patient at bedside. Reviewed the plan of discharge with him he has full agreement with this.   I attempted to answer all of his questions best my ability    Patient be discharged with prescription for azithromycin. Recommend close follow-up with primary care physician or referral physician in the next 48 to 72 hours. Return to the emergency department for chest pain, shortness of breath, fevers, difficulty breathing or any other concerning symptoms. He did express a verbal understanding of these instructions and does feel comfortable to be discharged home    ED Medication Orders (From admission, onward)    Start Ordered     Status Ordering Provider    01/17/22 1630 01/17/22 1629  cefTRIAXone (ROCEPHIN) 1000 mg IVPB in 50 mL D5W minibag  ONCE        Question:  Antimicrobial Indications  Answer:  Pneumonia (CAP)    Acknowledged BARON JORDAN    01/17/22 1630 01/17/22 1629  azithromycin (ZITHROMAX) 500 mg in dextrose 5 % 250 mL IVPB  ONCE        Question:  Antimicrobial Indications  Answer:  Pneumonia (CAP)    Acknowledged BARON JORDAN          Final Impression      1. Bronchitis    2. Cough      DISPOSITION           This patient was cared for in the setting of the COVID-19 pandemic, with nationwide stress on resources and staffing.     (Please note that portions of this note may have been completed with a voice recognition program. Efforts were made to edit the dictations but occasionally words are mis-transcribed.)    Lizett Aguila MD  [address] MD  01/17/22 6412

## 2022-01-17 NOTE — Clinical Note
Mac Annemelquiades was seen and treated in our emergency department on 1/17/2022. He may return to work on 01/19/2022. If you have any questions or concerns, please don't hesitate to call.       Juani Graves MD

## 2022-01-17 NOTE — ED NOTES
Patient discharged with 4 new rx. Patient verbalized understanding of discharge instructions and follow up care.       Iman Recinos RN  01/17/22 6074

## 2022-02-21 ENCOUNTER — HOSPITAL ENCOUNTER (OUTPATIENT)
Age: 77
Discharge: HOME | End: 2022-02-21
Payer: MEDICARE

## 2022-02-21 LAB — PROSTATE SPECIFIC ANTIGEN: 0.01 NG/ML (ref 0–4)

## 2022-02-21 PROCEDURE — G0103 PSA SCREENING: HCPCS

## 2022-02-21 PROCEDURE — 36415 COLL VENOUS BLD VENIPUNCTURE: CPT

## 2022-06-28 ENCOUNTER — TELEPHONE (OUTPATIENT)
Dept: SURGERY | Age: 77
End: 2022-06-28

## 2022-06-28 NOTE — TELEPHONE ENCOUNTER
LVM for pt to call and get scheduled with an appt with Dr. Jackie Montes De Oca for (ventral hernia without obstruction or gangrene) ref Elodia Nayeli from [id number] [facility]

## 2022-07-14 ENCOUNTER — OFFICE VISIT (OUTPATIENT)
Dept: SURGERY | Age: 77
End: 2022-07-14
Payer: MEDICARE

## 2022-07-14 VITALS
HEART RATE: 62 BPM | HEIGHT: 73 IN | WEIGHT: 207.2 LBS | SYSTOLIC BLOOD PRESSURE: 138 MMHG | OXYGEN SATURATION: 97 % | DIASTOLIC BLOOD PRESSURE: 70 MMHG | BODY MASS INDEX: 27.46 KG/M2

## 2022-07-14 DIAGNOSIS — K43.2 INCISIONAL HERNIA, WITHOUT OBSTRUCTION OR GANGRENE: Primary | ICD-10-CM

## 2022-07-14 DIAGNOSIS — K43.9 VENTRAL HERNIA WITHOUT OBSTRUCTION OR GANGRENE: Primary | ICD-10-CM

## 2022-07-14 PROCEDURE — 3017F COLORECTAL CA SCREEN DOC REV: CPT | Performed by: SURGERY

## 2022-07-14 PROCEDURE — G8417 CALC BMI ABV UP PARAM F/U: HCPCS | Performed by: SURGERY

## 2022-07-14 PROCEDURE — 1036F TOBACCO NON-USER: CPT | Performed by: SURGERY

## 2022-07-14 PROCEDURE — 99204 OFFICE O/P NEW MOD 45 MIN: CPT | Performed by: SURGERY

## 2022-07-14 PROCEDURE — G8427 DOCREV CUR MEDS BY ELIG CLIN: HCPCS | Performed by: SURGERY

## 2022-07-14 PROCEDURE — 1124F ACP DISCUSS-NO DSCNMKR DOCD: CPT | Performed by: SURGERY

## 2022-07-14 RX ORDER — CARVEDILOL 3.12 MG/1
3.12 TABLET ORAL 2 TIMES DAILY
COMMUNITY

## 2022-07-14 RX ORDER — MONTELUKAST SODIUM 10 MG/1
10 TABLET ORAL NIGHTLY
COMMUNITY
Start: 2022-06-28 | End: 2022-08-17

## 2022-07-14 RX ORDER — ROSUVASTATIN CALCIUM 20 MG/1
TABLET, COATED ORAL
COMMUNITY
Start: 2022-05-31

## 2022-07-14 ASSESSMENT — ENCOUNTER SYMPTOMS
EYE ITCHING: 0
BACK PAIN: 0
CHOKING: 0
CONSTIPATION: 0
APNEA: 0
PHOTOPHOBIA: 0
ABDOMINAL PAIN: 1
SORE THROAT: 0
EYE REDNESS: 0
ANAL BLEEDING: 0
COLOR CHANGE: 0
RECTAL PAIN: 0
STRIDOR: 0

## 2022-07-14 NOTE — PROGRESS NOTES
Chief Complaint   Patient presents with    New Patient     POSSIBLE VENTRAL HERNIA        SUBJECTIVE:  HPI: Patient presents for evaluation of incisional hernia. Symptoms were first noted 6 months ago. Pain is progressive and worsening . Lump is reducible. Pt denies nausea vomiting. Pt with previous history of abdominal surgery prostate surgery in 5095 complicated with bowel injury requiring ex lap. I have reviewed the patient's(pertinent information to this visit) medical history, family history(scanned in  theMedia tab under \"patient questioner\"), social history and review of systems with the patient today in the office.        Past Surgical History:   Procedure Laterality Date    CARDIAC CATHETERIZATION  2015    COLONOSCOPY  Last Done In 2000's    Polyps Removed In Past    DENTAL SURGERY      Teeth Extracted In Past   [facility]    PARATHYROIDECTOMY Right 09/04/2018    PROSTATE BIOPSY  2016, 2017    \"Prostate Cancer Dx In 2016\"   [address]     Past Medical History:   Diagnosis Date    Acid reflux     Diverticulitis Last Episode In 2013    Hx of blood clots Dx 2013    \"Left Arm\"    Hyperlipidemia     Hypertension     Parathyroid adenoma     Prostate Cancer Dx 2016    2 Prostate Biopsies Done, Last Done In 2017    Teeth missing     Upper And Lower    Wears glasses     Wears partial dentures     Upper        Family History   Problem Relation Age of Onset    Stroke Mother     Mental Illness Mother     Obesity Mother      Social History     Socioeconomic History    Marital status:      Spouse name: Not on file    Number of children: Not on file    Years of education: Not on file    Highest education level: Not on file   Occupational History    Not on file   Tobacco Use    Smoking status: Never Smoker    Smokeless tobacco: Never Used   Vaping Use    Vaping Use: Never used   Substance and Sexual Activity    Alcohol use: Yes     Comment: \"A Couple Times A Week\"  Drug use: No    Sexual activity: Yes     Partners: Female   Other Topics Concern    Not on file   Social History Narrative    Not on file     Social Determinants of Health     Financial Resource Strain:     Difficulty of Paying Living Expenses: Not on file   Food Insecurity:     Worried About Running Out of Food in the Last Year: Not on file    Imani of Food in the Last Year: Not on file   Transportation Needs:     Lack of Transportation (Medical): Not on file    Lack of Transportation (Non-Medical):  Not on file   Physical Activity:     Days of Exercise per Week: Not on file    Minutes of Exercise per Session: Not on file   Stress:     Feeling of Stress : Not on file   Social Connections:     Frequency of Communication with Friends and Family: Not on file    Frequency of Social Gatherings with Friends and Family: Not on file    Attends Restoration Services: Not on file    Active Member of [address] [place] [address] [employer] or Organizations: Not on file    Attends Club or Organization Meetings: Not on file    Marital Status: Not on file   Intimate Partner Violence:     Fear of Current or Ex-Partner: Not on file    Emotionally Abused: Not on file    Physically Abused: Not on file    Sexually Abused: Not on file   Housing Stability:     Unable to Pay for Housing in the Last Year: Not on file    Number of [place] in the Last Year: Not on file    Unstable Housing in the Last Year: Not on file       Current Outpatient Medications   Medication Sig Dispense Refill    carvedilol (COREG) 3.125 MG tablet Take 3.125 mg by mouth 2 times daily      rosuvastatin (CRESTOR) 20 MG tablet TAKE 1 TABLET BY MOUTH EVERYDAY AT BEDTIME      montelukast (SINGULAIR) 10 MG tablet Take 10 mg by mouth nightly      docusate sodium (COLACE) 100 MG capsule Take 1 capsule by mouth 2 times daily      aspirin 81 MG chewable tablet Take 1 tablet by mouth daily 90 tablet 0    clopidogrel (PLAVIX) 75 MG tablet Take 1 tablet by mouth daily 90 abdominal pain. Negative for anal bleeding, constipation and rectal pain. Endocrine: Negative for polydipsia. Genitourinary: Negative for enuresis, flank pain and hematuria. Musculoskeletal: Negative for back pain, joint swelling and myalgias. Skin: Negative for color change and pallor. Allergic/Immunologic: Negative for environmental allergies. Neurological: Negative for syncope and speech difficulty. Psychiatric/Behavioral: Negative for confusion and hallucinations. OBJECTIVE:  Physical Exam:    /70   Pulse 62   Ht 6' 1\" (1.854 m)   Wt 207 lb 3.2 oz (94 kg)   SpO2 97%   BMI 27.34 kg/m²      Physical Exam  Constitutional:       Appearance: He is well-developed. HENT:      Head: Normocephalic. Eyes:      Pupils: Pupils are equal, round, and reactive to light. Cardiovascular:      Rate and Rhythm: Normal rate. Pulmonary:      Effort: Pulmonary effort is normal.   Abdominal:      General: There is no distension. Palpations: Abdomen is soft. There is no mass. Tenderness: There is abdominal tenderness. There is no guarding or rebound. Hernia: A hernia is present. Musculoskeletal:         General: Normal range of motion. Cervical back: Normal range of motion and neck supple. Skin:     General: Skin is warm. Neurological:      Mental Status: He is alert and oriented to person, place, and time. ASSESSMENT:  1. Incisional hernia, without obstruction or gangrene          PLAN:  Treatment: Will obtain CT a/p non contrast.  Patient counseled on risks, benefits, and alternatives of treatment plan at length. Patient states anunderstanding and willingness to proceed with plan. No orders of the defined types were placed in this encounter. No orders of the defined types were placed in this encounter. Follow Up:  No follow-ups on file.       Venkat Hawkins MD

## 2022-07-19 ENCOUNTER — HOSPITAL ENCOUNTER (OUTPATIENT)
Dept: CT IMAGING | Age: 77
Discharge: HOME | End: 2022-07-19
Payer: MEDICARE

## 2022-07-19 DIAGNOSIS — K43.9 VENTRAL HERNIA WITHOUT OBSTRUCTION OR GANGRENE: ICD-10-CM

## 2022-07-19 PROCEDURE — 74176 CT ABD & PELVIS W/O CONTRAST: CPT

## 2022-07-28 ENCOUNTER — OFFICE VISIT (OUTPATIENT)
Dept: SURGERY | Age: 77
End: 2022-07-28
Payer: MEDICARE

## 2022-07-28 VITALS
WEIGHT: 209.8 LBS | DIASTOLIC BLOOD PRESSURE: 82 MMHG | BODY MASS INDEX: 27.8 KG/M2 | HEIGHT: 73 IN | HEART RATE: 88 BPM | SYSTOLIC BLOOD PRESSURE: 120 MMHG | OXYGEN SATURATION: 100 %

## 2022-07-28 DIAGNOSIS — K43.9 VENTRAL HERNIA WITHOUT OBSTRUCTION OR GANGRENE: Primary | ICD-10-CM

## 2022-07-28 PROCEDURE — 1036F TOBACCO NON-USER: CPT | Performed by: SURGERY

## 2022-07-28 PROCEDURE — 99214 OFFICE O/P EST MOD 30 MIN: CPT | Performed by: SURGERY

## 2022-07-28 PROCEDURE — 3017F COLORECTAL CA SCREEN DOC REV: CPT | Performed by: SURGERY

## 2022-07-28 PROCEDURE — G8417 CALC BMI ABV UP PARAM F/U: HCPCS | Performed by: SURGERY

## 2022-07-28 PROCEDURE — 1124F ACP DISCUSS-NO DSCNMKR DOCD: CPT | Performed by: SURGERY

## 2022-07-28 PROCEDURE — G8427 DOCREV CUR MEDS BY ELIG CLIN: HCPCS | Performed by: SURGERY

## 2022-07-28 ASSESSMENT — PATIENT HEALTH QUESTIONNAIRE - PHQ9
2. FEELING DOWN, DEPRESSED OR HOPELESS: 0
SUM OF ALL RESPONSES TO PHQ9 QUESTIONS 1 & 2: 0
SUM OF ALL RESPONSES TO PHQ QUESTIONS 1-9: 0
1. LITTLE INTEREST OR PLEASURE IN DOING THINGS: 0
SUM OF ALL RESPONSES TO PHQ QUESTIONS 1-9: 0

## 2022-07-28 ASSESSMENT — ENCOUNTER SYMPTOMS
RECTAL PAIN: 0
STRIDOR: 0
PHOTOPHOBIA: 0
ABDOMINAL PAIN: 1
APNEA: 0
ANAL BLEEDING: 0
EYE ITCHING: 0
CHOKING: 0
EYE REDNESS: 0
COLOR CHANGE: 0
BACK PAIN: 0
SORE THROAT: 0
CONSTIPATION: 0

## 2022-08-02 ENCOUNTER — TELEPHONE (OUTPATIENT)
Dept: SURGERY | Age: 77
End: 2022-08-02

## 2022-08-03 NOTE — TELEPHONE ENCOUNTER
SPOKE TO  [facility] ([address]) SCHEDULED @ [facility].  NOTIFIED OF DATES, TIMES AND LOCATION    PHONE ASSESSMENT   SURGERY - 8/19/22 @ 1030  P/O - 8/29/22 @ 1015    NPO AFTER MIDNIGHT  HOLD BLOOD THINNERS - PLAVIX HOLD 5 DAYS PRIOR, 81MG ASA DO NOT HOLD

## 2022-08-15 DIAGNOSIS — Z01.818 PRE-OPERATIVE CLEARANCE: Primary | ICD-10-CM

## 2022-08-15 NOTE — PROGRESS NOTES
left a message on pt's home and mobile phone informed him he need to complete phone PAT assessment and to go over pre-op instructions. Also informed pt. he would need pre-op testing prior to surgery.  PAT number given

## 2022-08-15 NOTE — PROGRESS NOTES
Surgeon's office Darby Metcalf) called and asked if cardiac clearance would be required. Per history clearance is necessary.

## 2022-08-15 NOTE — PROGRESS NOTES
Called and spoke with Dr. Sandeep Nj office to see if pt needs cardiac clearance prior to surger on 8/19/22. Pt. Is on plavix and has had a heart cath in the past per chart. PAT number given. Samanta with Dr. Sandeep Nj office stated would return call.

## 2022-08-17 ENCOUNTER — ANESTHESIA EVENT (OUTPATIENT)
Dept: OPERATING ROOM | Age: 77
End: 2022-08-17
Payer: MEDICARE

## 2022-08-17 NOTE — PROGRESS NOTES
.Surgery @ [facility] on 8/19/22 patient advised of procedure at 1030 to arrive at 0830               1. Do not eat or drink anything after midnight - unless instructed by your doctor prior to surgery. This includes                   no water, chewing gum or mints. 2. Follow your directions as prescribed by the doctor for your procedure and medications. 3. Check with your Doctor regarding stopping vitamins, supplements, blood thinners (Plavix, Coumadin, Lovenox, Effient, Pradaxa, Xarelto, Fragmin or                   other blood thinners) and follow their instructions. Stop vitamins, supplements and NSAIDS:    4. Do not smoke, vape or use chewing tobacco morning of surgery. Do not drink any alcoholic beverages 24 hours prior to surgery. This includes NA Beer. No street drugs 7 days prior to surgery. 5. You may brush your teeth and gargle the morning of surgery. DO NOT SWALLOW WATER   6. You MUST make arrangements for a responsible adult to take you home after your surgery and be able to check on you every couple                   hours for the day. You will not be allowed to leave alone or drive yourself home. It is strongly suggested someone stay with you the first 24                   hrs. Your surgery will be cancelled if you do not have a ride home. 7. Please wear simple, loose fitting clothing to the hospital.  Kit Mccarthy not bring valuables (money, credit cards, checkbooks, etc.) Do not wear any                   makeup (including no eye makeup) or nail polish on your fingers or toes. 8. DO NOT wear any jewelry or piercings on day of surgery. All body piercing jewelry must be removed. 9. If you have dentures, they will be removed before going to the OR; we will provide you a container. If you wear contact lenses or glasses,                  they will be removed; please bring a case for them.            10. If you  have a Living Will and Durable Power of  for

## 2022-08-18 ENCOUNTER — HOSPITAL ENCOUNTER (OUTPATIENT)
Age: 77
Discharge: HOME | End: 2022-08-18
Payer: MEDICARE

## 2022-08-18 ENCOUNTER — HOSPITAL ENCOUNTER (OUTPATIENT)
Dept: GENERAL RADIOLOGY | Age: 77
Discharge: HOME | End: 2022-08-18
Payer: MEDICARE

## 2022-08-18 DIAGNOSIS — Z01.818 PRE-OP TESTING: ICD-10-CM

## 2022-08-18 LAB
ANION GAP SERPL CALCULATED.3IONS-SCNC: 7 MMOL/L (ref 4–16)
BASOPHILS ABSOLUTE: 0 K/CU MM
BASOPHILS RELATIVE PERCENT: 0.7 % (ref 0–1)
BUN BLDV-MCNC: 15 MG/DL (ref 6–23)
CALCIUM SERPL-MCNC: 9.8 MG/DL (ref 8.3–10.6)
CHLORIDE BLD-SCNC: 104 MMOL/L (ref 99–110)
CO2: 29 MMOL/L (ref 21–32)
CREAT SERPL-MCNC: 0.8 MG/DL (ref 0.9–1.3)
DIFFERENTIAL TYPE: ABNORMAL
EKG ATRIAL RATE: 57 BPM
EKG DIAGNOSIS: NORMAL
EKG P AXIS: 49 DEGREES
EKG P-R INTERVAL: 212 MS
EKG Q-T INTERVAL: 432 MS
EKG QRS DURATION: 104 MS
EKG QTC CALCULATION (BAZETT): 420 MS
EKG R AXIS: -29 DEGREES
EKG T AXIS: -7 DEGREES
EKG VENTRICULAR RATE: 57 BPM
EOSINOPHILS ABSOLUTE: 0.2 K/CU MM
EOSINOPHILS RELATIVE PERCENT: 4.6 % (ref 0–3)
GFR AFRICAN AMERICAN: >60 ML/MIN/1.73M2
GFR NON-AFRICAN AMERICAN: >60 ML/MIN/1.73M2
GLUCOSE BLD-MCNC: 93 MG/DL (ref 70–99)
HCT VFR BLD CALC: 42.8 % (ref 42–52)
HEMOGLOBIN: 13.6 GM/DL (ref 13.5–18)
IMMATURE NEUTROPHIL %: 0.4 % (ref 0–0.43)
LYMPHOCYTES ABSOLUTE: 1.7 K/CU MM
LYMPHOCYTES RELATIVE PERCENT: 37.4 % (ref 24–44)
MCH RBC QN AUTO: 29.6 PG (ref 27–31)
MCHC RBC AUTO-ENTMCNC: 31.8 % (ref 32–36)
MCV RBC AUTO: 93 FL (ref 78–100)
MONOCYTES ABSOLUTE: 0.4 K/CU MM
MONOCYTES RELATIVE PERCENT: 9.5 % (ref 0–4)
NUCLEATED RBC %: 0 %
PDW BLD-RTO: 12.9 % (ref 11.7–14.9)
PLATELET # BLD: 195 K/CU MM (ref 140–440)
PMV BLD AUTO: 10.2 FL (ref 7.5–11.1)
POTASSIUM SERPL-SCNC: 4.5 MMOL/L (ref 3.5–5.1)
RBC # BLD: 4.6 M/CU MM (ref 4.6–6.2)
SEGMENTED NEUTROPHILS ABSOLUTE COUNT: 2.2 K/CU MM
SEGMENTED NEUTROPHILS RELATIVE PERCENT: 47.4 % (ref 36–66)
SODIUM BLD-SCNC: 140 MMOL/L (ref 135–145)
TOTAL IMMATURE NEUTOROPHIL: 0.02 K/CU MM
TOTAL NUCLEATED RBC: 0 K/CU MM
WBC # BLD: 4.5 K/CU MM (ref 4–10.5)

## 2022-08-18 PROCEDURE — 71046 X-RAY EXAM CHEST 2 VIEWS: CPT

## 2022-08-18 PROCEDURE — 85025 COMPLETE CBC W/AUTO DIFF WBC: CPT

## 2022-08-18 PROCEDURE — 93005 ELECTROCARDIOGRAM TRACING: CPT | Performed by: ANESTHESIOLOGY

## 2022-08-18 PROCEDURE — 80048 BASIC METABOLIC PNL TOTAL CA: CPT

## 2022-08-18 PROCEDURE — 93010 ELECTROCARDIOGRAM REPORT: CPT | Performed by: INTERNAL MEDICINE

## 2022-08-18 PROCEDURE — 36415 COLL VENOUS BLD VENIPUNCTURE: CPT

## 2022-08-18 NOTE — ANESTHESIA PRE PROCEDURE
Department of Anesthesiology  Preprocedure Note       Name:  Dane Manley   Age:  77 y.o.  :  1945                                          MRN:  [id number]         Date:  2022      Surgeon: Patrizia Meza):  Glory Friend MD    Procedure: Procedure(s): HERNIA INCISIONAL REPAIR LAPAROSCOPIC ROBOTIC WITH MESH    Medications prior to admission:   Prior to Admission medications    Medication Sig Start Date End Date Taking? Authorizing Provider   carvedilol (COREG) 3.125 MG tablet Take 3.125 mg by mouth 2 times daily    Historical Provider, MD   rosuvastatin (CRESTOR) 20 MG tablet TAKE 1 TABLET BY MOUTH EVERYDAY AT BEDTIME 22   Historical Provider, MD   docusate sodium (COLACE) 100 MG capsule Take 1 capsule by mouth 2 times daily 21   MONIKA Lemon MD   aspirin 81 MG chewable tablet Take 1 tablet by mouth daily 3/7/19   Lance Stewart MD   clopidogrel (PLAVIX) 75 MG tablet Take 1 tablet by mouth daily 3/7/19   Lance Stewart MD       Current medications:    No current facility-administered medications for this encounter. Current Outpatient Medications   Medication Sig Dispense Refill    carvedilol (COREG) 3.125 MG tablet Take 3.125 mg by mouth 2 times daily      rosuvastatin (CRESTOR) 20 MG tablet TAKE 1 TABLET BY MOUTH EVERYDAY AT BEDTIME      docusate sodium (COLACE) 100 MG capsule Take 1 capsule by mouth 2 times daily      aspirin 81 MG chewable tablet Take 1 tablet by mouth daily 90 tablet 0    clopidogrel (PLAVIX) 75 MG tablet Take 1 tablet by mouth daily 90 tablet 0       Allergies:     Allergies   Allergen Reactions    Sulfa Antibiotics      \"Tongue Coats\"       Problem List:    Patient Active Problem List   Diagnosis Code    Chest pain at rest R07.9    DVT of upper extremity (deep vein thrombosis) (HCC) I82.629    Prostate cancer ([facility] C61    Neck pain on left side M54.2    Essential hypertension I10    Hyperlipemia E78.5    Idiopathic parathyroidism ([facility] E20.9    Hyperparathyroidism ([facility] E21.3    Angina pectoris ([facility] I20.9    CAD in native artery I25.10    Chest pain R07.9    Critical illness myopathy G72.81    Generalized weakness R53.1    Uncontrolled pain R52    Small bowel perforation (HCC) K63.1    Acute kidney injury (SUSY) with acute tubular necrosis (ATN) (HCC) N17.0    Paroxysmal atrial fibrillation (HCC) I48.0    Diverticulitis K57.92    History of DVT (deep vein thrombosis) Z86.718    Mild protein-calorie malnutrition (HCC) E44.1    Moderate malnutrition (HCC) E44.0    Syncope due to orthostatic hypotension I95.1    Orthostatic hypotension I95.1       Past Medical History:        Diagnosis Date    Acid reflux     CAD (coronary artery disease)     follows with Ahmed    Diverticulitis Last Episode In 2013    Hx of blood clots Dx 2013    \"Left Arm\"    Hyperlipidemia     Hypertension     Parathyroid adenoma     Prostate Cancer Dx 2016    2 Prostate Biopsies Done, Last Done In 2017    Teeth missing     Upper And Lower    Wears glasses     Wears partial dentures     Upper       Past Surgical History:        Procedure Laterality Date    CARDIAC CATHETERIZATION  2015    COLONOSCOPY  Last Done In 2000's    Polyps Removed In Past    CORONARY STENT PLACEMENT  2018    DENTAL SURGERY      Teeth Extracted In Past    [address]    PARATHYROIDECTOMY Right 09/04/2018    PROSTATE BIOPSY  2016, 2017    \"Prostate Cancer Dx In 2016\"    VASECTOMY  1986       Social History:    Social History     Tobacco Use    Smoking status: Never    Smokeless tobacco: Never   Substance Use Topics    Alcohol use: Yes     Comment: \"A Couple Times A Week\"                                Counseling given: Not Answered      Vital Signs (Current):   Vitals:    08/17/22 1313   Weight: 210 lb (95.3 kg)   Height: 6' 1\" (1.854 m)                                              BP Readings from Last 3 Encounters:   07/28/22 120/82   07/14/22 138/70   01/17/22 125/68       NPO Status:                                                                                 BMI:   Wt Readings from Last 3 Encounters:   07/28/22 209 lb 12.8 oz (95.2 kg)   07/14/22 207 lb 3.2 oz (94 kg)   01/17/22 180 lb (81.6 kg)     Body mass index is 27.71 kg/m². CBC:   Lab Results   Component Value Date/Time    WBC 15.6 01/17/2022 03:53 PM    RBC 3.90 01/17/2022 03:53 PM    HGB 10.1 01/17/2022 03:53 PM    HCT 33.2 01/17/2022 03:53 PM    MCV 85.1 01/17/2022 03:53 PM    RDW 16.1 01/17/2022 03:53 PM     01/17/2022 03:53 PM       CMP:   Lab Results   Component Value Date/Time     01/17/2022 03:53 PM    K 4.4 01/17/2022 03:53 PM    CL 94 01/17/2022 03:53 PM    CO2 23 01/17/2022 03:53 PM    BUN 24 01/17/2022 03:53 PM    CREATININE 1.0 01/17/2022 03:53 PM    GFRAA >60 01/17/2022 03:53 PM    LABGLOM >60 01/17/2022 03:53 PM    GLUCOSE 102 01/17/2022 03:53 PM    PROT 7.8 01/17/2022 03:53 PM    CALCIUM 9.3 01/17/2022 03:53 PM    BILITOT 0.4 01/17/2022 03:53 PM    ALKPHOS 122 01/17/2022 03:53 PM    AST 33 01/17/2022 03:53 PM    ALT 34 01/17/2022 03:53 PM       POC Tests: No results for input(s): POCGLU, POCNA, POCK, POCCL, POCBUN, POCHEMO, POCHCT in the last 72 hours.     Coags:   Lab Results   Component Value Date/Time    PROTIME 16.6 11/09/2021 05:20 PM    INR 1.28 11/09/2021 05:20 PM    APTT 26.5 11/09/2021 05:20 PM       HCG (If Applicable): No results found for: PREGTESTUR, PREGSERUM, HCG, HCGQUANT     ABGs: No results found for: PHART, PO2ART, LJP3RUC, UFC0WMJ, BEART, P0VZQYJO     Type & Screen (If Applicable):  No results found for: LABABO, LABRH    Drug/Infectious Status (If Applicable):  No results found for: HIV, HEPCAB    COVID-19 Screening (If Applicable): No results found for: COVID19        Anesthesia Evaluation  Patient summary reviewed  Airway: Mallampati: II  TM distance: >3 FB   Neck ROM: full  Mouth opening: > = 3 FB   Dental:    (+) partials and upper dentures Pulmonary:normal exam                               Cardiovascular:    (+) hypertension:, CAD:, dysrhythmias: atrial fibrillation, hyperlipidemia      ECG reviewed      Echocardiogram reviewed         Beta Blocker:  Dose within 24 Hrs      ROS comment: Summary   Left ventricular systolic function is normal.   Ejection fraction is visually estimated at 50-55%. Thickening of mitral valve leaflets is noted. No evidence of any pericardial effusion. Right pleural effusion. Dilated aortic root (4.0 cm). Signature      ------------------------------------------------------------------   Electronically signed by Kathy Loyd MD (Interpreting   physician) on 11/10/2021 at 09:27 AM     Neuro/Psych:               GI/Hepatic/Renal:   (+) GERD:,           Endo/Other:    (+) blood dyscrasia: anticoagulation therapy:., malignancy/cancer. Abdominal:             Vascular:   + DVT, . Other Findings:           Anesthesia Plan      general     ASA 3     (Chart review 8/18/22)  Induction: intravenous. MIPS: Postoperative opioids intended. Anesthetic plan and risks discussed with patient. Plan discussed with CRNA.     Attending anesthesiologist reviewed and agrees with Pre Eval content                DAYNA Padilla - CRNA   8/18/2022

## 2022-08-19 ENCOUNTER — HOSPITAL ENCOUNTER (OUTPATIENT)
Age: 77
Setting detail: OBSERVATION
Discharge: HOME | End: 2022-08-21
Attending: SURGERY | Admitting: SURGERY
Payer: MEDICARE

## 2022-08-19 ENCOUNTER — ANESTHESIA (OUTPATIENT)
Dept: OPERATING ROOM | Age: 77
End: 2022-08-19
Payer: MEDICARE

## 2022-08-19 DIAGNOSIS — Z01.818 PRE-OP TESTING: ICD-10-CM

## 2022-08-19 DIAGNOSIS — K43.2 INCISIONAL HERNIA OF ANTERIOR ABDOMINAL WALL WITHOUT OBSTRUCTION OR GANGRENE: Primary | ICD-10-CM

## 2022-08-19 PROCEDURE — APPNB180 APP NON BILLABLE TIME > 60 MINS: Performed by: PHYSICIAN ASSISTANT

## 2022-08-19 PROCEDURE — 3600000019 HC SURGERY ROBOT ADDTL 15MIN: Performed by: SURGERY

## 2022-08-19 PROCEDURE — 49654 PR LAP, INCISIONAL HERNIA REPAIR,REDUCIBLE: CPT | Performed by: SURGERY

## 2022-08-19 PROCEDURE — 7100000001 HC PACU RECOVERY - ADDTL 15 MIN: Performed by: SURGERY

## 2022-08-19 PROCEDURE — 2580000003 HC RX 258: Performed by: ANESTHESIOLOGY

## 2022-08-19 PROCEDURE — 6360000002 HC RX W HCPCS

## 2022-08-19 PROCEDURE — 94150 VITAL CAPACITY TEST: CPT

## 2022-08-19 PROCEDURE — 2709999900 HC NON-CHARGEABLE SUPPLY: Performed by: SURGERY

## 2022-08-19 PROCEDURE — G0378 HOSPITAL OBSERVATION PER HR: HCPCS

## 2022-08-19 PROCEDURE — 3700000001 HC ADD 15 MINUTES (ANESTHESIA): Performed by: SURGERY

## 2022-08-19 PROCEDURE — 94664 DEMO&/EVAL PT USE INHALER: CPT

## 2022-08-19 PROCEDURE — 15734 MUSCLE-SKIN GRAFT TRUNK: CPT | Performed by: PHYSICIAN ASSISTANT

## 2022-08-19 PROCEDURE — 49654 PR LAP, INCISIONAL HERNIA REPAIR,REDUCIBLE: CPT | Performed by: PHYSICIAN ASSISTANT

## 2022-08-19 PROCEDURE — 2580000003 HC RX 258: Performed by: PHYSICIAN ASSISTANT

## 2022-08-19 PROCEDURE — 6370000000 HC RX 637 (ALT 250 FOR IP): Performed by: PHYSICIAN ASSISTANT

## 2022-08-19 PROCEDURE — 6360000002 HC RX W HCPCS: Performed by: ANESTHESIOLOGY

## 2022-08-19 PROCEDURE — C1781 MESH (IMPLANTABLE): HCPCS | Performed by: SURGERY

## 2022-08-19 PROCEDURE — S2900 ROBOTIC SURGICAL SYSTEM: HCPCS | Performed by: SURGERY

## 2022-08-19 PROCEDURE — 7100000000 HC PACU RECOVERY - FIRST 15 MIN: Performed by: SURGERY

## 2022-08-19 PROCEDURE — 2700000000 HC OXYGEN THERAPY PER DAY

## 2022-08-19 PROCEDURE — 15734 MUSCLE-SKIN GRAFT TRUNK: CPT | Performed by: SURGERY

## 2022-08-19 PROCEDURE — 94761 N-INVAS EAR/PLS OXIMETRY MLT: CPT

## 2022-08-19 PROCEDURE — 6360000002 HC RX W HCPCS: Performed by: PHYSICIAN ASSISTANT

## 2022-08-19 PROCEDURE — 3600000009 HC SURGERY ROBOT BASE: Performed by: SURGERY

## 2022-08-19 PROCEDURE — 3700000000 HC ANESTHESIA ATTENDED CARE: Performed by: SURGERY

## 2022-08-19 PROCEDURE — 2500000003 HC RX 250 WO HCPCS: Performed by: SURGERY

## 2022-08-19 PROCEDURE — 2500000003 HC RX 250 WO HCPCS

## 2022-08-19 DEVICE — MESH SURG W30XL30CM DIA5MM POLYPR SQ FLAT FOR SFT TISS REP: Type: IMPLANTABLE DEVICE | Site: ABDOMEN | Status: FUNCTIONAL

## 2022-08-19 RX ORDER — MEPERIDINE HYDROCHLORIDE 25 MG/ML
12.5 INJECTION INTRAMUSCULAR; INTRAVENOUS; SUBCUTANEOUS ONCE
Status: DISCONTINUED | OUTPATIENT
Start: 2022-08-19 | End: 2022-08-19 | Stop reason: HOSPADM

## 2022-08-19 RX ORDER — ONDANSETRON 4 MG/1
4 TABLET, ORALLY DISINTEGRATING ORAL EVERY 8 HOURS PRN
Status: DISCONTINUED | OUTPATIENT
Start: 2022-08-19 | End: 2022-08-21 | Stop reason: HOSPADM

## 2022-08-19 RX ORDER — FENTANYL CITRATE 50 UG/ML
INJECTION, SOLUTION INTRAMUSCULAR; INTRAVENOUS PRN
Status: DISCONTINUED | OUTPATIENT
Start: 2022-08-19 | End: 2022-08-19 | Stop reason: SDUPTHER

## 2022-08-19 RX ORDER — SODIUM CHLORIDE 9 MG/ML
INJECTION, SOLUTION INTRAVENOUS CONTINUOUS
Status: DISCONTINUED | OUTPATIENT
Start: 2022-08-19 | End: 2022-08-21 | Stop reason: HOSPADM

## 2022-08-19 RX ORDER — LORAZEPAM 2 MG/ML
0.5 INJECTION INTRAMUSCULAR
Status: DISCONTINUED | OUTPATIENT
Start: 2022-08-19 | End: 2022-08-19 | Stop reason: HOSPADM

## 2022-08-19 RX ORDER — CARVEDILOL 6.25 MG/1
3.12 TABLET ORAL 2 TIMES DAILY
Status: DISCONTINUED | OUTPATIENT
Start: 2022-08-19 | End: 2022-08-21 | Stop reason: HOSPADM

## 2022-08-19 RX ORDER — SODIUM CHLORIDE 0.9 % (FLUSH) 0.9 %
5-40 SYRINGE (ML) INJECTION PRN
Status: DISCONTINUED | OUTPATIENT
Start: 2022-08-19 | End: 2022-08-19 | Stop reason: HOSPADM

## 2022-08-19 RX ORDER — ROCURONIUM BROMIDE 10 MG/ML
INJECTION, SOLUTION INTRAVENOUS PRN
Status: DISCONTINUED | OUTPATIENT
Start: 2022-08-19 | End: 2022-08-19 | Stop reason: SDUPTHER

## 2022-08-19 RX ORDER — CYCLOBENZAPRINE HCL 10 MG
10 TABLET ORAL 3 TIMES DAILY PRN
Status: DISCONTINUED | OUTPATIENT
Start: 2022-08-19 | End: 2022-08-21 | Stop reason: HOSPADM

## 2022-08-19 RX ORDER — OXYCODONE HYDROCHLORIDE 5 MG/1
5 TABLET ORAL
Status: DISCONTINUED | OUTPATIENT
Start: 2022-08-19 | End: 2022-08-19 | Stop reason: HOSPADM

## 2022-08-19 RX ORDER — SODIUM CHLORIDE 0.9 % (FLUSH) 0.9 %
5-40 SYRINGE (ML) INJECTION PRN
Status: DISCONTINUED | OUTPATIENT
Start: 2022-08-19 | End: 2022-08-21 | Stop reason: HOSPADM

## 2022-08-19 RX ORDER — SODIUM CHLORIDE, SODIUM LACTATE, POTASSIUM CHLORIDE, CALCIUM CHLORIDE 600; 310; 30; 20 MG/100ML; MG/100ML; MG/100ML; MG/100ML
INJECTION, SOLUTION INTRAVENOUS CONTINUOUS
Status: DISCONTINUED | OUTPATIENT
Start: 2022-08-19 | End: 2022-08-19 | Stop reason: HOSPADM

## 2022-08-19 RX ORDER — SODIUM CHLORIDE, SODIUM LACTATE, POTASSIUM CHLORIDE, CALCIUM CHLORIDE 600; 310; 30; 20 MG/100ML; MG/100ML; MG/100ML; MG/100ML
INJECTION, SOLUTION INTRAVENOUS CONTINUOUS
Status: DISCONTINUED | OUTPATIENT
Start: 2022-08-19 | End: 2022-08-19

## 2022-08-19 RX ORDER — SODIUM CHLORIDE 9 MG/ML
INJECTION, SOLUTION INTRAVENOUS PRN
Status: DISCONTINUED | OUTPATIENT
Start: 2022-08-19 | End: 2022-08-19 | Stop reason: HOSPADM

## 2022-08-19 RX ORDER — ONDANSETRON 2 MG/ML
4 INJECTION INTRAMUSCULAR; INTRAVENOUS EVERY 6 HOURS PRN
Status: DISCONTINUED | OUTPATIENT
Start: 2022-08-19 | End: 2022-08-21 | Stop reason: HOSPADM

## 2022-08-19 RX ORDER — SODIUM CHLORIDE 9 MG/ML
25 INJECTION, SOLUTION INTRAVENOUS PRN
Status: DISCONTINUED | OUTPATIENT
Start: 2022-08-19 | End: 2022-08-19 | Stop reason: HOSPADM

## 2022-08-19 RX ORDER — HYDRALAZINE HYDROCHLORIDE 20 MG/ML
10 INJECTION INTRAMUSCULAR; INTRAVENOUS
Status: DISCONTINUED | OUTPATIENT
Start: 2022-08-19 | End: 2022-08-19 | Stop reason: HOSPADM

## 2022-08-19 RX ORDER — PROCHLORPERAZINE EDISYLATE 5 MG/ML
5 INJECTION INTRAMUSCULAR; INTRAVENOUS
Status: DISCONTINUED | OUTPATIENT
Start: 2022-08-19 | End: 2022-08-19 | Stop reason: HOSPADM

## 2022-08-19 RX ORDER — OXYCODONE HYDROCHLORIDE 5 MG/1
5 TABLET ORAL EVERY 4 HOURS PRN
Status: DISCONTINUED | OUTPATIENT
Start: 2022-08-19 | End: 2022-08-21 | Stop reason: HOSPADM

## 2022-08-19 RX ORDER — BUPIVACAINE HYDROCHLORIDE AND EPINEPHRINE 5; 5 MG/ML; UG/ML
INJECTION, SOLUTION EPIDURAL; INTRACAUDAL; PERINEURAL
Status: COMPLETED | OUTPATIENT
Start: 2022-08-19 | End: 2022-08-19

## 2022-08-19 RX ORDER — SODIUM CHLORIDE 9 MG/ML
INJECTION, SOLUTION INTRAVENOUS PRN
Status: DISCONTINUED | OUTPATIENT
Start: 2022-08-19 | End: 2022-08-21 | Stop reason: HOSPADM

## 2022-08-19 RX ORDER — DIPHENHYDRAMINE HYDROCHLORIDE 50 MG/ML
12.5 INJECTION INTRAMUSCULAR; INTRAVENOUS
Status: DISCONTINUED | OUTPATIENT
Start: 2022-08-19 | End: 2022-08-19 | Stop reason: HOSPADM

## 2022-08-19 RX ORDER — SODIUM CHLORIDE 0.9 % (FLUSH) 0.9 %
5-40 SYRINGE (ML) INJECTION EVERY 12 HOURS SCHEDULED
Status: DISCONTINUED | OUTPATIENT
Start: 2022-08-19 | End: 2022-08-19 | Stop reason: HOSPADM

## 2022-08-19 RX ORDER — FENTANYL CITRATE 50 UG/ML
50 INJECTION, SOLUTION INTRAMUSCULAR; INTRAVENOUS EVERY 5 MIN PRN
Status: DISCONTINUED | OUTPATIENT
Start: 2022-08-19 | End: 2022-08-19 | Stop reason: HOSPADM

## 2022-08-19 RX ORDER — DEXTROSE MONOHYDRATE 100 MG/ML
INJECTION, SOLUTION INTRAVENOUS CONTINUOUS PRN
Status: DISCONTINUED | OUTPATIENT
Start: 2022-08-19 | End: 2022-08-19 | Stop reason: HOSPADM

## 2022-08-19 RX ORDER — PROPOFOL 10 MG/ML
INJECTION, EMULSION INTRAVENOUS PRN
Status: DISCONTINUED | OUTPATIENT
Start: 2022-08-19 | End: 2022-08-19 | Stop reason: SDUPTHER

## 2022-08-19 RX ORDER — ENOXAPARIN SODIUM 100 MG/ML
40 INJECTION SUBCUTANEOUS DAILY
Status: DISCONTINUED | OUTPATIENT
Start: 2022-08-20 | End: 2022-08-21 | Stop reason: HOSPADM

## 2022-08-19 RX ORDER — ONDANSETRON 2 MG/ML
4 INJECTION INTRAMUSCULAR; INTRAVENOUS
Status: DISCONTINUED | OUTPATIENT
Start: 2022-08-19 | End: 2022-08-19 | Stop reason: HOSPADM

## 2022-08-19 RX ORDER — DEXAMETHASONE SODIUM PHOSPHATE 4 MG/ML
INJECTION, SOLUTION INTRA-ARTICULAR; INTRALESIONAL; INTRAMUSCULAR; INTRAVENOUS; SOFT TISSUE PRN
Status: DISCONTINUED | OUTPATIENT
Start: 2022-08-19 | End: 2022-08-19 | Stop reason: SDUPTHER

## 2022-08-19 RX ORDER — SODIUM CHLORIDE 0.9 % (FLUSH) 0.9 %
5-40 SYRINGE (ML) INJECTION EVERY 12 HOURS SCHEDULED
Status: DISCONTINUED | OUTPATIENT
Start: 2022-08-19 | End: 2022-08-21 | Stop reason: HOSPADM

## 2022-08-19 RX ORDER — IPRATROPIUM BROMIDE AND ALBUTEROL SULFATE 2.5; .5 MG/3ML; MG/3ML
1 SOLUTION RESPIRATORY (INHALATION)
Status: DISCONTINUED | OUTPATIENT
Start: 2022-08-19 | End: 2022-08-19 | Stop reason: HOSPADM

## 2022-08-19 RX ORDER — LIDOCAINE HYDROCHLORIDE 20 MG/ML
INJECTION, SOLUTION INTRAVENOUS PRN
Status: DISCONTINUED | OUTPATIENT
Start: 2022-08-19 | End: 2022-08-19 | Stop reason: SDUPTHER

## 2022-08-19 RX ADMIN — CEFAZOLIN 2000 MG: 2 INJECTION, POWDER, FOR SOLUTION INTRAMUSCULAR; INTRAVENOUS at 10:44

## 2022-08-19 RX ADMIN — SODIUM CHLORIDE, POTASSIUM CHLORIDE, SODIUM LACTATE AND CALCIUM CHLORIDE: 600; 310; 30; 20 INJECTION, SOLUTION INTRAVENOUS at 12:11

## 2022-08-19 RX ADMIN — FENTANYL CITRATE 50 MCG: 50 INJECTION, SOLUTION INTRAMUSCULAR; INTRAVENOUS at 10:38

## 2022-08-19 RX ADMIN — CARVEDILOL 3.12 MG: 6.25 TABLET, FILM COATED ORAL at 20:02

## 2022-08-19 RX ADMIN — LIDOCAINE HYDROCHLORIDE 60 MG: 20 INJECTION, SOLUTION INTRAVENOUS at 10:38

## 2022-08-19 RX ADMIN — SODIUM CHLORIDE, POTASSIUM CHLORIDE, SODIUM LACTATE AND CALCIUM CHLORIDE: 600; 310; 30; 20 INJECTION, SOLUTION INTRAVENOUS at 09:49

## 2022-08-19 RX ADMIN — CEFAZOLIN 2000 MG: 2 INJECTION, POWDER, FOR SOLUTION INTRAMUSCULAR; INTRAVENOUS at 10:27

## 2022-08-19 RX ADMIN — ROCURONIUM BROMIDE 50 MG: 10 SOLUTION INTRAVENOUS at 11:35

## 2022-08-19 RX ADMIN — PROPOFOL 200 MG: 10 INJECTION, EMULSION INTRAVENOUS at 10:38

## 2022-08-19 RX ADMIN — DEXAMETHASONE SODIUM PHOSPHATE 4 MG: 4 INJECTION, SOLUTION INTRAMUSCULAR; INTRAVENOUS at 10:49

## 2022-08-19 RX ADMIN — SODIUM CHLORIDE: 9 INJECTION, SOLUTION INTRAVENOUS at 16:18

## 2022-08-19 RX ADMIN — SODIUM CHLORIDE, POTASSIUM CHLORIDE, SODIUM LACTATE AND CALCIUM CHLORIDE: 600; 310; 30; 20 INJECTION, SOLUTION INTRAVENOUS at 13:29

## 2022-08-19 RX ADMIN — ROCURONIUM BROMIDE 50 MG: 10 SOLUTION INTRAVENOUS at 10:38

## 2022-08-19 RX ADMIN — OXYCODONE HYDROCHLORIDE 5 MG: 5 TABLET ORAL at 16:08

## 2022-08-19 RX ADMIN — HYDROMORPHONE HYDROCHLORIDE 0.5 MG: 1 INJECTION, SOLUTION INTRAMUSCULAR; INTRAVENOUS; SUBCUTANEOUS at 10:59

## 2022-08-19 RX ADMIN — HYDROMORPHONE HYDROCHLORIDE 0.5 MG: 1 INJECTION, SOLUTION INTRAMUSCULAR; INTRAVENOUS; SUBCUTANEOUS at 12:03

## 2022-08-19 RX ADMIN — SODIUM CHLORIDE, POTASSIUM CHLORIDE, SODIUM LACTATE AND CALCIUM CHLORIDE: 600; 310; 30; 20 INJECTION, SOLUTION INTRAVENOUS at 15:27

## 2022-08-19 RX ADMIN — FENTANYL CITRATE 50 MCG: 50 INJECTION, SOLUTION INTRAMUSCULAR; INTRAVENOUS at 10:45

## 2022-08-19 RX ADMIN — HYDROMORPHONE HYDROCHLORIDE 0.25 MG: 1 INJECTION, SOLUTION INTRAMUSCULAR; INTRAVENOUS; SUBCUTANEOUS at 13:32

## 2022-08-19 ASSESSMENT — PAIN SCALES - GENERAL
PAINLEVEL_OUTOF10: 0
PAINLEVEL_OUTOF10: 3
PAINLEVEL_OUTOF10: 0
PAINLEVEL_OUTOF10: 5
PAINLEVEL_OUTOF10: 2
PAINLEVEL_OUTOF10: 2

## 2022-08-19 ASSESSMENT — PAIN DESCRIPTION - DESCRIPTORS
DESCRIPTORS: BURNING;DISCOMFORT
DESCRIPTORS: DISCOMFORT;SORE

## 2022-08-19 ASSESSMENT — PAIN DESCRIPTION - PAIN TYPE: TYPE: SURGICAL PAIN

## 2022-08-19 ASSESSMENT — PAIN DESCRIPTION - LOCATION
LOCATION: ABDOMEN

## 2022-08-19 ASSESSMENT — PAIN DESCRIPTION - DIRECTION: RADIATING_TOWARDS: MIDLINE

## 2022-08-19 ASSESSMENT — PAIN - FUNCTIONAL ASSESSMENT: PAIN_FUNCTIONAL_ASSESSMENT: ACTIVITIES ARE NOT PREVENTED

## 2022-08-19 ASSESSMENT — PAIN DESCRIPTION - FREQUENCY: FREQUENCY: CONTINUOUS

## 2022-08-19 ASSESSMENT — PAIN DESCRIPTION - ORIENTATION: ORIENTATION: MID

## 2022-08-19 ASSESSMENT — PAIN DESCRIPTION - ONSET: ONSET: GRADUAL

## 2022-08-19 NOTE — H&P
General Surgery - H&P  Dr. Khris Brown PA-C      The patient was seen and examined. There have been no changes to the H&P since the patient was seen in the office on 7/28/22. We will proceed with Robotic assisted incisional hernia repair. The patient was counseled at length about the risks of adam Covid-19 during their perioperative period and any recovery window from their procedure. The patient was made aware that adam Covid-19  may worsen their prognosis for recovering from their procedure  and lend to a higher morbidity and/or mortality risk. All material risks, benefits, and reasonable alternatives including postponing the procedure were discussed. The patient does wish to proceed with the procedure at this time.       Maicol Reeves PA-C

## 2022-08-19 NOTE — BRIEF OP NOTE
Brief Postoperative Note      Patient: Silvestre Guaman  YOB: 1945  MRN: [id number]    Date of Procedure: 8/19/2022    Pre-Op Diagnosis: Ventral hernia without obstruction or gangrene [K43.9]    Post-Op Diagnosis: Same       Procedure(s): HERNIA INCISIONAL REPAIR LAPAROSCOPIC ROBOTIC WITH MESH WITH COMPONENT SEPARATION    Surgeon(s):  Annia Andrade MD    Assistant:  First Assistant: Maicol Reeves PA-C    Anesthesia: General    Estimated Blood Loss (mL): 860MF    Complications: None    Specimens:   * No specimens in log *    Implants:  Implant Name Type Inv.  Item Serial No.  Lot No. LRB No. Used Action   MESH SURG [id number] DIA5MM POLYPR SQ FLAT FOR SFT TISS REP - [id number]  MESH SURG [id number] DIA5MM POLYPR SQ FLAT FOR SFT TISS REP  [employer] N/A 1 Implanted         Drains:   NG/OG/NJ/NE Tube Orogastric 18 fr Right mouth (Active)       Findings: As above    Electronically signed by Maicol Reeves PA-C on 8/19/2022 at 1:14 PM

## 2022-08-19 NOTE — PROGRESS NOTES
Report called to Zander Perez on [address]. Room is still being cleaned. She will call me when room is ready. Pt. Resting comfortably. Will update family/visitors in waiting area.

## 2022-08-19 NOTE — PROGRESS NOTES
Called [address] to get an update on the room status. Room is still dirty. Environmental has not started to clean the room yet. Nurse will update me when room is clean. Wife was updated as well. She is going to run home and will return. Pt. Denies nausea. C/o tolerable soreness. He is able to rest. Tolerating ice water. No other needs voiced at this time. Will continue to monitor.

## 2022-08-19 NOTE — PROGRESS NOTES
Patient arrives from PACU. Skin assessment complete with Ren Oro LPN. Skin is warm and dry. There are no pressure areas or open wounds noted. Abdominal surgical sites are clean, dry and intact- glue is open to air. Placed abdominal binder on patient at this time. Patient displays good bed mobility and reports he walks independently at home. Will continue to assess and monitor.

## 2022-08-19 NOTE — ANESTHESIA POSTPROCEDURE EVALUATION
Department of Anesthesiology  Postprocedure Note    Patient: Natalia Perez  MRN: [id number]  YOB: 1945  Date of evaluation: 8/19/2022      Procedure Summary     Date: 08/19/22 Room / Location: [address] [place] [address]    Anesthesia Start: 9829 Anesthesia Stop: 5101    Procedure:  HERNIA INCISIONAL REPAIR LAPAROSCOPIC ROBOTIC WITH MESH WITH COMPONENT SEPARATION (Abdomen) Diagnosis:       Ventral hernia without obstruction or gangrene      (Ventral hernia without obstruction or gangrene [K43.9])    Surgeons: Chhaya Suero MD Responsible Provider: Zuleika Mathur MD    Anesthesia Type: General ASA Status: 3          Anesthesia Type: General    Frantz Phase I:      Frantz Phase II:        Anesthesia Post Evaluation    Patient location during evaluation: PACU  Patient participation: complete - patient participated  Level of consciousness: awake and alert  Airway patency: patent  Nausea & Vomiting: no nausea and no vomiting  Complications: no  Cardiovascular status: hemodynamically stable  Respiratory status: face mask and spontaneous ventilation  Hydration status: stable

## 2022-08-20 PROCEDURE — APPNB60 APP NON BILLABLE TIME 46-60 MINS: Performed by: PHYSICIAN ASSISTANT

## 2022-08-20 PROCEDURE — 6360000002 HC RX W HCPCS: Performed by: PHYSICIAN ASSISTANT

## 2022-08-20 PROCEDURE — 99024 POSTOP FOLLOW-UP VISIT: CPT | Performed by: PHYSICIAN ASSISTANT

## 2022-08-20 PROCEDURE — 96372 THER/PROPH/DIAG INJ SC/IM: CPT

## 2022-08-20 PROCEDURE — G0378 HOSPITAL OBSERVATION PER HR: HCPCS

## 2022-08-20 PROCEDURE — 6370000000 HC RX 637 (ALT 250 FOR IP): Performed by: PHYSICIAN ASSISTANT

## 2022-08-20 PROCEDURE — 94761 N-INVAS EAR/PLS OXIMETRY MLT: CPT

## 2022-08-20 PROCEDURE — 2580000003 HC RX 258: Performed by: PHYSICIAN ASSISTANT

## 2022-08-20 PROCEDURE — 96374 THER/PROPH/DIAG INJ IV PUSH: CPT

## 2022-08-20 RX ORDER — OXYCODONE HYDROCHLORIDE 5 MG/1
5 TABLET ORAL EVERY 6 HOURS PRN
Qty: 28 TABLET | Refills: 0 | Status: SHIPPED | OUTPATIENT
Start: 2022-08-20 | End: 2022-08-27

## 2022-08-20 RX ORDER — CYCLOBENZAPRINE HCL 10 MG
10 TABLET ORAL 3 TIMES DAILY PRN
Qty: 30 TABLET | Refills: 0 | Status: SHIPPED | OUTPATIENT
Start: 2022-08-20 | End: 2022-08-30

## 2022-08-20 RX ADMIN — CARVEDILOL 3.12 MG: 6.25 TABLET, FILM COATED ORAL at 20:55

## 2022-08-20 RX ADMIN — CYCLOBENZAPRINE 10 MG: 10 TABLET, FILM COATED ORAL at 11:48

## 2022-08-20 RX ADMIN — ENOXAPARIN SODIUM 40 MG: 100 INJECTION SUBCUTANEOUS at 10:52

## 2022-08-20 RX ADMIN — OXYCODONE HYDROCHLORIDE 5 MG: 5 TABLET ORAL at 16:08

## 2022-08-20 RX ADMIN — SODIUM CHLORIDE: 9 INJECTION, SOLUTION INTRAVENOUS at 04:56

## 2022-08-20 RX ADMIN — SODIUM CHLORIDE, PRESERVATIVE FREE 10 ML: 5 INJECTION INTRAVENOUS at 20:55

## 2022-08-20 RX ADMIN — CARVEDILOL 3.12 MG: 6.25 TABLET, FILM COATED ORAL at 10:52

## 2022-08-20 RX ADMIN — OXYCODONE HYDROCHLORIDE 5 MG: 5 TABLET ORAL at 10:52

## 2022-08-20 RX ADMIN — OXYCODONE HYDROCHLORIDE 5 MG: 5 TABLET ORAL at 22:26

## 2022-08-20 RX ADMIN — ONDANSETRON 4 MG: 2 INJECTION INTRAMUSCULAR; INTRAVENOUS at 20:55

## 2022-08-20 ASSESSMENT — PAIN DESCRIPTION - LOCATION
LOCATION: ABDOMEN

## 2022-08-20 ASSESSMENT — PAIN SCALES - GENERAL
PAINLEVEL_OUTOF10: 6
PAINLEVEL_OUTOF10: 7
PAINLEVEL_OUTOF10: 6
PAINLEVEL_OUTOF10: 4
PAINLEVEL_OUTOF10: 4
PAINLEVEL_OUTOF10: 0
PAINLEVEL_OUTOF10: 6
PAINLEVEL_OUTOF10: 0

## 2022-08-20 ASSESSMENT — PAIN DESCRIPTION - ORIENTATION
ORIENTATION: MID

## 2022-08-20 ASSESSMENT — PAIN - FUNCTIONAL ASSESSMENT: PAIN_FUNCTIONAL_ASSESSMENT: ACTIVITIES ARE NOT PREVENTED

## 2022-08-20 ASSESSMENT — PAIN DESCRIPTION - PAIN TYPE: TYPE: SURGICAL PAIN

## 2022-08-20 ASSESSMENT — PAIN DESCRIPTION - DESCRIPTORS
DESCRIPTORS: STABBING
DESCRIPTORS: BURNING;STABBING
DESCRIPTORS: BURNING;STABBING
DESCRIPTORS: ACHING

## 2022-08-20 ASSESSMENT — PAIN DESCRIPTION - FREQUENCY: FREQUENCY: CONTINUOUS

## 2022-08-20 ASSESSMENT — PAIN DESCRIPTION - ONSET: ONSET: ON-GOING

## 2022-08-20 NOTE — DISCHARGE INSTRUCTIONS
Discharge Instructions for Abdominal Hernia   Dr Raul Bermudez PA-C  [phone] ACTIVITY:      BATHING: OK to shower but no bath tub or submerging incision under water. You may shower beginning the second day after surgery. Wound:  Keep wound dry and clean. May shower as instructed above. Dressing:  If you have a dressing over your belly button: You may remove your surgical dressing 2 days after surgery before you shower. Your incision is covered with glue that will fall off on its own with time. DRIVING: No driving until off narcotic pain medications and walking comfortably    RETURN TO WORK: When cleared by your surgeon    WALKING:  As tolerated     STAIRS:  As tolerated    Diet    Some pain medicine can cause constipation . To avoid this problem:   Drink plenty of fluids. Eat foods high in fiber , such as:   Whole grain cereals and breads   Fruits and vegetables   Legumes (eg, beans, lentils)   Physical Activity    Less than 10 pounds until cleared by your surgeon  Ask the doctor when you can return to normal activities. Wear your abdominal binder as needed for comfort      In addition:   Ask the doctor when you will be able to return to work. Do not drive unless your doctor has given you permission to do so. Medications     Take your pain medications as instructed. Resume your home medications as instructed. Lifestyle Changes   You and your doctor will plan lifestyle changes that will aid in recovery. If you smoke, your doctor may recommend that you quit . Smoking can cause chronic cough , a risk factor for this condition. Prevention   To prevent hernias from recurring:   Maintain a healthy weight . Strengthen abdominal muscles. Avoid heavy lifting. Get treated for chronic conditions, like constipation, allergies, or chronic coughing. Follow-up   The doctor will monitor this condition. You may need more exams. Go to all of your appointments. Call Dr Stefani Quesada at [phone]  If Any of the Following Occurs   After you leave the hospital, call your doctor if any of the following occurs:   Pain that worsens   Other new symptoms   Signs of infection, including fever and chills   Nausea and/or vomiting that you can't control with the medications you were given   Pain that you can't control with the medications you've been given   Pain, burning, urgency or frequency of urination, or persistent bleeding in the urine   Excessive tenderness or swelling   Changes in bowel or sexual function   Dizziness or lightheadedness   Rash or hives   Call [phone] or go to the emergency room immediately if any of the following occurs:   Cough, shortness of breath, or chest pain   Rapid, irregular heartbeat; chest pain   If you think you have an emergency

## 2022-08-20 NOTE — DISCHARGE SUMMARY
Physician Discharge Summary     Patient ID:  Gray Hill  [id number]  77 y.o.  1945    Admit date: 8/19/2022    Discharge date: 08/20/22    Admitting Physician: Anabella Burkett MD     Discharge Physician:same    Admission Diagnoses: Ventral hernia without obstruction or gangrene [K43.9]  Incisional hernia of anterior abdominal wall without obstruction or gangrene [K43.2]    Discharge Diagnoses: same    Admission Condition:good    Discharged Condition: Good    Indication for Admission: post op pain control    Hospital Course:  Patient had an uneventful hospital course. Patient was able to tolerate diet, ambulate and have regular bowel function present discharge. Consults: none    Outstanding Order Results       No orders found for last 30 day(s). Treatments: surgery: Robotic assisted incisional hernia repair with component separation    Discharge Exam:  Physical Exam  Constitutional:       Appearance: He is well-developed. HENT:      Head: Normocephalic. Eyes:      Pupils: Pupils are equal, round, and reactive to light. Cardiovascular:      Rate and Rhythm: Normal rate. Pulmonary:      Effort: Pulmonary effort is normal.   Abdominal:      General: There is no distension. Palpations: Abdomen is soft. There is no mass. Tenderness: There is abdominal tenderness. There is no guarding or rebound. Comments: Lap incisions well approximated with glue intact   Musculoskeletal:         General: Normal range of motion. Cervical back: Normal range of motion and neck supple. Skin:     General: Skin is warm. Neurological:      Mental Status: He is alert and oriented to person, place, and time.        Disposition: home    Patient Instructions:      Medication List        START taking these medications      cyclobenzaprine 10 MG tablet  Commonly known as: FLEXERIL  Take 1 tablet by mouth 3 times daily as needed for Muscle spasms     oxyCODONE 5 MG immediate release tablet  Commonly known as: ROXICODONE  Take 1 tablet by mouth every 6 hours as needed for Pain for up to 7 days. CONTINUE taking these medications      aspirin 81 MG chewable tablet  Take 1 tablet by mouth daily     carvedilol 3.125 MG tablet  Commonly known as: COREG     rosuvastatin 20 MG tablet  Commonly known as: CRESTOR            STOP taking these medications      clopidogrel 75 MG tablet  Commonly known as: PLAVIX            ASK your doctor about these medications      docusate sodium 100 MG capsule  Commonly known as: COLACE  Take 1 capsule by mouth 2 times daily               Where to Get Your Medications        These medications were sent to [address] [place] [address]. - [phone] - F [phone]  [address] [place] [address]      Phone: [phone]   cyclobenzaprine 10 MG tablet  oxyCODONE 5 MG immediate release tablet         Activity: activity as tolerated and no lifting, Driving, or Strenuous exercise for 2 weeks    Diet: regular diet    Wound Care: keep wound clean and dry    Follow-up with Dr Steven Matthews or Rody Mena PA-C by calling [phone]. The Office staff will determine follow up time per protocol.     Signed:  Rody Mena PA-C

## 2022-08-20 NOTE — PROGRESS NOTES
Patient with increased pain. Had not been taking pain meds over night, and had significant pain with ambulation that was not well controlled. Pt requests to stay overnight to get better pain control. Will d/c discharge orders for today. Tentative d/c tomorrow.        Maria Antonia Duarte PA-C

## 2022-08-21 VITALS
TEMPERATURE: 98.1 F | BODY MASS INDEX: 28.89 KG/M2 | RESPIRATION RATE: 16 BRPM | HEART RATE: 73 BPM | HEIGHT: 73 IN | DIASTOLIC BLOOD PRESSURE: 69 MMHG | OXYGEN SATURATION: 96 % | WEIGHT: 218 LBS | SYSTOLIC BLOOD PRESSURE: 107 MMHG

## 2022-08-21 PROCEDURE — 2580000003 HC RX 258: Performed by: PHYSICIAN ASSISTANT

## 2022-08-21 PROCEDURE — 6370000000 HC RX 637 (ALT 250 FOR IP): Performed by: PHYSICIAN ASSISTANT

## 2022-08-21 PROCEDURE — G0378 HOSPITAL OBSERVATION PER HR: HCPCS

## 2022-08-21 PROCEDURE — 96372 THER/PROPH/DIAG INJ SC/IM: CPT

## 2022-08-21 PROCEDURE — 99024 POSTOP FOLLOW-UP VISIT: CPT | Performed by: PHYSICIAN ASSISTANT

## 2022-08-21 PROCEDURE — 94761 N-INVAS EAR/PLS OXIMETRY MLT: CPT

## 2022-08-21 PROCEDURE — 6360000002 HC RX W HCPCS: Performed by: PHYSICIAN ASSISTANT

## 2022-08-21 PROCEDURE — APPNB60 APP NON BILLABLE TIME 46-60 MINS: Performed by: PHYSICIAN ASSISTANT

## 2022-08-21 RX ADMIN — CARVEDILOL 3.12 MG: 6.25 TABLET, FILM COATED ORAL at 08:48

## 2022-08-21 RX ADMIN — ENOXAPARIN SODIUM 40 MG: 100 INJECTION SUBCUTANEOUS at 08:48

## 2022-08-21 RX ADMIN — OXYCODONE HYDROCHLORIDE 5 MG: 5 TABLET ORAL at 10:31

## 2022-08-21 RX ADMIN — SODIUM CHLORIDE, PRESERVATIVE FREE 10 ML: 5 INJECTION INTRAVENOUS at 08:48

## 2022-08-21 ASSESSMENT — PAIN DESCRIPTION - DESCRIPTORS: DESCRIPTORS: ACHING;CRAMPING

## 2022-08-21 ASSESSMENT — PAIN DESCRIPTION - ORIENTATION: ORIENTATION: MID

## 2022-08-21 ASSESSMENT — PAIN SCALES - GENERAL: PAINLEVEL_OUTOF10: 4

## 2022-08-21 ASSESSMENT — PAIN DESCRIPTION - LOCATION: LOCATION: ABDOMEN

## 2022-08-21 NOTE — DISCHARGE SUMMARY
Physician Discharge Summary     Patient ID:  Teresa Zamora  [id number]  77 y.o.  1945    Admit date: 8/19/2022    Discharge date: 08/21/22    Admitting Physician: Bernabe Nichole MD     Discharge Physician:same    Admission Diagnoses: Ventral hernia without obstruction or gangrene [K43.9]  Incisional hernia of anterior abdominal wall without obstruction or gangrene [K43.2]    Discharge Diagnoses: same    Admission Condition:good    Discharged Condition: Good    Indication for Admission: post-op pain control s/p Hernia repair    Hospital Course:  Patient had an uneventful hospital course. Patient was able to tolerate diet, ambulate and have regular bowel function present discharge. Pt with pain control issues post-op day 1 - Improved today, and ready to d/c home. Consults: none    Outstanding Order Results       No orders found from 7/21/2022 to 8/20/2022. Treatments: surgery: Robotic assisted incisional hernia repair with component separation. Discharge Exam:  Physical Exam  Constitutional:       Appearance: He is well-developed. HENT:      Head: Normocephalic. Eyes:      Pupils: Pupils are equal, round, and reactive to light. Cardiovascular:      Rate and Rhythm: Normal rate. Pulmonary:      Effort: Pulmonary effort is normal.   Abdominal:      General: There is no distension. Palpations: Abdomen is soft. There is no mass. Tenderness: There is abdominal tenderness. There is no guarding or rebound. Comments: Lap incisions well healed   Musculoskeletal:         General: Normal range of motion. Cervical back: Normal range of motion and neck supple. Skin:     General: Skin is warm. Neurological:      Mental Status: He is alert and oriented to person, place, and time.        Disposition: home    Patient Instructions:      Medication List        START taking these medications      cyclobenzaprine 10 MG tablet  Commonly known as: FLEXERIL  Take 1 tablet by mouth 3 times daily as needed for Muscle spasms     oxyCODONE 5 MG immediate release tablet  Commonly known as: ROXICODONE  Take 1 tablet by mouth every 6 hours as needed for Pain for up to 7 days. CONTINUE taking these medications      aspirin 81 MG chewable tablet  Take 1 tablet by mouth daily     carvedilol 3.125 MG tablet  Commonly known as: COREG     docusate sodium 100 MG capsule  Commonly known as: COLACE  Take 1 capsule by mouth 2 times daily     rosuvastatin 20 MG tablet  Commonly known as: CRESTOR            STOP taking these medications      clopidogrel 75 MG tablet  Commonly known as: PLAVIX               Where to Get Your Medications        These medications were sent to [address] [place] [address]. - [phone] - F [phone]  [address] [place] [address]      Phone: [phone]   cyclobenzaprine 10 MG tablet  oxyCODONE 5 MG immediate release tablet         Activity: activity as tolerated and no lifting, Driving, or Strenuous exercise for 2 weeks    Diet: regular diet    Wound Care: keep wound clean and dry    Follow-up with Dr Ashli Estes or Jose Cruz Sanches PA-C by calling [phone]. The Office staff will determine follow up time per protocol.     Signed:  Jose Cruz Sanches PA-C

## 2022-08-21 NOTE — OP NOTE
Operative Note    Patient ID:  Maciej Sarmiento  [id number]  77 y.o.  1945      Pre-operative Diagnosis: Complex incisional hernia    Post-operative Diagnosis: same    Procedure: 1. Robotic/laparoscopic assisted Ventral hernia repair with mesh    2. Bilateral posterior rectus sheath release (PSR)    3. Bilateral Transversus Abdominis Muscle Release (TAR)    Surgeon: Yury Mar MD    First Assistant: Rena Del Cid PA-C  The use of a first assistant was necessary for the proper positioning, prepping, and draping of the patient, intraoperative retraction, passing sutures and implants(like mesh), stapling bowel and vessels using  devises when necessary, and suction using laparoscopic instruments, exchanging DaVinci robotic instruments, passing sutures and closure of skin and subcutaneous tissues. Findings:  same    Estimated Blood Loss:  Minimal           Total IV Fluids: 1000 ml            Complications:  None; patient tolerated the procedure well. Disposition: PACU - hemodynamically stable. Condition: stable        Indication: This is a 71-year-old patient with complex incisional hernia. Patient's past surgical history includes robotic prostatectomy complicated with postoperative bowel injury with ex lap and bowel resection. Surgery was in 2021. Patient subsequently developed incisional hernia which has been getting bigger over the past few months. CT was used as a preop evaluation. Procedure Details: The patient was seen again in the Holding Room. The risks, benefits, complications, treatment options, and expected outcomes were discussed with the patient. The possibilities of reaction to medication, pulmonary aspiration, perforation of viscus, bleeding, recurrent infection, the need for additional procedures, and development of a complication requiring transfusion or further operation were discussed with the patient and/or family.  There was concurrence with the proposed plan, and informed consent was obtained. The site of surgery was properly noted/marked. The patient was taken to the Operating Room, identified as Shannan Nipple, and the procedure verified. A Time Out was held and the above information confirmed. The patient was brought into the operating room and placed supine. The bilateral arm was tucked in, addressing pressure points. The abdomen was prepared and draped in the usual sterile fashion. For extra precaution, an ioban dressing was applied to the skin. Using a 5-mm optical trocar, the left upper quadrant was entered without incidence. CO2 insufflation was tolerated. Additional 8-mm trocar in the right lower quadrant and a 12-mm trocar in the midabdomen to the right on the most lateral part of the abdomen were placed. The robot patient cart was docked on the left side of the patient. Using robotic scissors and atraumatic graspers, adhesiolysis was performed. On general inspection, there were moderate adhesion in the abdominal cavity. The adhesiolysis took additional 35 min. There was no enterotomies. The hernia defect measured 12 cm. I started by mobilizing the posterior rectus sheath on the left side. The semilunaris and perforators were identified. Incision of PSR medial to linea semilunaris was made  to expose underlying transversus muscle. This was followed by the division of transversus muscle to expose underlying transversalis fascia. The lateral space was dissected caudal to arcuate line toward space of Retzius. This release was plenty to approximate the posterior fascial closure with minimal tension. At this point the contralateral ports were inserted and 24 x 24 cm prolene mesh was inserted and secured with interrupted vicryl sutures laterally to the transversus abdominus muscle. The robot cart was undocked and re-docked from the right side.  The right side was also approached in similar manor to release the posterior rectus sheath and the Transversus Abdominis Muscle Release (TAR). The posterior sheath was then closed with 2-0 barbed suture and the anterior rectus muscle was closed with 0 PDS barbed suture. The prolene was unrolled and secured in similar mannor. There was minimal bleeding. as closed using 0 vicryl under direct vision. Then the port sites were approximated using 4-0 Vicryl in subcuticular fashion. Steri-Strips and a Band-Aid were applied at the port sites. An abdominal binder was applied loosely. The patient remained stable for the entire operation and was transferred to the recovery room in a stable condition. Instrument and lap counts were correct at the end of the case.        Paulette Wynne MD

## 2022-08-24 ENCOUNTER — HOSPITAL ENCOUNTER (EMERGENCY)
Age: 77
Discharge: HOME | End: 2022-08-24
Attending: EMERGENCY MEDICINE
Payer: MEDICARE

## 2022-08-24 ENCOUNTER — APPOINTMENT (OUTPATIENT)
Dept: GENERAL RADIOLOGY | Age: 77
End: 2022-08-24
Payer: MEDICARE

## 2022-08-24 VITALS
OXYGEN SATURATION: 99 % | SYSTOLIC BLOOD PRESSURE: 160 MMHG | TEMPERATURE: 98.1 F | HEART RATE: 102 BPM | DIASTOLIC BLOOD PRESSURE: 94 MMHG | RESPIRATION RATE: 22 BRPM

## 2022-08-24 DIAGNOSIS — K59.00 CONSTIPATION, UNSPECIFIED CONSTIPATION TYPE: Primary | ICD-10-CM

## 2022-08-24 LAB
ALBUMIN SERPL-MCNC: 3.9 GM/DL (ref 3.4–5)
ALP BLD-CCNC: 61 IU/L (ref 40–129)
ALT SERPL-CCNC: 27 U/L (ref 10–40)
ANION GAP SERPL CALCULATED.3IONS-SCNC: 12 MMOL/L (ref 4–16)
AST SERPL-CCNC: 38 IU/L (ref 15–37)
BASOPHILS ABSOLUTE: 0 K/CU MM
BASOPHILS RELATIVE PERCENT: 0.1 % (ref 0–1)
BILIRUB SERPL-MCNC: 0.7 MG/DL (ref 0–1)
BUN BLDV-MCNC: 15 MG/DL (ref 6–23)
CALCIUM SERPL-MCNC: 9.4 MG/DL (ref 8.3–10.6)
CHLORIDE BLD-SCNC: 94 MMOL/L (ref 99–110)
CO2: 25 MMOL/L (ref 21–32)
CREAT SERPL-MCNC: 0.8 MG/DL (ref 0.9–1.3)
DIFFERENTIAL TYPE: ABNORMAL
EOSINOPHILS ABSOLUTE: 0 K/CU MM
EOSINOPHILS RELATIVE PERCENT: 0.3 % (ref 0–3)
GFR AFRICAN AMERICAN: >60 ML/MIN/1.73M2
GFR NON-AFRICAN AMERICAN: >60 ML/MIN/1.73M2
GLUCOSE BLD-MCNC: 128 MG/DL (ref 70–99)
HCT VFR BLD CALC: 39.9 % (ref 42–52)
HEMOGLOBIN: 12.3 GM/DL (ref 13.5–18)
IMMATURE NEUTROPHIL %: 0.3 % (ref 0–0.43)
LACTATE: 2.8 MMOL/L (ref 0.4–2)
LIPASE: 12 IU/L (ref 13–60)
LYMPHOCYTES ABSOLUTE: 0.7 K/CU MM
LYMPHOCYTES RELATIVE PERCENT: 10.8 % (ref 24–44)
MCH RBC QN AUTO: 29.4 PG (ref 27–31)
MCHC RBC AUTO-ENTMCNC: 30.8 % (ref 32–36)
MCV RBC AUTO: 95.5 FL (ref 78–100)
MONOCYTES ABSOLUTE: 0.6 K/CU MM
MONOCYTES RELATIVE PERCENT: 8.2 % (ref 0–4)
NUCLEATED RBC %: 0 %
PDW BLD-RTO: 12.5 % (ref 11.7–14.9)
PLATELET # BLD: 203 K/CU MM (ref 140–440)
PMV BLD AUTO: 10 FL (ref 7.5–11.1)
POTASSIUM SERPL-SCNC: 4.7 MMOL/L (ref 3.5–5.1)
RBC # BLD: 4.18 M/CU MM (ref 4.6–6.2)
SEGMENTED NEUTROPHILS ABSOLUTE COUNT: 5.4 K/CU MM
SEGMENTED NEUTROPHILS RELATIVE PERCENT: 80.3 % (ref 36–66)
SODIUM BLD-SCNC: 131 MMOL/L (ref 135–145)
TOTAL IMMATURE NEUTOROPHIL: 0.02 K/CU MM
TOTAL NUCLEATED RBC: 0 K/CU MM
TOTAL PROTEIN: 7.3 GM/DL (ref 6.4–8.2)
WBC # BLD: 6.7 K/CU MM (ref 4–10.5)

## 2022-08-24 PROCEDURE — 85025 COMPLETE CBC W/AUTO DIFF WBC: CPT

## 2022-08-24 PROCEDURE — 74022 RADEX COMPL AQT ABD SERIES: CPT

## 2022-08-24 PROCEDURE — 99284 EMERGENCY DEPT VISIT MOD MDM: CPT

## 2022-08-24 PROCEDURE — 83690 ASSAY OF LIPASE: CPT

## 2022-08-24 PROCEDURE — 83605 ASSAY OF LACTIC ACID: CPT

## 2022-08-24 PROCEDURE — 2580000003 HC RX 258: Performed by: EMERGENCY MEDICINE

## 2022-08-24 PROCEDURE — 80053 COMPREHEN METABOLIC PANEL: CPT

## 2022-08-24 RX ORDER — 0.9 % SODIUM CHLORIDE 0.9 %
1000 INTRAVENOUS SOLUTION INTRAVENOUS ONCE
Status: COMPLETED | OUTPATIENT
Start: 2022-08-24 | End: 2022-08-24

## 2022-08-24 RX ADMIN — SODIUM CHLORIDE 1000 ML: 9 INJECTION, SOLUTION INTRAVENOUS at 17:25

## 2022-08-24 ASSESSMENT — PAIN SCALES - GENERAL: PAINLEVEL_OUTOF10: 10

## 2022-08-24 NOTE — ED TRIAGE NOTES
Pt reports that he had hernia repair and has not had a BM since (Sunday). Pt reports he has been taking oxycodone which is new for him. Pt c/o lower AP.

## 2022-08-24 NOTE — ED PROVIDER NOTES
[address] ENCOUNTER      Pt Name: Arcadio Del Valle  MRN: [id number]  [place] 1945  Date of evaluation: 8/24/2022  Provider: Brittany Burleson MD    CHIEF COMPLAINT       Chief Complaint   Patient presents with    Abdominal Pain     Pt c/o constipation         HISTORY OF PRESENT ILLNESS   (Location/Symptom, Timing/Onset, Context/Setting, Quality, Duration, Modifying Factors, Severity)  Note limiting factors. Arcadio Del Valle is a 77 y.o. male who presents to the emergency department due to severe constipation. [facility]    Nursing Notes were reviewed. This is a 76-year-old man who comes to Emergency Department complaining of constipation for 5 days. Patient says he had a hernia repair on Friday and has yet to have a bowel movement. Over the past 1 to 2 days, he has had very small stools and suprapubic cramping. He describes nausea but no vomiting. He denies recent fevers. Denies chest pain or difficulty breathing. Denies headache. He denies hematochezia. He denies dysuria or frequency. Patient is currently taking narcotic pain medication. Patient says that he took 2 enemas at home with no significant improvement of his symptoms. REVIEW OF SYSTEMS    (2-9 systems for level 4, 10 or more for level 5)     Review of Systems    10 systems were reviewed with this patient. All were negative with exception of those noted in the history of present illness above. Kalli Naylor        PAST MEDICAL HISTORY     Past Medical History:   Diagnosis Date    Acid reflux     CAD (coronary artery disease)     follows with Ahmed    Diverticulitis Last Episode In 2013    Hx of blood clots Dx 2013    \"Left Arm\"    Hyperlipidemia     Hypertension     Parathyroid adenoma     Prostate Cancer Dx 2016    2 Prostate Biopsies Done, Last Done In 2017    Teeth missing     Upper And Lower    Wears glasses     Wears partial dentures     Upper SURGICAL HISTORY       Past Surgical History:   Procedure Laterality Date    CARDIAC CATHETERIZATION  2015    COLONOSCOPY  Last Done In 2000's    Polyps Removed In Past    CORONARY STENT PLACEMENT  2018    DENTAL SURGERY      Teeth Extracted In Past    Fernando De Oliveira  08/19/2022    Laparoscopic robotic with mesh-By Dr. Manny Steve in [address] 8/19/2022    HERNIA INCISIONAL REPAIR LAPAROSCOPIC ROBOTIC WITH MESH WITH COMPONENT SEPARATION performed by Kassandra Wilkinson MD at [place] [address] Right 09/04/2018    PROSTATE BIOPSY  2016, 2017    \"Prostate Cancer Dx In 2016\"    [address] [facility]       Previous Medications    ASPIRIN 81 MG CHEWABLE TABLET    Take 1 tablet by mouth daily    CARVEDILOL (COREG) 3.125 MG TABLET    Take 3.125 mg by mouth 2 times daily    CYCLOBENZAPRINE (FLEXERIL) 10 MG TABLET    Take 1 tablet by mouth 3 times daily as needed for Muscle spasms    DOCUSATE SODIUM (COLACE) 100 MG CAPSULE    Take 1 capsule by mouth 2 times daily    OXYCODONE (ROXICODONE) 5 MG IMMEDIATE RELEASE TABLET    Take 1 tablet by mouth every 6 hours as needed for Pain for up to 7 days.     ROSUVASTATIN (CRESTOR) 20 MG TABLET    TAKE 1 TABLET BY MOUTH EVERYDAY AT BEDTIME       ALLERGIES     Sulfa antibiotics    FAMILY HISTORY       Family History   Problem Relation Age of Onset    Stroke Mother     Mental Illness Mother     Obesity Mother           SOCIAL HISTORY       Social History     Socioeconomic History    Marital status:      Spouse name: None    Number of children: None    Years of education: None    Highest education level: None   Tobacco Use    Smoking status: Never    Smokeless tobacco: Never   Vaping Use    Vaping Use: Never used   Substance and Sexual Activity    Alcohol use: Yes     Comment: \"A Couple Times A Week\"    Drug use: No    Sexual activity: Yes     Partners: Female       SCREENINGS         [facility] right consistent with central and   infrahilar bronchitis/pneumonitis or early/mild pneumonia. Findings most consistent with constipation with large amount stool throughout   tortuous colon and mildly reactive bowel-gas pattern or mild ileus. No   pathologic distention GI tract, air-fluid leveling or definite radiographic   evidence of intestinal obstruction. ED BEDSIDE ULTRASOUND:   Performed by ED Physician - none    LABS:  Labs Reviewed   CBC WITH AUTO DIFFERENTIAL - Abnormal; Notable for the following components:       Result Value    RBC 4.18 (*)     Hemoglobin 12.3 (*)     Hematocrit 39.9 (*)     MCHC 30.8 (*)     Segs Relative 80.3 (*)     Lymphocytes % 10.8 (*)     Monocytes % 8.2 (*)     All other components within normal limits   COMPREHENSIVE METABOLIC PANEL - Abnormal; Notable for the following components:    Sodium 131 (*)     Chloride 94 (*)     Creatinine 0.8 (*)     Glucose 128 (*)     AST 38 (*)     All other components within normal limits   LIPASE - Abnormal; Notable for the following components:    Lipase 12 (*)     All other components within normal limits   LACTIC ACID - Abnormal; Notable for the following components:    Lactate 2.8 (*)     All other components within normal limits   LACTIC ACID       All other labs were within normal range or not returned as of this dictation. EMERGENCY DEPARTMENT COURSE and DIFFERENTIAL DIAGNOSIS/MDM:   Vitals:    Vitals:    08/24/22 1429 08/24/22 1433 08/24/22 1511   BP: (!) 160/94     Pulse: (!) 103  (!) 102   Resp: 16  22   Temp:  98.1 °F (36.7 °C)    TempSrc:  Oral    SpO2: 99%  99%       MDM  Signs and symptoms are consistent with constipation secondary to postsurgical ileus combined with narcotic pain medication    However, the possibility of internal abdominal pathology including obstruction needs to be considered. Acute abdominal series shows no evidence of acute obstruction with no air-fluid levels.     The patient is still having the urge to defecate but is unable to pass stool. Patient had a spontaneous large bowel movement here in the emergency department and his symptoms have significantly improved. I lengthy discussion with the patient regarding the potential etiologies for constipation and recommended that he use opiates sparingly and continue to use his stool softeners. I strongly recommend that he follow-up with his surgeon, Dr. Sundeep Gutierrez, soon as possible. Patient has been strongly recommended to return to the emergency department if he has any worsening symptoms or is worsening pain, vomiting, diarrhea, or any other concerning symptoms. REASSESSMENT          CONSULTS:  None    PROCEDURES:  Unless otherwise noted below, none     Procedures    FINAL IMPRESSION      1. Constipation, unspecified constipation type          DISPOSITION/PLAN   DISPOSITION Decision To Discharge 08/24/2022 06:51:25 PM      PATIENT REFERRED TO:  Becky Izaguirre MD  [address] [place] [address] [employer]  [phone]    Call in 1 day      Vidhya Oneal MD  [address]  [address] [facility]  [phone]          DISCHARGE MEDICATIONS:  New Prescriptions    No medications on file     Controlled Substances Monitoring:     No flowsheet data found.     (Please note that portions of this note were completed with a voice recognition program.  Efforts were made to edit the dictations but occasionally words are mis-transcribed.)    Mari Alvarenga MD (electronically signed)  Attending Emergency Physician            Mari Alvarenga MD  08/24/22 [id number]

## 2022-08-24 NOTE — CARE COORDINATION
CM review of pt chart for readmission risk, last admission 8/19-21/22 for Hernia repair procedure. Pt discharged home on muscle relaxer and pain medication. Returned home with wife/tish. Pt returns to ER due to abd pain, possible constipation, no BM since Sunday. Dr Henrry Leo ER provider, POC pending results at this time.  ANALIARN/CM

## 2022-08-24 NOTE — ED NOTES
Pt states he has had a substantial bowel movement and he is still feeling the urge. MD Madina rhodes.       Sarah Rodriguez RN  08/24/22 2933

## 2022-08-24 NOTE — ED NOTES
Pt up to [facility]- minimal [address], RN  08/24/22 2954

## 2022-08-29 ENCOUNTER — OFFICE VISIT (OUTPATIENT)
Dept: SURGERY | Age: 77
End: 2022-08-29

## 2022-08-29 VITALS
DIASTOLIC BLOOD PRESSURE: 86 MMHG | SYSTOLIC BLOOD PRESSURE: 128 MMHG | HEART RATE: 82 BPM | BODY MASS INDEX: 27.11 KG/M2 | OXYGEN SATURATION: 99 % | WEIGHT: 204.6 LBS | HEIGHT: 73 IN

## 2022-08-29 DIAGNOSIS — Z09 POSTOPERATIVE EXAMINATION: Primary | ICD-10-CM

## 2022-08-29 PROCEDURE — 99024 POSTOP FOLLOW-UP VISIT: CPT | Performed by: SURGERY

## 2022-08-29 RX ORDER — POLYETHYLENE GLYCOL 3350 17 G/17G
17 POWDER, FOR SOLUTION ORAL DAILY
Qty: 1530 G | Refills: 1 | Status: SHIPPED | OUTPATIENT
Start: 2022-08-29 | End: 2022-09-28

## 2022-08-29 ASSESSMENT — PATIENT HEALTH QUESTIONNAIRE - PHQ9
SUM OF ALL RESPONSES TO PHQ QUESTIONS 1-9: 0
SUM OF ALL RESPONSES TO PHQ9 QUESTIONS 1 & 2: 0
SUM OF ALL RESPONSES TO PHQ QUESTIONS 1-9: 0
1. LITTLE INTEREST OR PLEASURE IN DOING THINGS: 0
2. FEELING DOWN, DEPRESSED OR HOPELESS: 0
SUM OF ALL RESPONSES TO PHQ QUESTIONS 1-9: 0
SUM OF ALL RESPONSES TO PHQ QUESTIONS 1-9: 0

## 2022-08-29 NOTE — PROGRESS NOTES
Female   Other Topics Concern    Not on file   Social History Narrative    Not on file     Social Determinants of Health     Financial Resource Strain: Not on file   Food Insecurity: Not on file   Transportation Needs: Not on file   Physical Activity: Not on file   Stress: Not on file   Social Connections: Not on file   Intimate Partner Violence: Not on file   Housing Stability: Not on file       OBJECTIVE:   Physical Exam    Wound well healed without signs of active infection. Suture line intact. Abdomen soft, nontender, nondistended. ASSESSMENT:  Patient doing well on this post operative check. Wounds well healed. 1. Postoperative examination        PLAN:  Will add miralax and encouraged increase water intake  F/u in two weeks  Continue same  Increase activity as tolerated        No orders of the defined types were placed in this encounter. Orders Placed This Encounter   Medications    polyethylene glycol (GLYCOLAX) 17 GM/SCOOP powder     Sig: Take 17 g by mouth daily     Dispense:  1530 g     Refill:  1        Follow Up: No follow-ups on file.     Kingston Babinski, MD

## 2022-09-19 PROBLEM — Z01.818 PRE-OP TESTING: Status: RESOLVED | Noted: 2022-08-19 | Resolved: 2022-09-19

## 2022-11-01 ENCOUNTER — OFFICE VISIT (OUTPATIENT)
Dept: FAMILY MEDICINE CLINIC | Age: 77
End: 2022-11-01
Payer: MEDICARE

## 2022-11-01 VITALS
TEMPERATURE: 98.5 F | SYSTOLIC BLOOD PRESSURE: 130 MMHG | OXYGEN SATURATION: 99 % | HEART RATE: 68 BPM | WEIGHT: 201.4 LBS | DIASTOLIC BLOOD PRESSURE: 90 MMHG | BODY MASS INDEX: 26.57 KG/M2

## 2022-11-01 DIAGNOSIS — K30 UPSET STOMACH: Primary | ICD-10-CM

## 2022-11-01 PROCEDURE — 1036F TOBACCO NON-USER: CPT | Performed by: NURSE PRACTITIONER

## 2022-11-01 PROCEDURE — 1123F ACP DISCUSS/DSCN MKR DOCD: CPT | Performed by: NURSE PRACTITIONER

## 2022-11-01 PROCEDURE — G8417 CALC BMI ABV UP PARAM F/U: HCPCS | Performed by: NURSE PRACTITIONER

## 2022-11-01 PROCEDURE — G8484 FLU IMMUNIZE NO ADMIN: HCPCS | Performed by: NURSE PRACTITIONER

## 2022-11-01 PROCEDURE — 3074F SYST BP LT 130 MM HG: CPT | Performed by: NURSE PRACTITIONER

## 2022-11-01 PROCEDURE — 3078F DIAST BP <80 MM HG: CPT | Performed by: NURSE PRACTITIONER

## 2022-11-01 PROCEDURE — 99203 OFFICE O/P NEW LOW 30 MIN: CPT | Performed by: NURSE PRACTITIONER

## 2022-11-01 PROCEDURE — G8427 DOCREV CUR MEDS BY ELIG CLIN: HCPCS | Performed by: NURSE PRACTITIONER

## 2022-11-01 RX ORDER — ONDANSETRON 4 MG/1
4 TABLET, ORALLY DISINTEGRATING ORAL EVERY 8 HOURS PRN
Qty: 15 TABLET | Refills: 0 | Status: SHIPPED | OUTPATIENT
Start: 2022-11-01

## 2022-11-01 ASSESSMENT — ENCOUNTER SYMPTOMS
CHEST TIGHTNESS: 0
SHORTNESS OF BREATH: 0
WHEEZING: 0
DIARRHEA: 0
SINUS PAIN: 0
ABDOMINAL PAIN: 1
HEMATOCHEZIA: 0
SORE THROAT: 0
VOMITING: 0
SINUS PRESSURE: 0
FLATUS: 0
COUGH: 0
CONSTIPATION: 0
NAUSEA: 1
BELCHING: 0
RHINORRHEA: 0

## 2022-11-01 NOTE — PROGRESS NOTES
Christa Fonseca   69 y.o.  male  [id number]      Chief Complaint   Patient presents with    GI Problem     Discomfort in upper abdomen, hx of hernia surgery in August        Subjective:  69 y.o.male is here for a follow up. He has the following chronic/acute medical problems:  Patient Active Problem List   Diagnosis    Chest pain at rest    DVT of upper extremity (deep vein thrombosis) (HCC)    Prostate cancer ([address]    Neck pain on left side    Essential hypertension    Hyperlipemia    Idiopathic parathyroidism (HCC)    Hyperparathyroidism ([address]    Angina pectoris ([address]    CAD in native artery    Chest pain    Critical illness myopathy    Generalized weakness    Uncontrolled pain    Small bowel perforation (HCC)    Acute kidney injury (SUSY) with acute tubular necrosis (ATN) (HCC)    Paroxysmal atrial fibrillation (HCC)    Diverticulitis    History of DVT (deep vein thrombosis)    Mild protein-calorie malnutrition (HCC)    Moderate malnutrition (HCC)    Syncope due to orthostatic hypotension    Orthostatic hypotension    Incisional hernia of anterior abdominal wall without obstruction or gangrene       Abdominal Pain  This is a new problem. The current episode started today. The onset quality is undetermined. The problem occurs constantly. The pain is located in the epigastric region. The pain is mild. Quality: states not really pain - states more of a nausea - states if it got worse he would feel as if he had to vomit. The abdominal pain radiates to the epigastric region. Associated symptoms include nausea. Pertinent negatives include no anorexia, arthralgias, belching, constipation, diarrhea, dysuria, fever, flatus, frequency, headaches, hematochezia, hematuria, melena, myalgias, vomiting or weight loss. Nothing aggravates the pain. The pain is relieved by Nothing. He has tried nothing for the symptoms. Review of Systems   Constitutional:  Positive for appetite change (decreased).  Negative for chills, fatigue, fever and weight loss. HENT:  Negative for congestion, ear pain, postnasal drip, rhinorrhea, sinus pressure, sinus pain, sneezing and sore throat. Respiratory:  Negative for cough, chest tightness, shortness of breath and wheezing. Gastrointestinal:  Positive for abdominal pain (discomfort upset stomach) and nausea. Negative for anorexia, constipation, diarrhea, flatus, hematochezia, melena and vomiting. Genitourinary:  Negative for dysuria, frequency and hematuria. Musculoskeletal:  Negative for arthralgias and myalgias. Skin:  Negative for rash. Neurological:  Negative for dizziness, light-headedness and headaches. Current Outpatient Medications   Medication Sig Dispense Refill    ondansetron (ZOFRAN-ODT) 4 MG disintegrating tablet Take 1 tablet by mouth every 8 hours as needed for Nausea or Vomiting 15 tablet 0    carvedilol (COREG) 3.125 MG tablet Take 3.125 mg by mouth 2 times daily      rosuvastatin (CRESTOR) 20 MG tablet TAKE 1 TABLET BY MOUTH EVERYDAY AT BEDTIME      docusate sodium (COLACE) 100 MG capsule Take 1 capsule by mouth 2 times daily      aspirin 81 MG chewable tablet Take 1 tablet by mouth daily 90 tablet 0     No current facility-administered medications for this visit. Past medical, family,surgical history reviewed today. Objective:  BP (!) 130/90   Pulse 68   Temp 98.5 °F (36.9 °C) (Oral)   Wt 201 lb 6.4 oz (91.4 kg)   SpO2 99%   BMI 26.57 kg/m²   BP Readings from Last 3 Encounters:   11/01/22 (!) 130/90   08/29/22 128/86   08/24/22 (!) 160/94     Wt Readings from Last 3 Encounters:   11/01/22 201 lb 6.4 oz (91.4 kg)   08/29/22 204 lb 9.6 oz (92.8 kg)   08/20/22 218 lb (98.9 kg)         Physical Exam  Constitutional:       Appearance: Normal appearance. HENT:      Head: Normocephalic. Cardiovascular:      Rate and Rhythm: Normal rate and regular rhythm. Heart sounds: Normal heart sounds.    Pulmonary:      Effort: Pulmonary effort is normal. Breath sounds: Normal breath sounds. Abdominal:      General: Bowel sounds are normal.      Palpations: Abdomen is soft. Tenderness: There is no abdominal tenderness. Hernia: No hernia is present. Musculoskeletal:      Cervical back: Neck supple. Skin:     General: Skin is warm and dry. Neurological:      Mental Status: He is alert and oriented to person, place, and time. Psychiatric:         Mood and Affect: Mood normal.         Behavior: Behavior normal.       Lab Results   Component Value Date    WBC 6.7 08/24/2022    HGB 12.3 (L) 08/24/2022    HCT 39.9 (L) 08/24/2022    MCV 95.5 08/24/2022     08/24/2022     Lab Results   Component Value Date     (L) 08/24/2022    K 4.7 08/24/2022    CL 94 (L) 08/24/2022    CO2 25 08/24/2022    BUN 15 08/24/2022    CREATININE 0.8 (L) 08/24/2022    GLUCOSE 128 (H) 08/24/2022    CALCIUM 9.4 08/24/2022    PROT 7.3 08/24/2022    LABALBU 3.9 08/24/2022    BILITOT 0.7 08/24/2022    ALKPHOS 61 08/24/2022    AST 38 (H) 08/24/2022    ALT 27 08/24/2022    LABGLOM >60 08/24/2022    GFRAA >60 08/24/2022     Lab Results   Component Value Date    CHOL 118 08/20/2019    CHOL 129 08/09/2019    CHOL 104 10/15/2017     Lab Results   Component Value Date    TRIG 45 08/20/2019    TRIG 49 08/09/2019    TRIG 42 10/15/2017     Lab Results   Component Value Date    HDL 54 08/20/2019    HDL 57 08/09/2019    HDL 51 10/15/2017     No results found for: LDLCALC, LDLCHOLESTEROL  No results found for: LABA1C  Lab Results   Component Value Date    TSHHS 1.390 05/05/2021         ASSESSMENT/PLAN:      1. Upset stomach  Zofran 4 mg disintegrating tablet 1 tablet Q 8 PRN. Discussed with patient if no improvement in 24 to 48 hours to follow up. - ondansetron (ZOFRAN-ODT) 4 MG disintegrating tablet; Take 1 tablet by mouth every 8 hours as needed for Nausea or Vomiting  Dispense: 15 tablet; Refill: 0          There are no discontinued medications.     No orders of the defined types were placed in this encounter. Care discussed with patient. Questions answered. Patient verbalizes understanding and agrees with plan. After visit summary provided. Advised to call for any problems, questions, or concerns. Return if symptoms worsen or fail to improve.                                            Signed:  DAYNA Nichols CNP  11/01/22  8:52 AM

## 2024-06-29 ENCOUNTER — HOSPITAL ENCOUNTER (INPATIENT)
Age: 79
LOS: 1 days | Discharge: HOME | DRG: 287 | End: 2024-07-02
Attending: STUDENT IN AN ORGANIZED HEALTH CARE EDUCATION/TRAINING PROGRAM | Admitting: STUDENT IN AN ORGANIZED HEALTH CARE EDUCATION/TRAINING PROGRAM
Payer: MEDICARE

## 2024-06-29 ENCOUNTER — APPOINTMENT (OUTPATIENT)
Dept: GENERAL RADIOLOGY | Age: 79
DRG: 287 | End: 2024-06-29
Payer: MEDICARE

## 2024-06-29 DIAGNOSIS — R07.9 CHEST PAIN: ICD-10-CM

## 2024-06-29 DIAGNOSIS — R07.9 CHEST PAIN, UNSPECIFIED TYPE: Primary | ICD-10-CM

## 2024-06-29 DIAGNOSIS — I50.9 CONGESTIVE HEART FAILURE, UNSPECIFIED HF CHRONICITY, UNSPECIFIED HEART FAILURE TYPE (HCC): ICD-10-CM

## 2024-06-29 PROBLEM — I25.119 CHEST PAIN DUE TO CAD (HCC): Status: ACTIVE | Noted: 2024-06-29

## 2024-06-29 LAB
ALBUMIN SERPL-MCNC: 4 GM/DL (ref 3.4–5)
ALP BLD-CCNC: 57 IU/L (ref 40–129)
ALT SERPL-CCNC: 16 U/L (ref 10–40)
ANION GAP SERPL CALCULATED.3IONS-SCNC: 9 MMOL/L (ref 7–16)
AST SERPL-CCNC: 21 IU/L (ref 15–37)
BASOPHILS ABSOLUTE: 0 K/CU MM
BASOPHILS RELATIVE PERCENT: 0.5 % (ref 0–1)
BILIRUB SERPL-MCNC: 0.5 MG/DL (ref 0–1)
BUN SERPL-MCNC: 27 MG/DL (ref 6–23)
CALCIUM SERPL-MCNC: 9.7 MG/DL (ref 8.3–10.6)
CHLORIDE BLD-SCNC: 101 MMOL/L (ref 99–110)
CO2: 27 MMOL/L (ref 21–32)
CREAT SERPL-MCNC: 0.9 MG/DL (ref 0.9–1.3)
DIFFERENTIAL TYPE: ABNORMAL
EOSINOPHILS ABSOLUTE: 0.2 K/CU MM
EOSINOPHILS RELATIVE PERCENT: 4.2 % (ref 0–3)
GFR, ESTIMATED: 88 ML/MIN/1.73M2
GLUCOSE SERPL-MCNC: 86 MG/DL (ref 70–99)
HCT VFR BLD CALC: 42.6 % (ref 42–52)
HEMOGLOBIN: 13.6 GM/DL (ref 13.5–18)
IMMATURE NEUTROPHIL %: 0 % (ref 0–0.43)
LYMPHOCYTES ABSOLUTE: 2.4 K/CU MM
LYMPHOCYTES RELATIVE PERCENT: 41.6 % (ref 24–44)
MCH RBC QN AUTO: 30.3 PG (ref 27–31)
MCHC RBC AUTO-ENTMCNC: 31.9 % (ref 32–36)
MCV RBC AUTO: 94.9 FL (ref 78–100)
MONOCYTES ABSOLUTE: 0.6 K/CU MM
MONOCYTES RELATIVE PERCENT: 10.3 % (ref 0–4)
NEUTROPHILS ABSOLUTE: 2.5 K/CU MM
NEUTROPHILS RELATIVE PERCENT: 43.4 % (ref 36–66)
NUCLEATED RBC %: 0 %
PDW BLD-RTO: 13 % (ref 11.7–14.9)
PLATELET # BLD: 180 K/CU MM (ref 140–440)
PMV BLD AUTO: 11 FL (ref 7.5–11.1)
POTASSIUM SERPL-SCNC: 4.6 MMOL/L (ref 3.5–5.1)
PRO-BNP: 128.8 PG/ML
RBC # BLD: 4.49 M/CU MM (ref 4.6–6.2)
SODIUM BLD-SCNC: 137 MMOL/L (ref 135–145)
TOTAL IMMATURE NEUTOROPHIL: 0 K/CU MM
TOTAL NUCLEATED RBC: 0 K/CU MM
TOTAL PROTEIN: 6.8 GM/DL (ref 6.4–8.2)
TROPONIN, HIGH SENSITIVITY: 15 NG/L (ref 0–22)
TROPONIN, HIGH SENSITIVITY: 16 NG/L (ref 0–22)
WBC # BLD: 5.7 K/CU MM (ref 4–10.5)

## 2024-06-29 PROCEDURE — 99285 EMERGENCY DEPT VISIT HI MDM: CPT

## 2024-06-29 PROCEDURE — 85025 COMPLETE CBC W/AUTO DIFF WBC: CPT

## 2024-06-29 PROCEDURE — G0378 HOSPITAL OBSERVATION PER HR: HCPCS

## 2024-06-29 PROCEDURE — 6370000000 HC RX 637 (ALT 250 FOR IP): Performed by: STUDENT IN AN ORGANIZED HEALTH CARE EDUCATION/TRAINING PROGRAM

## 2024-06-29 PROCEDURE — 71045 X-RAY EXAM CHEST 1 VIEW: CPT

## 2024-06-29 PROCEDURE — 93005 ELECTROCARDIOGRAM TRACING: CPT | Performed by: STUDENT IN AN ORGANIZED HEALTH CARE EDUCATION/TRAINING PROGRAM

## 2024-06-29 PROCEDURE — 2580000003 HC RX 258: Performed by: STUDENT IN AN ORGANIZED HEALTH CARE EDUCATION/TRAINING PROGRAM

## 2024-06-29 PROCEDURE — 83880 ASSAY OF NATRIURETIC PEPTIDE: CPT

## 2024-06-29 PROCEDURE — 84484 ASSAY OF TROPONIN QUANT: CPT

## 2024-06-29 PROCEDURE — 80053 COMPREHEN METABOLIC PANEL: CPT

## 2024-06-29 RX ORDER — SODIUM CHLORIDE 9 MG/ML
INJECTION, SOLUTION INTRAVENOUS PRN
Status: DISCONTINUED | OUTPATIENT
Start: 2024-06-29 | End: 2024-07-02 | Stop reason: HOSPADM

## 2024-06-29 RX ORDER — ACETAMINOPHEN 500 MG
1000 TABLET ORAL ONCE
Status: COMPLETED | OUTPATIENT
Start: 2024-06-29 | End: 2024-06-29

## 2024-06-29 RX ORDER — ACETAMINOPHEN 325 MG/1
650 TABLET ORAL EVERY 6 HOURS PRN
Status: DISCONTINUED | OUTPATIENT
Start: 2024-06-29 | End: 2024-07-01 | Stop reason: ALTCHOICE

## 2024-06-29 RX ORDER — SODIUM CHLORIDE, SODIUM LACTATE, POTASSIUM CHLORIDE, CALCIUM CHLORIDE 600; 310; 30; 20 MG/100ML; MG/100ML; MG/100ML; MG/100ML
INJECTION, SOLUTION INTRAVENOUS CONTINUOUS
Status: ACTIVE | OUTPATIENT
Start: 2024-06-29 | End: 2024-06-30

## 2024-06-29 RX ORDER — LANOLIN ALCOHOL/MO/W.PET/CERES
3 CREAM (GRAM) TOPICAL NIGHTLY PRN
Status: DISCONTINUED | OUTPATIENT
Start: 2024-06-29 | End: 2024-07-02 | Stop reason: HOSPADM

## 2024-06-29 RX ORDER — ONDANSETRON 2 MG/ML
4 INJECTION INTRAMUSCULAR; INTRAVENOUS EVERY 6 HOURS PRN
Status: DISCONTINUED | OUTPATIENT
Start: 2024-06-29 | End: 2024-07-02 | Stop reason: HOSPADM

## 2024-06-29 RX ORDER — POTASSIUM CHLORIDE 7.45 MG/ML
10 INJECTION INTRAVENOUS PRN
Status: DISCONTINUED | OUTPATIENT
Start: 2024-06-29 | End: 2024-07-02 | Stop reason: HOSPADM

## 2024-06-29 RX ORDER — CARVEDILOL 6.25 MG/1
3.12 TABLET ORAL 2 TIMES DAILY
Status: DISCONTINUED | OUTPATIENT
Start: 2024-06-29 | End: 2024-07-02 | Stop reason: HOSPADM

## 2024-06-29 RX ORDER — ONDANSETRON 4 MG/1
4 TABLET, ORALLY DISINTEGRATING ORAL EVERY 8 HOURS PRN
Status: DISCONTINUED | OUTPATIENT
Start: 2024-06-29 | End: 2024-07-02 | Stop reason: HOSPADM

## 2024-06-29 RX ORDER — SODIUM CHLORIDE 0.9 % (FLUSH) 0.9 %
5-40 SYRINGE (ML) INJECTION PRN
Status: DISCONTINUED | OUTPATIENT
Start: 2024-06-29 | End: 2024-07-02 | Stop reason: HOSPADM

## 2024-06-29 RX ORDER — DOCUSATE SODIUM 100 MG/1
100 CAPSULE, LIQUID FILLED ORAL 2 TIMES DAILY
Status: DISCONTINUED | OUTPATIENT
Start: 2024-06-29 | End: 2024-07-02 | Stop reason: HOSPADM

## 2024-06-29 RX ORDER — NITROGLYCERIN 0.4 MG/1
0.4 TABLET SUBLINGUAL EVERY 5 MIN PRN
Status: DISCONTINUED | OUTPATIENT
Start: 2024-06-29 | End: 2024-07-02 | Stop reason: HOSPADM

## 2024-06-29 RX ORDER — ASPIRIN 81 MG/1
81 TABLET, CHEWABLE ORAL DAILY
Status: DISCONTINUED | OUTPATIENT
Start: 2024-06-30 | End: 2024-07-02 | Stop reason: HOSPADM

## 2024-06-29 RX ORDER — POLYETHYLENE GLYCOL 3350 17 G/17G
17 POWDER, FOR SOLUTION ORAL DAILY PRN
Status: DISCONTINUED | OUTPATIENT
Start: 2024-06-29 | End: 2024-07-02 | Stop reason: HOSPADM

## 2024-06-29 RX ORDER — ACETAMINOPHEN 650 MG/1
650 SUPPOSITORY RECTAL EVERY 6 HOURS PRN
Status: DISCONTINUED | OUTPATIENT
Start: 2024-06-29 | End: 2024-07-01 | Stop reason: ALTCHOICE

## 2024-06-29 RX ORDER — ASPIRIN 81 MG/1
324 TABLET, CHEWABLE ORAL ONCE
Status: COMPLETED | OUTPATIENT
Start: 2024-06-29 | End: 2024-06-29

## 2024-06-29 RX ORDER — ROSUVASTATIN CALCIUM 20 MG/1
20 TABLET, COATED ORAL
Status: DISCONTINUED | OUTPATIENT
Start: 2024-06-29 | End: 2024-07-02 | Stop reason: HOSPADM

## 2024-06-29 RX ORDER — SODIUM CHLORIDE 0.9 % (FLUSH) 0.9 %
5-40 SYRINGE (ML) INJECTION EVERY 12 HOURS SCHEDULED
Status: DISCONTINUED | OUTPATIENT
Start: 2024-06-29 | End: 2024-07-02 | Stop reason: HOSPADM

## 2024-06-29 RX ORDER — ENOXAPARIN SODIUM 100 MG/ML
40 INJECTION SUBCUTANEOUS EVERY EVENING
Status: DISCONTINUED | OUTPATIENT
Start: 2024-06-30 | End: 2024-07-01

## 2024-06-29 RX ORDER — MAGNESIUM SULFATE IN WATER 40 MG/ML
2000 INJECTION, SOLUTION INTRAVENOUS PRN
Status: DISCONTINUED | OUTPATIENT
Start: 2024-06-29 | End: 2024-07-02 | Stop reason: HOSPADM

## 2024-06-29 RX ADMIN — ACETAMINOPHEN 1000 MG: 500 TABLET ORAL at 20:43

## 2024-06-29 RX ADMIN — ASPIRIN 324 MG: 81 TABLET, CHEWABLE ORAL at 20:43

## 2024-06-29 RX ADMIN — SODIUM CHLORIDE, PRESERVATIVE FREE 10 ML: 5 INJECTION INTRAVENOUS at 23:11

## 2024-06-29 RX ADMIN — CARVEDILOL 3.12 MG: 6.25 TABLET, FILM COATED ORAL at 23:07

## 2024-06-29 RX ADMIN — ROSUVASTATIN CALCIUM 20 MG: 20 TABLET, COATED ORAL at 23:06

## 2024-06-29 RX ADMIN — DOCUSATE SODIUM 100 MG: 100 CAPSULE, LIQUID FILLED ORAL at 23:06

## 2024-06-29 RX ADMIN — SODIUM CHLORIDE, POTASSIUM CHLORIDE, SODIUM LACTATE AND CALCIUM CHLORIDE: 600; 310; 30; 20 INJECTION, SOLUTION INTRAVENOUS at 23:09

## 2024-06-29 ASSESSMENT — PAIN SCALES - GENERAL
PAINLEVEL_OUTOF10: 0
PAINLEVEL_OUTOF10: 3

## 2024-06-29 ASSESSMENT — PAIN - FUNCTIONAL ASSESSMENT: PAIN_FUNCTIONAL_ASSESSMENT: 0-10

## 2024-06-29 ASSESSMENT — PAIN DESCRIPTION - LOCATION: LOCATION: CHEST

## 2024-06-29 NOTE — ED PROVIDER NOTES
Emergency Department Encounter        Pt Name: Luis Eden  MRN: [id number]  Birthdate 1945  Date of evaluation: 6/29/2024  ED Physician: Chris Pratt MD    CHIEF COMPLAINT     Triage Chief Complaint:   Chest Pain (For several weeks and has had a negative stress test- has a heart history. )      HISTORY OF PRESENT ILLNESS & REVIEW OF SYSTEMS     History obtained from the patient and staff and spouse at bedside.    Luis Eden is a 78 y.o. male who presents to the emergency department for evaluation of chest pain.  Says has had chest pain for about 3 weeks.  Says that it occurs during exertion.  Says that he had a stress test earlier this year that was negative.  Says this was performed before he is having the pain.  Says he does have a stent.  Says that feels like pressure when he is up moving around exerting himself.  Says he took an aspirin last night but did not take any today.  Says he is not having any pain at rest.  Says he does have a cardiologist.  Says that he used to be on Eliquis but is no longer on Eliquis.        Patient denies any new Headache, Fever, Chills, Cough, Shortness of breath, Abdominal pain, Nausea, Vomiting, Diarrhea, Constipation, and Leg swelling.    The patient has no other acute complaints at this time.  Review of systems as above.          PAST MED/SURG/SOCIAL/FAM HISTORY & ALLERGY & MEDICATIONS     Past Medical History:   Diagnosis Date    Acid reflux     CAD (coronary artery disease)     follows with Ahmed    Diverticulitis Last Episode In 2013    Hx of blood clots Dx 2013    \"Left Arm\"    Hyperlipidemia     Hypertension     Parathyroid adenoma     Prostate Cancer Dx 2016    2 Prostate Biopsies Done, Last Done In 2017    Teeth missing     Upper And Lower    Wears glasses     Wears partial dentures     Upper     Patient Active Problem List   Diagnosis Code    Chest pain at rest R07.9    DVT of upper extremity (deep vein thrombosis) ([facility]) I82.629    Prostate cancer ([facility])

## 2024-06-30 LAB
CHOLEST SERPL-MCNC: 126 MG/DL
EKG ATRIAL RATE: 68 BPM
EKG DIAGNOSIS: NORMAL
EKG P AXIS: 46 DEGREES
EKG P-R INTERVAL: 206 MS
EKG Q-T INTERVAL: 404 MS
EKG QRS DURATION: 92 MS
EKG QTC CALCULATION (BAZETT): 429 MS
EKG R AXIS: -33 DEGREES
EKG T AXIS: 16 DEGREES
EKG VENTRICULAR RATE: 68 BPM
ESTIMATED AVERAGE GLUCOSE: 114 MG/DL
HBA1C MFR BLD: 5.6 % (ref 4.2–6.3)
HDLC SERPL-MCNC: 54 MG/DL
INR BLD: 1.1 INDEX
LDLC SERPL CALC-MCNC: 64 MG/DL
PROTHROMBIN TIME: 14.4 SECONDS (ref 11.7–14.5)
TRIGL SERPL-MCNC: 39 MG/DL
TROPONIN, HIGH SENSITIVITY: 15 NG/L (ref 0–22)
TSH SERPL DL<=0.005 MIU/L-ACNC: 1.25 UIU/ML (ref 0.27–4.2)

## 2024-06-30 PROCEDURE — 85610 PROTHROMBIN TIME: CPT

## 2024-06-30 PROCEDURE — 94761 N-INVAS EAR/PLS OXIMETRY MLT: CPT

## 2024-06-30 PROCEDURE — 2580000003 HC RX 258: Performed by: STUDENT IN AN ORGANIZED HEALTH CARE EDUCATION/TRAINING PROGRAM

## 2024-06-30 PROCEDURE — G0378 HOSPITAL OBSERVATION PER HR: HCPCS

## 2024-06-30 PROCEDURE — 36415 COLL VENOUS BLD VENIPUNCTURE: CPT

## 2024-06-30 PROCEDURE — 80061 LIPID PANEL: CPT

## 2024-06-30 PROCEDURE — 6360000002 HC RX W HCPCS: Performed by: STUDENT IN AN ORGANIZED HEALTH CARE EDUCATION/TRAINING PROGRAM

## 2024-06-30 PROCEDURE — 84484 ASSAY OF TROPONIN QUANT: CPT

## 2024-06-30 PROCEDURE — 93010 ELECTROCARDIOGRAM REPORT: CPT | Performed by: INTERNAL MEDICINE

## 2024-06-30 PROCEDURE — 6370000000 HC RX 637 (ALT 250 FOR IP): Performed by: STUDENT IN AN ORGANIZED HEALTH CARE EDUCATION/TRAINING PROGRAM

## 2024-06-30 PROCEDURE — 83036 HEMOGLOBIN GLYCOSYLATED A1C: CPT

## 2024-06-30 PROCEDURE — 99223 1ST HOSP IP/OBS HIGH 75: CPT | Performed by: INTERNAL MEDICINE

## 2024-06-30 PROCEDURE — 6370000000 HC RX 637 (ALT 250 FOR IP): Performed by: INTERNAL MEDICINE

## 2024-06-30 PROCEDURE — 84443 ASSAY THYROID STIM HORMONE: CPT

## 2024-06-30 RX ORDER — ISOSORBIDE MONONITRATE 30 MG/1
30 TABLET, EXTENDED RELEASE ORAL DAILY
Status: DISCONTINUED | OUTPATIENT
Start: 2024-06-30 | End: 2024-07-02 | Stop reason: HOSPADM

## 2024-06-30 RX ADMIN — DOCUSATE SODIUM 100 MG: 100 CAPSULE, LIQUID FILLED ORAL at 10:08

## 2024-06-30 RX ADMIN — CARVEDILOL 3.12 MG: 6.25 TABLET, FILM COATED ORAL at 20:52

## 2024-06-30 RX ADMIN — DOCUSATE SODIUM 100 MG: 100 CAPSULE, LIQUID FILLED ORAL at 20:54

## 2024-06-30 RX ADMIN — SODIUM CHLORIDE, PRESERVATIVE FREE 10 ML: 5 INJECTION INTRAVENOUS at 10:09

## 2024-06-30 RX ADMIN — SODIUM CHLORIDE, PRESERVATIVE FREE 10 ML: 5 INJECTION INTRAVENOUS at 20:54

## 2024-06-30 RX ADMIN — ISOSORBIDE MONONITRATE 30 MG: 30 TABLET, EXTENDED RELEASE ORAL at 11:19

## 2024-06-30 RX ADMIN — ROSUVASTATIN CALCIUM 20 MG: 20 TABLET, COATED ORAL at 20:52

## 2024-06-30 RX ADMIN — ENOXAPARIN SODIUM 40 MG: 100 INJECTION SUBCUTANEOUS at 17:39

## 2024-06-30 RX ADMIN — ASPIRIN 81 MG: 81 TABLET, CHEWABLE ORAL at 10:08

## 2024-06-30 NOTE — PROGRESS NOTES
Pt. Refusing bed alarm. Educated on safety. Charge RN notified. Bed alarm waiver signed.   Megan Reno LPN.

## 2024-06-30 NOTE — PLAN OF CARE
Problem: Discharge Planning  Goal: Discharge to home or other facility with appropriate resources  6/30/2024 1018 by Christopher Reno LPN  Outcome: Progressing  6/29/2024 2318 by Awais Deleon, RN  Outcome: Progressing     Problem: Pain  Goal: Verbalizes/displays adequate comfort level or baseline comfort level  6/30/2024 1018 by Christopher Reno LPN  Outcome: Progressing  6/29/2024 2318 by Awais Deleon, RN  Outcome: Progressing     Problem: ABCDS Injury Assessment  Goal: Absence of physical injury  6/30/2024 1018 by Christopher Reno LPN  Outcome: Progressing  6/29/2024 2318 by Awais Deleon RN  Outcome: Progressing     Problem: Safety - Adult  Goal: Free from fall injury  Outcome: Progressing

## 2024-06-30 NOTE — CONSULTS
CARDIOLOGY CONSULT NOTE   Reason for consultation: Chest pain     Referring physician:  Kevon Martin MD     Primary care physician: Benja Leblanc MD      Dear  Dr. Kevon Martin MD   Thanks for the consult.    Chief Complaints :  Chief Complaint   Patient presents with    Chest Pain     For several weeks and has had a negative stress test- has a heart history.         History of present illness:Luis is a 78 y.o.year old who presents with complaints of intermittent chest pain ongoing for last several months especially when he is climbing or working on treadmill is discomfort to middle of the chest and then gets better with rest he says back in 2019 he was having several symptoms when he ended up with stents he had a stress test at [facility] in 2022 and he says he does not believe that the stress test was really revealing he was apparently on Ranexa and Eliquis for questionable A-fib?  He does not believe that he had A-fib?  As he was thinking that arrhythmias noted was because he was lifting weights?  Since he was not taking Eliquis his primary care physician took him off Eliquis and put him on Plavix he has history of mid LAD stent however cardiac cath in 2021 showed the stent was patent and he had no other significant disease.  He denies any ankle swelling he denies any palpitations but feels uncomfortable when he is lifting weights    Past medical history:    has a past medical history of Acid reflux, CAD (coronary artery disease), Diverticulitis, Hx of blood clots, Hyperlipidemia, Hypertension, Parathyroid adenoma, Prostate Cancer, Teeth missing, Wears glasses, and Wears partial dentures.  Past surgical history:   has a past surgical history that includes Vasectomy (1986); Prostate biopsy (2016, 2017); Dental surgery; Cardiac catheterization (2015); Colonoscopy (Last Done In 2000's); Hemorrhoid surgery (1958); parathyroidectomy (Right, 09/04/2018); Coronary stent placement (2018); hernia

## 2024-06-30 NOTE — H&P
History and Physical      Name:  Luis Eden /Age/Sex: 1945  (78 y.o. male)   MRN & CSN:  [id number] & [id number] Encounter Date/Time: 2024 9:31 PM   Location:  ED16/ED-16 PCP: Benja Leblanc MD       Hospital Day: 1    Assessment and Plan:     Patient is a 78-year-old male who presented with chest pain.     # Chest pain due to CAD  # CAD s/p PCI to mLAD in 3/2019  - Endorsed intermittent, substernal, non-radiating chest pain over past 2-3 weeks. Symptoms worse after exercising on treadmill, and resolve after few minutes of rest. Did not try NTG. No presyncope, syncope, orthopnea or LE edema.   - Initial Tn negative. ECG without acute ischemic changes (old changes).   - Currently asymptomatic. Continue ASA, Crestor and Coreg. Cardiology consulted, appreciate assistance. NTG and repeat ECG prn. Follow-up repeat Tn.      # Hx of prostate cancer s/p radical prostatectomy in 10/2021 complicated by small bowel perforation    Checklist:  Advanced care planning: full  Diet: NPO past midnight pending Cardiology evaluation  DVT ppx: Lovenox    Disposition: place in observation.  Estimated discharge: 1-2 day(s).  Current living situation: home.  Expected disposition: home.    Spoke with ED provider who recommended admission for the patient and I agree with that plan.  Personally reviewed lab studies and imaging.  EKG interpreted personally and results as stated above.  Imaging that was interpreted personally and results as stated above.    History of Present Illness:     Chief Complaint: chest pain    Patient is a 78-year-old male with a PMHx of CAD s/p PCI to mLAD in 3/2019, and prostate cancer s/p radical prostatectomy in 10/2021 complicated by small bowel perforation who presented to the ED with intermittent, substernal, non-radiating chest pain over past 2-3 weeks. Symptoms worse after exercising on treadmill, and resolve after few minutes of rest. Did not try NTG. No presyncope, syncope, orthopnea or LE

## 2024-06-30 NOTE — ED NOTES
Floor notified of SBAR  
Report received from Farrukh RN, care assumed at this time.   
  TempSrc:    Oral   SpO2: 100% 100% 100% 99%   Weight:       Height:         PO Status: Regular  O2 Flow Rate:      Cardiac Rhythm: NS  Last documented pain medication administered: Tylenol & asa @ 2043  NIH Score: NIH     Active LDA's:   Peripheral IV 06/29/24 Distal;Right;Ventral Cephalic (Active)   Site Assessment Clean, dry & intact 06/29/24 2029       Pertinent or High Risk Medications/Drips: no   If Yes, please provide details: none  Blood Product Administration: no  If Yes, please provide details: none    Recommendation    Incomplete orders: Admission orders   Additional Comments: none   If any further questions, please call Sending RN at [phone]    Electronically signed by: Electronically signed by Carolyn Laboy RN on 6/29/2024 at 10:05 PM

## 2024-06-30 NOTE — PROGRESS NOTES
4 Eyes Skin Assessment     NAME:  Luis Eden  YOB: 1945  MEDICAL RECORD NUMBER:  [id number]    The patient is being assess for  Admission    I agree that 2 RN's have performed a thorough Head to Toe Skin Assessment on the patient. ALL assessment sites listed below have been assessed.      Areas assessed by both nurses:    Head, Face, Ears, Shoulders, Back, Chest, Arms, Elbows, Hands, Sacrum. Buttock, Coccyx, Ischium, Legs. Feet and Heels, and Under Medical Devices         Does the Patient have a Wound? No noted wound(s)       Giorgio Prevention initiated:  NA   Wound Care Orders initiated:  NA    Pressure Injury (Stage 3,4, Unstageable, DTI, NWPT, and Complex wounds) if present place consult order under :: NA    New and Established Ostomies if present place consult order under : NA      Nurse 1 eSignature: Electronically signed by Danna Jackson LPN on 6/29/24 at 11:15 PM EDT    **SHARE this note so that the co-signing nurse is able to place an eSignature**    Nurse 2 eSignature: Electronically signed by Awais Deleon RN on 6/29/24 at 11:16 PM EDT

## 2024-06-30 NOTE — PROGRESS NOTES
V2.0  [facility] Hospitalist Progress Note      Name:  Luis Eden /Age/Sex: 1945  (78 y.o. male)   MRN & CSN:  [id number] & [id number] Encounter Date/Time: 2024 11:41 AM EDT    Location:  [address] [place] [address]- PCP: Benja Leblanc MD       Hospital Day: 2    Assessment and Plan:   Luis Eden is a 78 y.o. male with pmh of  who presents with Chest pain due to CAD ([facility])      Plan:    # Chest pain due to CAD  # CAD s/p PCI to mLAD in 3/2019  - Endorsed intermittent, substernal, non-radiating chest pain over past 2-3 weeks. Symptoms worse after exercising on treadmill, and resolve after few minutes of rest. Did not try NTG. No presyncope, syncope, orthopnea or LE edema.   - Initial Tn negative. ECG without acute ischemic changes (old changes).   - Currently asymptomatic. Continue ASA, Crestor and Coreg. Cardiology consulted, appreciate assistance. NTG and repeat ECG prn.  Troponin WNL.     # Hx of prostate cancer s/p radical prostatectomy in 10/2021 complicated by small bowel perforation    Diet ADULT DIET; Regular   DVT Prophylaxis [] Lovenox, []  Heparin, [] SCDs, [] Ambulation,  [] Eliquis, [] Xarelto  [] Coumadin   Code Status Full Code   Disposition From:   Expected Disposition:   Estimated Date of Discharge:   Patient requires continued admission due to chest pain,   Surrogate Decision Maker/ POA      Personally reviewed Lab Studies and Imaging         Subjective:     Chief Complaint: Chest Pain (For several weeks and has had a negative stress test- has a heart history. )       Luis Eden is a 78 y.o. male who presents with chest pain.  Patient has been having dull aching left-sided chest pain, mainly with exertion.  It does not radiate anywhere.  He is currently chest pain-free.  States he had multiple normal stress test last few years and is requesting for C.         Review of Systems:    Review of Systems    Negative if not mentioned above    Objective:     Intake/Output Summary (Last 24 hours) at

## 2024-07-01 ENCOUNTER — APPOINTMENT (OUTPATIENT)
Dept: NON INVASIVE DIAGNOSTICS | Age: 79
DRG: 287 | End: 2024-07-01
Attending: INTERNAL MEDICINE
Payer: MEDICARE

## 2024-07-01 LAB — ECHO BSA: 2.2 M2

## 2024-07-01 PROCEDURE — 6360000002 HC RX W HCPCS: Performed by: INTERNAL MEDICINE

## 2024-07-01 PROCEDURE — C1769 GUIDE WIRE: HCPCS | Performed by: INTERNAL MEDICINE

## 2024-07-01 PROCEDURE — 2580000003 HC RX 258: Performed by: INTERNAL MEDICINE

## 2024-07-01 PROCEDURE — 6360000004 HC RX CONTRAST MEDICATION: Performed by: INTERNAL MEDICINE

## 2024-07-01 PROCEDURE — 6370000000 HC RX 637 (ALT 250 FOR IP): Performed by: INTERNAL MEDICINE

## 2024-07-01 PROCEDURE — 6370000000 HC RX 637 (ALT 250 FOR IP): Performed by: STUDENT IN AN ORGANIZED HEALTH CARE EDUCATION/TRAINING PROGRAM

## 2024-07-01 PROCEDURE — 99233 SBSQ HOSP IP/OBS HIGH 50: CPT | Performed by: INTERNAL MEDICINE

## 2024-07-01 PROCEDURE — 2709999900 HC NON-CHARGEABLE SUPPLY: Performed by: INTERNAL MEDICINE

## 2024-07-01 PROCEDURE — C1894 INTRO/SHEATH, NON-LASER: HCPCS | Performed by: INTERNAL MEDICINE

## 2024-07-01 PROCEDURE — 2500000003 HC RX 250 WO HCPCS: Performed by: INTERNAL MEDICINE

## 2024-07-01 PROCEDURE — 2140000000 HC CCU INTERMEDIATE R&B

## 2024-07-01 PROCEDURE — APPNB15 APP NON BILLABLE TIME 0-15 MINS

## 2024-07-01 PROCEDURE — 2580000003 HC RX 258: Performed by: STUDENT IN AN ORGANIZED HEALTH CARE EDUCATION/TRAINING PROGRAM

## 2024-07-01 PROCEDURE — 93458 L HRT ARTERY/VENTRICLE ANGIO: CPT | Performed by: INTERNAL MEDICINE

## 2024-07-01 PROCEDURE — 4A023N7 MEASUREMENT OF CARDIAC SAMPLING AND PRESSURE, LEFT HEART, PERCUTANEOUS APPROACH: ICD-10-PCS | Performed by: INTERNAL MEDICINE

## 2024-07-01 PROCEDURE — B2151ZZ FLUOROSCOPY OF LEFT HEART USING LOW OSMOLAR CONTRAST: ICD-10-PCS | Performed by: INTERNAL MEDICINE

## 2024-07-01 PROCEDURE — 94761 N-INVAS EAR/PLS OXIMETRY MLT: CPT

## 2024-07-01 PROCEDURE — B2111ZZ FLUOROSCOPY OF MULTIPLE CORONARY ARTERIES USING LOW OSMOLAR CONTRAST: ICD-10-PCS | Performed by: INTERNAL MEDICINE

## 2024-07-01 RX ORDER — 0.9 % SODIUM CHLORIDE 0.9 %
INTRAVENOUS SOLUTION INTRAVENOUS CONTINUOUS PRN
Status: COMPLETED | OUTPATIENT
Start: 2024-07-01 | End: 2024-07-01

## 2024-07-01 RX ORDER — MIDAZOLAM HYDROCHLORIDE 1 MG/ML
INJECTION INTRAMUSCULAR; INTRAVENOUS PRN
Status: DISCONTINUED | OUTPATIENT
Start: 2024-07-01 | End: 2024-07-01 | Stop reason: HOSPADM

## 2024-07-01 RX ORDER — 0.9 % SODIUM CHLORIDE 0.9 %
500 INTRAVENOUS SOLUTION INTRAVENOUS ONCE
Status: DISCONTINUED | OUTPATIENT
Start: 2024-07-01 | End: 2024-07-02 | Stop reason: HOSPADM

## 2024-07-01 RX ORDER — HEPARIN SODIUM 200 [USP'U]/100ML
INJECTION, SOLUTION INTRAVENOUS PRN
Status: DISCONTINUED | OUTPATIENT
Start: 2024-07-01 | End: 2024-07-01 | Stop reason: HOSPADM

## 2024-07-01 RX ORDER — SODIUM CHLORIDE 9 MG/ML
INJECTION, SOLUTION INTRAVENOUS CONTINUOUS PRN
Status: COMPLETED | OUTPATIENT
Start: 2024-07-01 | End: 2024-07-01

## 2024-07-01 RX ORDER — SODIUM CHLORIDE 0.9 % (FLUSH) 0.9 %
5-40 SYRINGE (ML) INJECTION EVERY 12 HOURS SCHEDULED
Status: DISCONTINUED | OUTPATIENT
Start: 2024-07-01 | End: 2024-07-02 | Stop reason: HOSPADM

## 2024-07-01 RX ORDER — FENTANYL CITRATE 50 UG/ML
INJECTION, SOLUTION INTRAMUSCULAR; INTRAVENOUS PRN
Status: DISCONTINUED | OUTPATIENT
Start: 2024-07-01 | End: 2024-07-01 | Stop reason: HOSPADM

## 2024-07-01 RX ORDER — ENOXAPARIN SODIUM 100 MG/ML
40 INJECTION SUBCUTANEOUS EVERY EVENING
Status: DISCONTINUED | OUTPATIENT
Start: 2024-07-01 | End: 2024-07-01 | Stop reason: ALTCHOICE

## 2024-07-01 RX ORDER — SODIUM CHLORIDE 0.9 % (FLUSH) 0.9 %
5-40 SYRINGE (ML) INJECTION PRN
Status: DISCONTINUED | OUTPATIENT
Start: 2024-07-01 | End: 2024-07-02 | Stop reason: HOSPADM

## 2024-07-01 RX ORDER — ACETAMINOPHEN 325 MG/1
650 TABLET ORAL EVERY 4 HOURS PRN
Status: DISCONTINUED | OUTPATIENT
Start: 2024-07-01 | End: 2024-07-02 | Stop reason: HOSPADM

## 2024-07-01 RX ORDER — SODIUM CHLORIDE 9 MG/ML
INJECTION, SOLUTION INTRAVENOUS PRN
Status: DISCONTINUED | OUTPATIENT
Start: 2024-07-01 | End: 2024-07-02 | Stop reason: HOSPADM

## 2024-07-01 RX ADMIN — DOCUSATE SODIUM 100 MG: 100 CAPSULE, LIQUID FILLED ORAL at 12:10

## 2024-07-01 RX ADMIN — ISOSORBIDE MONONITRATE 30 MG: 30 TABLET, EXTENDED RELEASE ORAL at 12:10

## 2024-07-01 RX ADMIN — ROSUVASTATIN CALCIUM 20 MG: 20 TABLET, COATED ORAL at 20:54

## 2024-07-01 RX ADMIN — SODIUM CHLORIDE, PRESERVATIVE FREE 10 ML: 5 INJECTION INTRAVENOUS at 20:54

## 2024-07-01 RX ADMIN — APIXABAN 5 MG: 5 TABLET, FILM COATED ORAL at 20:55

## 2024-07-01 RX ADMIN — CARVEDILOL 3.12 MG: 6.25 TABLET, FILM COATED ORAL at 20:54

## 2024-07-01 RX ADMIN — SODIUM CHLORIDE, PRESERVATIVE FREE 10 ML: 5 INJECTION INTRAVENOUS at 12:11

## 2024-07-01 RX ADMIN — DOCUSATE SODIUM 100 MG: 100 CAPSULE, LIQUID FILLED ORAL at 20:54

## 2024-07-01 RX ADMIN — CARVEDILOL 3.12 MG: 6.25 TABLET, FILM COATED ORAL at 12:10

## 2024-07-01 NOTE — PROGRESS NOTES
V2.0  [facility] [place] [facility] Hospitalist Progress Note      Name:  Luis Eden /Age/Sex: 1945  (78 y.o. male)   MRN & CSN:  [id number] & [id number] Encounter Date/Time: 2024 3:13 PM EDT    Location:  [address] [place] [address]-A PCP: Benja Leblanc MD       Hospital Day: 3    Assessment and Plan:   Luis Eden is a 78 y.o. male with pmh of prostate cancer s/p radical prostatectomy complicated by small bowel perforation, possible history of atrial fibrillation, cad s/p pci who presents with Chest pain due to CAD (HCC)     Plan:  # Chest pain due to CAD  # CAD s/p PCI to mLAD in 3/2019  - reported mild chest uncomfortable this morning.   - Initial Tn negative. ECG without acute ischemic changes (old changes).   - Currently asymptomatic. Continue ASA, Crestor and Coreg.  -appreciate cardiology input; cardiac cath this afternoon    # possible history of a-fib  -not on ac  -cardiology will request past medical history from [facility]  -pt may need restart AC     # Hx of prostate cancer s/p radical prostatectomy in 10/2021 complicated by small bowel perforation    Diet Diet NPO Exceptions are: Ice Chips, Sips of Water with Meds   DVT Prophylaxis [x] Lovenox, []  Heparin, [] SCDs, [] Ambulation,  [] Eliquis, [] Xarelto  [] Coumadin   Code Status Full Code   Disposition From: home  Expected Disposition: home  Estimated Date of Discharge: 1-2 days  Patient requires continued admission due to medical condition   Surrogate Decision Maker/ POA      Subjective:     Chief Complaint: Chest Pain (For several weeks and has had a negative stress test- has a heart history. )       Luis Eden is a 78 y.o. male is evaluated in the room. Pt reported mild chest uncomfortable overnight.          Review of Systems:    Review of Systems   All other systems reviewed and are negative.          Objective:     Intake/Output Summary (Last 24 hours) at 2024 1513  Last data filed at 2024 1211  Gross per 24 hour   Intake 20 ml   Output --

## 2024-07-01 NOTE — PROGRESS NOTES
Cardiology Progress Note     Admit Date:  6/29/2024    Consult reason/ Seen today for :       Subjective and  Overnight Events : He says he had some chest discomfort this morning and last night  Plan for cardiac cath today serial troponins are negative    Chief complain on admission : 78 y.o.year old who is admitted for  Chief Complaint   Patient presents with    Chest Pain     For several weeks and has had a negative stress test- has a heart history.       Assessment / Plan:  Extensive previous records reviewed I do not have documentation of atrial fibrillation he will follow-up with [facility] if he does have A-fib he should ideally be on Eliquis  Start Imdur 30 mg daily  His chest pain is significant possible stable ischemic heart disease with exertional angina class II-III nevertheless he is stop Ranexa?  He is on a small dose of carvedilol not keen on taking more meds we will plan cardiac cath to define coronary anatomy had extensive discussion with him about medical management for stable angina rather than stents  Continue Plavix  Get echo     Get records from [facility] about potentially having A-fib?  DVT prophylaxis if no contraindication  6.   Dyslipidemia: continue statins   Discussed with primary team, hospitalist service, bedside nursing staff and family  Past medical history:    has a past medical history of Acid reflux, CAD (coronary artery disease), Diverticulitis, Hx of blood clots, Hyperlipidemia, Hypertension, Parathyroid adenoma, Prostate Cancer, Teeth missing, Wears glasses, and Wears partial dentures.  Past surgical history:   has a past surgical history that includes Vasectomy (1986); Prostate biopsy (2016, 2017); Dental surgery; Cardiac catheterization (2015); Colonoscopy (Last Done In 2000's); Hemorrhoid surgery (1958); parathyroidectomy (Right, 09/04/2018); Coronary stent placement (2018);

## 2024-07-01 NOTE — PROGRESS NOTES
Cardiology Progress Note    Subjective/Overnight Events:  Patient stated that he did have some mild chest discomfort this morning.  Lasted less than 1 minute.    Outside of that 1 incident patient has been chest pain-free.  Feels comfortable at this time.    Spoke with him about using nitroglycerin if chest discomfort is present.    Patient is prepared for his heart catheterization today    Assessment/Plan:  Chest pain with history of coronary artery disease s/p PCI LAD  Sinus bradycardia  -EKG nonischemic.  Troponin negative  -Heart rate predominantly around 60 but does dip into the low 50s.  Asymptomatic for bradycardia  -Obtain echocardiogram.  -Perform heart catheterization later today.  -Continue aspirin, Coreg 3.125 mg twice daily, Imdur 30 mg daily and Crestor 20 mg daily  Hypertension  -Blood pressure stable at this time.  -Continue above  Paroxysmal atrial fibrillation  -Currently Sinus Rhythm  -[id number]: 4  -Discussed care with PA of [facility].  Did confirm that he experienced an episode of short paroxysmal atrial fibrillation in March on an event monitor.  -Patient does need to be on oral anticoagulation.  Will recommend to restart following heart catheterization  History of prostate cancer          Todd Perez PA-C 07/01/24 10:38 AM

## 2024-07-01 NOTE — PLAN OF CARE
Problem: Discharge Planning  Goal: Discharge to home or other facility with appropriate resources  6/30/2024 2211 by Danna Jackson LPN  Outcome: Progressing  6/30/2024 1018 by Christopher Reno LPN  Outcome: Progressing     Problem: Pain  Goal: Verbalizes/displays adequate comfort level or baseline comfort level  6/30/2024 2211 by Danna Jackson LPN  Outcome: Progressing  6/30/2024 1018 by Christopher Reon LPN  Outcome: Progressing     Problem: ABCDS Injury Assessment  Goal: Absence of physical injury  6/30/2024 2211 by Danna Jackson LPN  Outcome: Progressing  6/30/2024 1018 by Christopher Reno LPN  Outcome: Progressing     Problem: Safety - Adult  Goal: Free from fall injury  6/30/2024 2211 by Danna Jackson LPN  Outcome: Progressing  6/30/2024 1018 by Christopher Reno LPN  Outcome: Progressing

## 2024-07-01 NOTE — PROGRESS NOTES
4 Eyes Skin Assessment     NAME:  Luis Eden  YOB: 1945  MEDICAL RECORD NUMBER:  [id number]    The patient is being assessed for  Transfer to New Unit    I agree that at least one RN has performed a thorough Head to Toe Skin Assessment on the patient. ALL assessment sites listed below have been assessed.      Areas assessed by both nurses:    Head, Face, Ears, Shoulders, Back, Chest, Arms, Elbows, Hands, Sacrum. Buttock, Coccyx, Ischium, Legs. Feet and Heels, and Under Medical Devices         Does the Patient have a Wound? No noted wound(s)       Giorgio Prevention initiated by RN: No  Wound Care Orders initiated by RN: No    Pressure Injury (Stage 3,4, Unstageable, DTI, NWPT, and Complex wounds) if present, place Wound referral order by RN under : No    New Ostomies, if present place, Ostomy referral order under : No     Nurse 1 eSignature: Electronically signed by Chiquita Pitts RN on 7/1/24 at 4:02 PM EDT    **SHARE this note so that the co-signing nurse can place an eSignature**    Nurse 2 eSignature: Electronically signed by Karena Gerardo RN on 7/1/24 at 4:03 PM EDT

## 2024-07-01 NOTE — PROGRESS NOTES
Pt is ready for transport.  Pt has voided, has nothing under the gown and in bed.  Wife at bedside and given pt belongings.

## 2024-07-02 ENCOUNTER — APPOINTMENT (OUTPATIENT)
Dept: NON INVASIVE DIAGNOSTICS | Age: 79
DRG: 287 | End: 2024-07-02
Attending: INTERNAL MEDICINE
Payer: MEDICARE

## 2024-07-02 VITALS
BODY MASS INDEX: 26.9 KG/M2 | HEIGHT: 73 IN | TEMPERATURE: 97.8 F | HEART RATE: 79 BPM | WEIGHT: 203 LBS | SYSTOLIC BLOOD PRESSURE: 108 MMHG | DIASTOLIC BLOOD PRESSURE: 62 MMHG | RESPIRATION RATE: 17 BRPM | OXYGEN SATURATION: 97 %

## 2024-07-02 LAB
ECHO AO ROOT DIAM: 3.4 CM
ECHO AO ROOT INDEX: 1.57 CM/M2
ECHO AV AREA PEAK VELOCITY: 2.7 CM2
ECHO AV AREA VTI: 2.6 CM2
ECHO AV AREA/BSA PEAK VELOCITY: 1.2 CM2/M2
ECHO AV AREA/BSA VTI: 1.2 CM2/M2
ECHO AV MEAN GRADIENT: 4 MMHG
ECHO AV MEAN VELOCITY: 0.9 M/S
ECHO AV PEAK GRADIENT: 8 MMHG
ECHO AV PEAK VELOCITY: 1.4 M/S
ECHO AV VELOCITY RATIO: 0.79
ECHO AV VTI: 28.1 CM
ECHO BSA: 2.18 M2
ECHO EST RA PRESSURE: 3 MMHG
ECHO LA AREA 4C: 22.9 CM2
ECHO LA MAJOR AXIS: 6.2 CM
ECHO LA VOL MOD A4C: 69 ML (ref 18–58)
ECHO LA VOLUME INDEX MOD A4C: 32 ML/M2 (ref 16–34)
ECHO LV E' LATERAL VELOCITY: 9 CM/S
ECHO LV E' SEPTAL VELOCITY: 6 CM/S
ECHO LV FRACTIONAL SHORTENING: 34 % (ref 28–44)
ECHO LV INTERNAL DIMENSION DIASTOLE INDEX: 2.03 CM/M2
ECHO LV INTERNAL DIMENSION DIASTOLIC: 4.4 CM (ref 4.2–5.9)
ECHO LV INTERNAL DIMENSION SYSTOLIC INDEX: 1.34 CM/M2
ECHO LV INTERNAL DIMENSION SYSTOLIC: 2.9 CM
ECHO LV IVSD: 1.2 CM (ref 0.6–1)
ECHO LV MASS 2D: 180 G (ref 88–224)
ECHO LV MASS INDEX 2D: 82.9 G/M2 (ref 49–115)
ECHO LV POSTERIOR WALL DIASTOLIC: 1.1 CM (ref 0.6–1)
ECHO LV RELATIVE WALL THICKNESS RATIO: 0.5
ECHO LVOT AREA: 3.5 CM2
ECHO LVOT AV VTI INDEX: 0.74
ECHO LVOT DIAM: 2.1 CM
ECHO LVOT MEAN GRADIENT: 2 MMHG
ECHO LVOT PEAK GRADIENT: 4 MMHG
ECHO LVOT PEAK VELOCITY: 1.1 M/S
ECHO LVOT STROKE VOLUME INDEX: 33 ML/M2
ECHO LVOT SV: 71.7 ML
ECHO LVOT VTI: 20.7 CM
ECHO MV A VELOCITY: 0.79 M/S
ECHO MV E DECELERATION TIME (DT): 236 MS
ECHO MV E VELOCITY: 0.62 M/S
ECHO MV E/A RATIO: 0.78
ECHO MV E/E' LATERAL: 6.89
ECHO MV E/E' RATIO (AVERAGED): 8.61
ECHO MV E/E' SEPTAL: 10.33
ECHO RIGHT VENTRICULAR SYSTOLIC PRESSURE (RVSP): 27 MMHG
ECHO TV REGURGITANT MAX VELOCITY: 2.43 M/S
ECHO TV REGURGITANT PEAK GRADIENT: 24 MMHG

## 2024-07-02 PROCEDURE — 6370000000 HC RX 637 (ALT 250 FOR IP): Performed by: INTERNAL MEDICINE

## 2024-07-02 PROCEDURE — 93306 TTE W/DOPPLER COMPLETE: CPT | Performed by: INTERNAL MEDICINE

## 2024-07-02 PROCEDURE — 94761 N-INVAS EAR/PLS OXIMETRY MLT: CPT

## 2024-07-02 PROCEDURE — 93306 TTE W/DOPPLER COMPLETE: CPT

## 2024-07-02 PROCEDURE — 2580000003 HC RX 258: Performed by: INTERNAL MEDICINE

## 2024-07-02 PROCEDURE — APPNB15 APP NON BILLABLE TIME 0-15 MINS

## 2024-07-02 RX ADMIN — DOCUSATE SODIUM 100 MG: 100 CAPSULE, LIQUID FILLED ORAL at 08:28

## 2024-07-02 RX ADMIN — ISOSORBIDE MONONITRATE 30 MG: 30 TABLET, EXTENDED RELEASE ORAL at 08:28

## 2024-07-02 RX ADMIN — APIXABAN 5 MG: 5 TABLET, FILM COATED ORAL at 08:28

## 2024-07-02 RX ADMIN — ASPIRIN 81 MG: 81 TABLET, CHEWABLE ORAL at 08:28

## 2024-07-02 RX ADMIN — SODIUM CHLORIDE, PRESERVATIVE FREE 10 ML: 5 INJECTION INTRAVENOUS at 08:28

## 2024-07-02 RX ADMIN — CARVEDILOL 3.12 MG: 6.25 TABLET, FILM COATED ORAL at 08:28

## 2024-07-02 NOTE — PROGRESS NOTES
This nurse went over discharge instructions and removed 2-PIV with no complications. Patient discharged home.

## 2024-07-02 NOTE — CARE COORDINATION
07/02/24 0854   Service Assessment   Patient Orientation Alert and Oriented   Cognition Alert   History Provided By Medical Record   Primary Caregiver Self   Support Systems Spouse/Significant Other   Patient's Healthcare Decision Maker is: Legal Next of Kin   PCP Verified by CM Yes   Prior Functional Level Assistance with the following:;Mobility   Current Functional Level Assistance with the following:;Mobility   Can patient return to prior living arrangement Unknown at present   Ability to make needs known: Good   Family able to assist with home care needs: Yes   Would you like for me to discuss the discharge plan with any other family members/significant others, and if so, who? No   Financial Resources Medicare   Community Resources None     Chart reviewed, screened for discharge planning.  Pt from home. No discharge needs identified at this time.  CM following

## 2024-07-02 NOTE — PROGRESS NOTES
Cardiology Progress Note    Subjective/Overnight Events:  Patient feeling well overall today without any active chest pain or shortness of breath.  Informed him of his heart catheterization results.    Heart cath limitations given    Assessment/Plan:  Chest pain with history of coronary artery disease s/p PCI LAD  Sinus bradycardia  -EKG nonischemic.  Troponin negative  -Heart rate predominantly around 60 but does dip into the low 50s.  Asymptomatic for bradycardia  -Echocardiogram pending  -Left heart catheterization yesterday showing stable coronary artery disease, Mild ISR of proximal-mid LAD.  No intervention required  -Continue aspirin, Coreg 3.125 mg twice daily, Imdur 30 mg daily and Crestor 20 mg daily  Hypertension  -Blood pressure stable at this time.  -Continue above  Questionable history of Atrial fibrillation  History of supraventricular tachycardia  -Currently Sinus Rhythm  -[id number]: 4  -Event monitor performed by [facility].  Image shared per below.  More consistent with supraventricular tachycardia  -Can continue with antiplatelet therapy alone.  Recommend close outpatient follow-up with primary cardiologist.  History of prostate cancer            No further inpatient workup planned at this time.  Recommend outpatient follow-up with [facility].    Todd Perez PA-C 07/02/24 9:59 AM

## 2024-07-02 NOTE — DISCHARGE SUMMARY
V2.0  Discharge Summary    Name:  Luis Eden /Age/Sex: 1945 (78 y.o. male)   Admit Date: 2024  Discharge Date: 24    MRN & CSN:  [id number] & [id number] Encounter Date and Time 24 10:30 AM EDT    Attending:  Shiraz Floyd MD Discharging Provider: DAYNA Baird - CNP       Hospital Course:     Brief HPI: Luis Eden is a 78 y.o. male with PMH of prostate cancer s/p radical prostatectomy complicated by small bowel perforation, possible history of atrial fibrillation, cad s/p pci who presents with Chest pain due to CAD (HCC). Pt's EKG has no acute ischemic change. Initial troponin is negative. Cardiology evaluated patient and ordered cardiac cath which was unremarkable. Cardiology recommended medical management with antiplatelet and statin. Pt will follow up with cardiology in clinic.     Brief Problem Based Course:   # Chest pain due to CAD  # CAD s/p PCI to mLAD in 3/2019  - reported mild chest uncomfortable this morning.   - Initial Tn negative. ECG without acute ischemic changes (old changes).   - Currently asymptomatic. Continue ASA, Crestor and Coreg.  -appreciate cardiology input; cardiac cath showed stable CAD, no intervention required. Recommended continue asa, coreg, imdur and crestor. Outpatient follow up with primary cardiologist.      # possible history of a-fib  -cardiology evaluated the event monitor record which consistent with SVT than a-fib.     # Hx of prostate cancer s/p radical prostatectomy in 10/2021 complicated by small bowel perforation         The patient expressed appropriate understanding of, and agreement with the discharge recommendations, medications, and plan.     Consults this admission:  IP CONSULT TO CARDIOLOGY    Discharge Diagnosis:   Chest pain due to CAD (HCC)  History of SVT  History of prostate cancer s/p radical prostatectomy complicated by small bowel perforation    Discharge Instruction:   Follow up appointments: cardiology  Primary care

## 2024-07-02 NOTE — PLAN OF CARE
Problem: Discharge Planning  Goal: Discharge to home or other facility with appropriate resources  7/2/2024 1109 by Mindy Stevens RN  Outcome: Completed  7/2/2024 0110 by Armani Hammond RN  Outcome: Progressing     Problem: Pain  Goal: Verbalizes/displays adequate comfort level or baseline comfort level  7/2/2024 1109 by Mindy Stevens RN  Outcome: Completed  7/2/2024 0110 by Armani Hammond RN  Outcome: Progressing     Problem: ABCDS Injury Assessment  Goal: Absence of physical injury  7/2/2024 1109 by Mindy Stevens RN  Outcome: Completed  7/2/2024 0110 by Armani Hammond RN  Outcome: Progressing     Problem: Safety - Adult  Goal: Free from fall injury  7/2/2024 1109 by Mindy Stevens RN  Outcome: Completed  7/2/2024 0110 by Armani Hammond, RN  Outcome: Progressing

## (undated) DEVICE — RADIFOCUS OPTITORQUE ANGIOGRAPHIC CATHETER: Brand: OPTITORQUE

## (undated) DEVICE — SUTURE VCRL SZ 1 L27IN ABSRB VLT L26MM CT-2 1/2 CIR J335H

## (undated) DEVICE — CANNULA SEAL

## (undated) DEVICE — ADHESIVE SKIN CLSR 0.7ML TOP DERMBND ADV

## (undated) DEVICE — Device

## (undated) DEVICE — REDUCER: Brand: ENDOWRIST

## (undated) DEVICE — INTRODUCER SHTH 6FR L11CM 0.038IN STD SIDEPRT EXTN 3 W

## (undated) DEVICE — BLADE CLIPPER GEN PURP NS

## (undated) DEVICE — SUTURE STRATAFIX SYMMETRIC SZ 1 L18IN ABSRB VLT CT1 L36CM SXPP1A404

## (undated) DEVICE — SYRINGE MED 20ML STD CLR PLAS LUERLOCK TIP N CTRL DISP

## (undated) DEVICE — ANGIOGRAPHY KIT CUST MANIFOLD

## (undated) DEVICE — RADIFOCUS GLIDEWIRE: Brand: GLIDEWIRE

## (undated) DEVICE — SUTURE STRATAFIX SPRL SZ 2-0 L9IN ABSRB VLT MH L36MM 1/2 SXPD2B408

## (undated) DEVICE — GLOVE ORANGE PI 7 1/2   MSG9075

## (undated) DEVICE — SUTURE VCRL SZ 0 L27IN ABSRB UD L26MM CT-2 1/2 CIR J270H

## (undated) DEVICE — TUBING FLTR PLUME AWAY EVAC W/ SUCT DEV DISP PUREVIEW

## (undated) DEVICE — GLIDESHEATH SLENDER ACCESS KIT: Brand: GLIDESHEATH SLENDER

## (undated) DEVICE — SUTURE COAT VCRL SZ 4-0 L18IN ABSRB UD L19MM PS-2 1/2 CIR J496G

## (undated) DEVICE — GLOVE ORANGE PI 7   MSG9070

## (undated) DEVICE — GOWN,ECLIPSE,POLYRNF,BRTHSLV,L,30/CS: Brand: MEDLINE

## (undated) DEVICE — PACK SURG LAP CHOLE

## (undated) DEVICE — 3M™ IOBAN™ 2 ANTIMICROBIAL INCISE DRAPE 6650EZ: Brand: IOBAN™ 2

## (undated) DEVICE — SUTURE 1 STRATAFIX SYMMETRIC PDS + 30CM CT-1 SXPP1A435

## (undated) DEVICE — SEAL

## (undated) DEVICE — COLUMN DRAPE

## (undated) DEVICE — GLOVE SURG SZ 6 THK91MIL LTX FREE SYN POLYISOPRENE ANTI

## (undated) DEVICE — ARM DRAPE

## (undated) DEVICE — BAND COMPR L24CM REG CLR PLAS HEMSTAT EXT HK AND LOOP RETEN

## (undated) DEVICE — SUTURE VCRL SZ 4-0 L27IN ABSRB UD L19MM FS-2 3/8 CIR REV J422H

## (undated) DEVICE — TROCAR: Brand: KII FIOS FIRST ENTRY

## (undated) DEVICE — 260 CM J TIP WIRE .035

## (undated) DEVICE — SYRINGE 20ML LL S/C 50

## (undated) DEVICE — TOWEL,OR,DSP,ST,BLUE,STD,6/PK,12PK/CS: Brand: MEDLINE

## (undated) DEVICE — TIP COVER ACCESSORY

## (undated) DEVICE — NEEDLE HYPO 20GA L1.5IN YEL POLYPR HUB S STL REG BVL STR

## (undated) DEVICE — EXCEL 10FT (3.05 M) INSUFFLATION TUBING SET WITH 0.1 MICRON FILTER: Brand: EXCEL

## (undated) DEVICE — GOWN,SIRUS,POLYRNF,BRTHSLV,XLN/XL,20/CS: Brand: MEDLINE